# Patient Record
Sex: FEMALE | Race: WHITE | Employment: OTHER | URBAN - METROPOLITAN AREA
[De-identification: names, ages, dates, MRNs, and addresses within clinical notes are randomized per-mention and may not be internally consistent; named-entity substitution may affect disease eponyms.]

---

## 2021-01-19 ENCOUNTER — APPOINTMENT (OUTPATIENT)
Dept: CT IMAGING | Age: 81
DRG: 552 | End: 2021-01-19
Payer: MEDICARE

## 2021-01-19 ENCOUNTER — HOSPITAL ENCOUNTER (INPATIENT)
Age: 81
LOS: 2 days | Discharge: NURSING, REHAB OR LONG-TERM CARE | DRG: 552 | End: 2021-01-21
Attending: EMERGENCY MEDICINE | Admitting: SURGERY
Payer: MEDICARE

## 2021-01-19 DIAGNOSIS — S12.9XXA CLOSED FRACTURE OF CERVICAL VERTEBRA, UNSPECIFIED CERVICAL VERTEBRAL LEVEL, INITIAL ENCOUNTER (HCC): Primary | ICD-10-CM

## 2021-01-19 PROBLEM — T14.90XA TRAUMA: Status: ACTIVE | Noted: 2021-01-19

## 2021-01-19 LAB
ALBUMIN SERPL-MCNC: 3.7 G/DL (ref 3.5–5.2)
ALP BLD-CCNC: 107 U/L (ref 35–104)
ALT SERPL-CCNC: 8 U/L (ref 0–32)
ANION GAP SERPL CALCULATED.3IONS-SCNC: 11 MMOL/L (ref 7–16)
ANION GAP SERPL CALCULATED.3IONS-SCNC: 8 MMOL/L (ref 7–16)
AST SERPL-CCNC: 17 U/L (ref 0–31)
BASOPHILS ABSOLUTE: 0 E9/L (ref 0–0.2)
BASOPHILS RELATIVE PERCENT: 0 % (ref 0–2)
BILIRUB SERPL-MCNC: 0.5 MG/DL (ref 0–1.2)
BUN BLDV-MCNC: 14 MG/DL (ref 8–23)
BUN BLDV-MCNC: 16 MG/DL (ref 8–23)
CALCIUM SERPL-MCNC: 9.6 MG/DL (ref 8.6–10.2)
CALCIUM SERPL-MCNC: 9.7 MG/DL (ref 8.6–10.2)
CHLORIDE BLD-SCNC: 105 MMOL/L (ref 98–107)
CHLORIDE BLD-SCNC: 105 MMOL/L (ref 98–107)
CO2: 25 MMOL/L (ref 22–29)
CO2: 28 MMOL/L (ref 22–29)
CREAT SERPL-MCNC: 0.9 MG/DL (ref 0.5–1)
CREAT SERPL-MCNC: 1 MG/DL (ref 0.5–1)
EKG ATRIAL RATE: 55 BPM
EKG P AXIS: 67 DEGREES
EKG P-R INTERVAL: 184 MS
EKG Q-T INTERVAL: 434 MS
EKG QRS DURATION: 84 MS
EKG QTC CALCULATION (BAZETT): 415 MS
EKG R AXIS: -11 DEGREES
EKG T AXIS: 27 DEGREES
EKG VENTRICULAR RATE: 55 BPM
EOSINOPHILS ABSOLUTE: 0.19 E9/L (ref 0.05–0.5)
EOSINOPHILS RELATIVE PERCENT: 2.9 % (ref 0–6)
GFR AFRICAN AMERICAN: >60
GFR NON-AFRICAN AMERICAN: 53 ML/MIN/1.73
GFR NON-AFRICAN AMERICAN: 60 ML/MIN/1.73
GFR NON-AFRICAN AMERICAN: >60 ML/MIN/1.73
GLUCOSE BLD-MCNC: 93 MG/DL (ref 74–99)
GLUCOSE BLD-MCNC: 96 MG/DL (ref 74–99)
GLUCOSE BLD-MCNC: 99 MG/DL (ref 74–99)
HCT VFR BLD CALC: 36 % (ref 34–48)
HCT VFR BLD CALC: 36.3 % (ref 34–48)
HEMOGLOBIN: 10.5 G/DL (ref 11.5–15.5)
HEMOGLOBIN: 11.1 G/DL (ref 11.5–15.5)
IMMATURE GRANULOCYTES #: 0.02 E9/L
IMMATURE GRANULOCYTES %: 0.3 % (ref 0–5)
LYMPHOCYTES ABSOLUTE: 1.11 E9/L (ref 1.5–4)
LYMPHOCYTES RELATIVE PERCENT: 16.8 % (ref 20–42)
MCH RBC QN AUTO: 26.8 PG (ref 26–35)
MCH RBC QN AUTO: 27.1 PG (ref 26–35)
MCHC RBC AUTO-ENTMCNC: 29.2 % (ref 32–34.5)
MCHC RBC AUTO-ENTMCNC: 30.6 % (ref 32–34.5)
MCV RBC AUTO: 88.8 FL (ref 80–99.9)
MCV RBC AUTO: 91.8 FL (ref 80–99.9)
MONOCYTES ABSOLUTE: 0.59 E9/L (ref 0.1–0.95)
MONOCYTES RELATIVE PERCENT: 8.9 % (ref 2–12)
NEUTROPHILS ABSOLUTE: 4.71 E9/L (ref 1.8–7.3)
NEUTROPHILS RELATIVE PERCENT: 71.1 % (ref 43–80)
PDW BLD-RTO: 16.1 FL (ref 11.5–15)
PDW BLD-RTO: 16.4 FL (ref 11.5–15)
PERFORMED ON: NORMAL
PLATELET # BLD: 203 E9/L (ref 130–450)
PLATELET # BLD: 218 E9/L (ref 130–450)
PMV BLD AUTO: 9.2 FL (ref 7–12)
PMV BLD AUTO: 9.7 FL (ref 7–12)
POC CHLORIDE: 107 MMOL/L (ref 100–108)
POC CREATININE: 0.8 MG/DL (ref 0.5–1)
POC POTASSIUM: 4 MMOL/L (ref 3.5–5)
POC SODIUM: 145 MMOL/L (ref 132–146)
POTASSIUM REFLEX MAGNESIUM: 4.9 MMOL/L (ref 3.5–5)
POTASSIUM SERPL-SCNC: 4.2 MMOL/L (ref 3.5–5)
RBC # BLD: 3.92 E12/L (ref 3.5–5.5)
RBC # BLD: 4.09 E12/L (ref 3.5–5.5)
SODIUM BLD-SCNC: 138 MMOL/L (ref 132–146)
SODIUM BLD-SCNC: 144 MMOL/L (ref 132–146)
TOTAL PROTEIN: 7.2 G/DL (ref 6.4–8.3)
TROPONIN: <0.01 NG/ML (ref 0–0.03)
WBC # BLD: 6.6 E9/L (ref 4.5–11.5)
WBC # BLD: 7.1 E9/L (ref 4.5–11.5)

## 2021-01-19 PROCEDURE — 84132 ASSAY OF SERUM POTASSIUM: CPT

## 2021-01-19 PROCEDURE — 93005 ELECTROCARDIOGRAM TRACING: CPT | Performed by: STUDENT IN AN ORGANIZED HEALTH CARE EDUCATION/TRAINING PROGRAM

## 2021-01-19 PROCEDURE — 2580000003 HC RX 258: Performed by: STUDENT IN AN ORGANIZED HEALTH CARE EDUCATION/TRAINING PROGRAM

## 2021-01-19 PROCEDURE — 36415 COLL VENOUS BLD VENIPUNCTURE: CPT

## 2021-01-19 PROCEDURE — 6370000000 HC RX 637 (ALT 250 FOR IP): Performed by: STUDENT IN AN ORGANIZED HEALTH CARE EDUCATION/TRAINING PROGRAM

## 2021-01-19 PROCEDURE — 6360000004 HC RX CONTRAST MEDICATION: Performed by: RADIOLOGY

## 2021-01-19 PROCEDURE — 99285 EMERGENCY DEPT VISIT HI MDM: CPT

## 2021-01-19 PROCEDURE — 99223 1ST HOSP IP/OBS HIGH 75: CPT | Performed by: SURGERY

## 2021-01-19 PROCEDURE — 82947 ASSAY GLUCOSE BLOOD QUANT: CPT

## 2021-01-19 PROCEDURE — 85027 COMPLETE CBC AUTOMATED: CPT

## 2021-01-19 PROCEDURE — 1200000000 HC SEMI PRIVATE

## 2021-01-19 PROCEDURE — 80048 BASIC METABOLIC PNL TOTAL CA: CPT

## 2021-01-19 PROCEDURE — 85025 COMPLETE CBC W/AUTO DIFF WBC: CPT

## 2021-01-19 PROCEDURE — 82565 ASSAY OF CREATININE: CPT

## 2021-01-19 PROCEDURE — 84484 ASSAY OF TROPONIN QUANT: CPT

## 2021-01-19 PROCEDURE — 84295 ASSAY OF SERUM SODIUM: CPT

## 2021-01-19 PROCEDURE — 82435 ASSAY OF BLOOD CHLORIDE: CPT

## 2021-01-19 PROCEDURE — 80053 COMPREHEN METABOLIC PANEL: CPT

## 2021-01-19 PROCEDURE — 70498 CT ANGIOGRAPHY NECK: CPT

## 2021-01-19 PROCEDURE — 6360000002 HC RX W HCPCS: Performed by: STUDENT IN AN ORGANIZED HEALTH CARE EDUCATION/TRAINING PROGRAM

## 2021-01-19 PROCEDURE — 93010 ELECTROCARDIOGRAM REPORT: CPT | Performed by: INTERNAL MEDICINE

## 2021-01-19 RX ORDER — FOLIC ACID 1 MG/1
1 TABLET ORAL DAILY
Status: DISCONTINUED | OUTPATIENT
Start: 2021-01-19 | End: 2021-01-21 | Stop reason: HOSPADM

## 2021-01-19 RX ORDER — METHOCARBAMOL 500 MG/1
1000 TABLET, FILM COATED ORAL 4 TIMES DAILY
Status: DISCONTINUED | OUTPATIENT
Start: 2021-01-19 | End: 2021-01-19

## 2021-01-19 RX ORDER — GABAPENTIN 100 MG/1
100 CAPSULE ORAL 3 TIMES DAILY
Status: DISCONTINUED | OUTPATIENT
Start: 2021-01-19 | End: 2021-01-21 | Stop reason: HOSPADM

## 2021-01-19 RX ORDER — ZOLPIDEM TARTRATE 10 MG/1
TABLET ORAL NIGHTLY PRN
Status: ON HOLD | COMMUNITY
End: 2021-01-21 | Stop reason: HOSPADM

## 2021-01-19 RX ORDER — ATORVASTATIN CALCIUM 40 MG/1
40 TABLET, FILM COATED ORAL NIGHTLY
Status: DISCONTINUED | OUTPATIENT
Start: 2021-01-19 | End: 2021-01-21 | Stop reason: HOSPADM

## 2021-01-19 RX ORDER — SODIUM CHLORIDE 9 MG/ML
INJECTION, SOLUTION INTRAVENOUS CONTINUOUS
Status: DISCONTINUED | OUTPATIENT
Start: 2021-01-19 | End: 2021-01-19

## 2021-01-19 RX ORDER — ACETAMINOPHEN 325 MG/1
650 TABLET ORAL
Status: DISCONTINUED | OUTPATIENT
Start: 2021-01-19 | End: 2021-01-19

## 2021-01-19 RX ORDER — LANSOPRAZOLE
15 KIT
Status: DISCONTINUED | OUTPATIENT
Start: 2021-01-19 | End: 2021-01-21 | Stop reason: HOSPADM

## 2021-01-19 RX ORDER — MEMANTINE HYDROCHLORIDE 10 MG/1
10 TABLET ORAL 2 TIMES DAILY
COMMUNITY

## 2021-01-19 RX ORDER — MEMANTINE HYDROCHLORIDE 10 MG/1
10 TABLET ORAL DAILY
Status: DISCONTINUED | OUTPATIENT
Start: 2021-01-19 | End: 2021-01-21 | Stop reason: HOSPADM

## 2021-01-19 RX ORDER — OLANZAPINE 5 MG/1
2.5 TABLET ORAL NIGHTLY
Status: DISCONTINUED | OUTPATIENT
Start: 2021-01-19 | End: 2021-01-21 | Stop reason: HOSPADM

## 2021-01-19 RX ORDER — ONDANSETRON 2 MG/ML
4 INJECTION INTRAMUSCULAR; INTRAVENOUS EVERY 6 HOURS PRN
Status: DISCONTINUED | OUTPATIENT
Start: 2021-01-19 | End: 2021-01-21 | Stop reason: HOSPADM

## 2021-01-19 RX ORDER — ATORVASTATIN CALCIUM 40 MG/1
40 TABLET, FILM COATED ORAL NIGHTLY
COMMUNITY

## 2021-01-19 RX ORDER — RIZATRIPTAN BENZOATE 10 MG/1
10 TABLET ORAL
Status: ON HOLD | COMMUNITY
End: 2021-01-21 | Stop reason: HOSPADM

## 2021-01-19 RX ORDER — FOLIC ACID 1 MG/1
1 TABLET ORAL DAILY
COMMUNITY

## 2021-01-19 RX ORDER — PROMETHAZINE HYDROCHLORIDE 25 MG/1
12.5 TABLET ORAL EVERY 6 HOURS PRN
Status: DISCONTINUED | OUTPATIENT
Start: 2021-01-19 | End: 2021-01-19

## 2021-01-19 RX ORDER — OXYBUTYNIN CHLORIDE 5 MG/1
5 TABLET ORAL 3 TIMES DAILY
COMMUNITY

## 2021-01-19 RX ORDER — OXYBUTYNIN CHLORIDE 5 MG/1
5 TABLET ORAL 3 TIMES DAILY
Status: DISCONTINUED | OUTPATIENT
Start: 2021-01-19 | End: 2021-01-21 | Stop reason: HOSPADM

## 2021-01-19 RX ORDER — FLUOXETINE HYDROCHLORIDE 20 MG/1
40 CAPSULE ORAL DAILY
COMMUNITY

## 2021-01-19 RX ORDER — HYDROCODONE BITARTRATE AND ACETAMINOPHEN 5; 325 MG/1; MG/1
1 TABLET ORAL EVERY 6 HOURS PRN
Status: ON HOLD | COMMUNITY
End: 2021-01-21 | Stop reason: HOSPADM

## 2021-01-19 RX ORDER — OLANZAPINE 5 MG/1
5 TABLET ORAL DAILY
Status: ON HOLD | COMMUNITY
End: 2021-01-21 | Stop reason: HOSPADM

## 2021-01-19 RX ORDER — CHOLECALCIFEROL (VITAMIN D3) 1250 MCG
CAPSULE ORAL
COMMUNITY

## 2021-01-19 RX ORDER — SODIUM CHLORIDE 0.9 % (FLUSH) 0.9 %
10 SYRINGE (ML) INJECTION EVERY 12 HOURS SCHEDULED
Status: DISCONTINUED | OUTPATIENT
Start: 2021-01-19 | End: 2021-01-21 | Stop reason: HOSPADM

## 2021-01-19 RX ORDER — SODIUM CHLORIDE 0.9 % (FLUSH) 0.9 %
10 SYRINGE (ML) INJECTION PRN
Status: DISCONTINUED | OUTPATIENT
Start: 2021-01-19 | End: 2021-01-21 | Stop reason: HOSPADM

## 2021-01-19 RX ORDER — PANTOPRAZOLE SODIUM 40 MG/1
40 TABLET, DELAYED RELEASE ORAL DAILY
COMMUNITY

## 2021-01-19 RX ORDER — FLUOXETINE HYDROCHLORIDE 20 MG/1
40 CAPSULE ORAL DAILY
Status: DISCONTINUED | OUTPATIENT
Start: 2021-01-19 | End: 2021-01-21 | Stop reason: HOSPADM

## 2021-01-19 RX ORDER — MECLIZINE HYDROCHLORIDE 25 MG/1
25 TABLET ORAL 3 TIMES DAILY PRN
COMMUNITY

## 2021-01-19 RX ORDER — HYDROCODONE BITARTRATE AND ACETAMINOPHEN 5; 325 MG/1; MG/1
1 TABLET ORAL EVERY 8 HOURS PRN
Status: DISCONTINUED | OUTPATIENT
Start: 2021-01-19 | End: 2021-01-21

## 2021-01-19 RX ORDER — ACETAMINOPHEN 325 MG/1
650 TABLET ORAL EVERY 6 HOURS PRN
Status: DISCONTINUED | OUTPATIENT
Start: 2021-01-19 | End: 2021-01-19

## 2021-01-19 RX ADMIN — HYDROCODONE BITARTRATE AND ACETAMINOPHEN 1 TABLET: 5; 325 TABLET ORAL at 18:08

## 2021-01-19 RX ADMIN — ATORVASTATIN CALCIUM 40 MG: 40 TABLET, FILM COATED ORAL at 21:26

## 2021-01-19 RX ADMIN — Medication 10 ML: at 21:38

## 2021-01-19 RX ADMIN — ONDANSETRON 4 MG: 2 INJECTION INTRAMUSCULAR; INTRAVENOUS at 18:07

## 2021-01-19 RX ADMIN — IOPAMIDOL 60 ML: 755 INJECTION, SOLUTION INTRAVENOUS at 05:31

## 2021-01-19 RX ADMIN — OXYBUTYNIN CHLORIDE 5 MG: 5 TABLET ORAL at 23:05

## 2021-01-19 RX ADMIN — GABAPENTIN 100 MG: 100 CAPSULE ORAL at 21:26

## 2021-01-19 RX ADMIN — SODIUM CHLORIDE: 9 INJECTION, SOLUTION INTRAVENOUS at 07:01

## 2021-01-19 RX ADMIN — SODIUM CHLORIDE, PRESERVATIVE FREE 10 ML: 5 INJECTION INTRAVENOUS at 21:27

## 2021-01-19 RX ADMIN — OLANZAPINE 2.5 MG: 5 TABLET, FILM COATED ORAL at 21:26

## 2021-01-19 RX ADMIN — ACETAMINOPHEN 650 MG: 325 TABLET ORAL at 06:50

## 2021-01-19 RX ADMIN — HYDROCODONE BITARTRATE AND ACETAMINOPHEN 1 TABLET: 5; 325 TABLET ORAL at 11:55

## 2021-01-19 SDOH — HEALTH STABILITY: MENTAL HEALTH: HOW OFTEN DO YOU HAVE A DRINK CONTAINING ALCOHOL?: NEVER

## 2021-01-19 ASSESSMENT — PAIN DESCRIPTION - PAIN TYPE
TYPE: ACUTE PAIN

## 2021-01-19 ASSESSMENT — PAIN DESCRIPTION - DESCRIPTORS
DESCRIPTORS: DISCOMFORT
DESCRIPTORS: ACHING;DISCOMFORT;DULL

## 2021-01-19 ASSESSMENT — PAIN DESCRIPTION - LOCATION
LOCATION: NECK

## 2021-01-19 ASSESSMENT — PAIN SCALES - GENERAL
PAINLEVEL_OUTOF10: 2
PAINLEVEL_OUTOF10: 6

## 2021-01-19 NOTE — ED NOTES
Bed: 21  Expected date:   Expected time:   Means of arrival:   Comments:  Ems-transfer     Renata Rick RN  01/19/21 9249

## 2021-01-19 NOTE — PROGRESS NOTES
Patient seen and examined  Awake and alert, collar in place neuro intact  Has anterior inferior C4 fracture (tear drop)  Will need collar for 8 weeks  ?  Syncope eval ?  samantha follow

## 2021-01-19 NOTE — H&P
TRAUMA SURGERY HISTORY & PHYSICAL  TRAUMA ATTENDING      Attending Physician Statement:  I have examined the patient in ED, reviewed the record, and discussed the case with the resident/APN. I agree with the  assessment and plan with the following corrections/additions. CC: s/p fall    HISTORY   The patient is a 78 y/o female who sustained a GLF earlier today. The patient reported  acute, constant  sharp pain localized to the cervical spine that started immediately. The intensity of the pain is 4/10. Pain does not radiate. There are no alleviating or worsening factors regarding the pain. The patient was transported by EMS to the [address] [facility] from scene. Evaluation prior to arrival included: H&P. Treatment prior to arrival included: CT head and c-spine. A trauma consult was requested to assist, guide,  and expedite further evaluation and treatment for the patient. Past medical/surgical/family/social history:  as noted in resident/APN note  The family history is noncontributory.     Review of Systems:  General ROS: negative  Psychological ROS: negative  ENT ROS: negative  Hematological and Lymphatic ROS: negative  Respiratory ROS: negative  Cardiovascular ROS: negative  Gastrointestinal ROS: negative  Genito-Urinary ROS: negative  Musculoskeletal ROS: cervical spine pain  Neurological ROS: negative     PHYSICAL EXAM:  Vitals:    01/19/21 1632   BP: (!) 176/70   Pulse: 57   Resp:    Temp:    SpO2:        Neuro:   GCS 14    Moving all extremities   Reactive, equal pupils  Psychiatric:  Affect normal, Judgement impaired d/t dementia   Alert and oriented to self, place  Head/Face:    soft tissue injury--forehead contusion  no bony deformities  Eyes:    Vision/EOM grossly intact  Ears:    no otorrhagia  Nose:     no epistaxis  Mouth:    no intraoral lacerations/ecchymoses  Neck:   semi rigid collar present  no tracheal deviation   no soft tissue injury  Chest: no deformities  non tender  Lungs:     equal and clear bilateral breath sounds  no use of accessory muscles  Heart:    normal sinus rhythm  warm, well perfused  strong femoral pulses bilaterally  Abdomen:    non distended  non tender  no soft tissue injury  Back:    no soft tissue injury  no deformities  nontender   Musculoskeletal:    Gait not inspected due to patient on trauma bed  RUE:  no soft tissue injury, swelling, or deformity  RLE:  no soft tissue injury, swelling, or deformity  LUE:  no soft tissue injury, swelling, or deformity  LLE:  no soft tissue injury, swelling, or deformity        DIAGNOSTIC/ LAB FINDINGS  I  Independently reviewed the available laboratory studies and diagnostic imaging.     ASSESSMENT:  1.  C4 fracture  2.  dementia    PLAN:  Admit to general floor  NSGY c/s  Clarify code status      MD ISHMAEL Malave [facility]  1/19/2021  5:07 PM

## 2021-01-19 NOTE — DISCHARGE SUMMARY
Physician Discharge Summary     Patient ID:  Bishnu Choudhury  [id number]  43 y.o.  1940    Admit date: 1/19/2021    Discharge date and time:1/21/2021    Admitting Physician:  King Bautista MD    Admission Diagnoses: Trauma [T14.90XA]    Discharge Diagnoses: Active Problems:    Trauma  Resolved Problems:    * No resolved hospital problems. *      Admission Condition: serious    Discharged Condition: stable    Indication for Admission: Active Problems:    Trauma  Resolved Problems:    * No resolved hospital problems. [facility] Course/Procedures/Operation/treatments:   1/18-Trauma consult. Injury occurred just prior to arrival to outside hospital.  Patient is an 57-year-old female who did not remember falling earlier today. She stated that she took a shower early got dressed and then does not remember anything until she woke up on the floor. She is a small little contusion on her forehead. Outside hospital showed a C4 inferior vertebral body fracture. Otherwise she is GCS of 14  1/19-No acute events overnight. Pt is doing well in an aspen collar now. GCS 14. Neuromuscular intact. Nothing new on tert   1/20- No acute events overnight. Awaiting custom fit collar. 1/21- doing well. precert pending. COVID test ordered per protocol. GCS 15 this morning. COVID test negative. Ready for discharge to [facility].              Consults:   IP CONSULT TO TRAUMA SURGERY  IP CONSULT TO NEUROSURGERY  IP CONSULT TO PALLIATIVE CARE  IP CONSULT TO CASE MANAGEMENT  INPATIENT CONSULT TO ORTHOTIST/PROSTHETIST    Significant Diagnostic Studies:   Cta Neck W Contrast    Result Date: 1/19/2021 1. No evidence of acute trauma involving the major arterial vessels of the neck. 2. C4 vertebral body anteroinferior mildly displaced oblique acute fracture. 3. Left internal carotid artery proximal medial wall atherosclerotic calcification causing approximately 30% stenosis. Finding is compatible with 16-49% stenosis per sonographic NASCET index criteria. 4. Congenitally hypoplastic left vertebral artery. 5. Incidental finding of multinodular goiter, as discussed above. Recommend clinical correlation. 6. 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 Discharge Exam:  BP (!) 141/71   Pulse 75   Temp 97.8 °F (36.6 °C) (Temporal)   Resp 16   Ht 5' 7\" (1.702 m)   Wt 152 lb (68.9 kg)   SpO2 93%   BMI 23.81 kg/m²   Physical Exam  Constitutional:       Appearance: Normal appearance. HENT:      Head: Normocephalic and atraumatic. Nose: Nose normal.      Mouth/Throat:      Mouth: Mucous membranes are moist.      Pharynx: Oropharynx is clear. Eyes:      Extraocular Movements: Extraocular movements intact. Pupils: Pupils are equal, round, and reactive to light. Neck:      Comments: In cervical collar  Cardiovascular:      Rate and Rhythm: Normal rate and regular rhythm. Pulses: Normal pulses. Heart sounds: Normal heart sounds. Pulmonary:      Effort: Pulmonary effort is normal.      Breath sounds: Normal breath sounds. Abdominal:      General: There is no distension. Palpations: Abdomen is soft. Tenderness: There is no abdominal tenderness. Musculoskeletal:      Right lower leg: No edema. Left lower leg: No edema. Skin:     General: Skin is warm and dry. Neurological:      General: No focal deficit present. Mental Status: She is alert.       Comments: GCS 14--dementia, baseline   Psychiatric:         Mood and Affect: Mood normal.         Behavior: Behavior normal. Thought Content: Thought content normal.         Judgment: Judgment normal.           Disposition: SNF    Patient Instructions:   SPECIAL CONSIDERATIONS FOR OUR PATIENTS OVER THE AGE OF 65Y    Getting around your home safely can be a challenge if you have injuries or health problems that make it easy for you to fall. Loose rugs and furniture in walkways are among the dangers for many older people who have problems walking or who have poor eyesight. People who have conditions such as arthritis, osteoporosis, or dementia also must be careful not to fall. You can make your home safer with a few simple measures. Follow-up care is a key part of your treatment and safety. Be sure to make and go to all appointments, and call your doctor or nurse call line if you are having problems. It's also a good idea to know your test results and keep a list of the medicines you take. How can you care for yourself at home? Taking care of yourself  ? You may get dizzy if you do not drink enough water. To prevent dehydration, drink plenty of fluids, enough so that your urine is light yellow or clear like water. Choose water and other caffeine-free clear liquids. If you have kidney, heart, or liver disease and have to limit fluids, talk with your doctor before you increase the amount of fluids you drink. ? Exercise regularly to improve your strength, muscle tone, and balance. Walk if you can. Swimming may be a good choice if you cannot walk easily. ? Have your vision and hearing checked each year or any time you notice a change. If you have trouble seeing and hearing, you might not be able to avoid objects and could lose your balance. ? Know the side effects of the medicines you take. Ask your doctor or pharmacist whether the medicines you take can affect your balance. Sleeping pills or sedatives can affect your balance. ? Limit the amount of alcohol you drink. Alcohol can impair your balance and other senses. ? Ask your doctor whether calluses or corns on your feet need to be removed. If you wear loose-fitting shoes because of calluses or corns, you can lose your balance and fall. ? Talk to your doctor if you have numbness in your feet. Preventing falls at home  ? Remove raised doorway thresholds, throw rugs, and clutter. Repair loose carpet or raised areas in the floor. ? Move furniture and electrical cords to keep them out of walking paths. ? Use non-skid floor wax, and wipe up spills right away, especially on ceramic tile floors. ? If you use a walker or cane, put rubber tips on it. If you use crutches, clean the bottoms of them regularly with an abrasive pad, such as steel wool. ? Keep your house well lit, especially stairways, porches, and outside walkways. Use night-lights in areas such as hallways and washrooms. Add extra light switches or use remote switches (such as switches that go on or off when you clap your hands) to make it easier to turn lights on if you have to get up during the night. ? Install sturdy handrails on stairways. ? Move items in your cabinets so that the things you use a lot are on the lower shelves (about waist level). ? Keep a cordless phone and a flashlight with new batteries by your bed. If possible, put a phone in each of the main rooms of your house, or carry a cell phone in case you fall and cannot reach a phone. Or, you can wear a device around your neck or wrist. You push a button that sends a signal for help. ? Wear low-heeled shoes that fit well and give your feet good support. Use footwear with non-skid soles. Check the heels and soles of your shoes for wear. Repair or replace worn heels or soles. ? Do not wear socks without shoes on wood floors. ? Walk on the grass when the sidewalks are slippery. If you live in an area that gets snow and ice in the winter, sprinkle salt on slippery steps and sidewalks.     Preventing falls in the bath ? Install grab bars and non-skid mats inside and outside your shower or tub and near the toilet and sinks. ? Use shower chairs and bath benches. ? Use a hand-held shower head that will allow you to sit while showering. ? Get into a tub or shower by putting the weaker leg in first. Get out of a tub or shower with your strong side first.  ? Repair loose toilet seats and consider installing a raised toilet seat to make getting on and off the toilet easier. ? Keep your washroom door unlocked while you are in the shower.     Follow-up:  Trauma Clinic: [phone] option 2  [facility]  ISH[place], [address] [address]       Follow up:   [facility]  [address]  [phone]    In 2 weeks  for cervical fracture    [address] [place] [id number]    As needed       Signed:  VINOD Mcdaniel CNP  1/21/2021  2:50 PM

## 2021-01-19 NOTE — ED NOTES
Pt ripped out IV on way upstairs, tried x3 times unsuccessful for a new line, IV team paged and meeting patient on [id number].       Miles Barney RN  01/19/21 1673

## 2021-01-19 NOTE — H&P
TRAUMA HISTORY & PHYSICAL  Surgical Resident/Advance Practice Nurse  1/19/2021  2:10 AM    PRIMARY SURVEY    CHIEF COMPLAINT:  Trauma consult. Injury occurred just prior to arrival to outside hospital.  Patient is an 57-year-old female who did not remember falling earlier today. She stated that she took a shower early got dressed and then does not remember anything until she woke up on the floor. She is a small little contusion on her forehead. Outside hospital showed a C4 inferior vertebral body fracture.   Otherwise she is GCS of 14    AIRWAY:   Airway Normal  EMS ETT Absent  Noisy respirations Absent  Retractions: Absent  Vomiting/bleeding: Absent      BREATHING:    Midaxillary breath sound left:  Normal  Midaxillary breath sound right:  Normal    Cough sound intensity:  fair   FiO2: Room air  SMI unreliable      CIRCULATION:   Femerol pulse intensity: Strong  Palpebral conjunctiva: white      Vitals:    01/19/21 0120   BP: (!) 168/64   Pulse: 58   Resp: 16   Temp: 97.1 °F (36.2 °C)   SpO2: 96%       Vitals:    01/19/21 0120   BP: (!) 168/64   Pulse: 58   Resp: 16   Temp: 97.1 °F (36.2 °C)   SpO2: 96%   Weight: 152 lb (68.9 kg)   Height: 5' 7\" (1.702 m)        FAST EXAM: None    Central Nervous System    GCS Initial 15 minutes   Eye  Motor  Verbal 4 - Opens eyes on own  6 - Follows simple motor commands  4 - Seems confused, disoriented 4 - Opens eyes on own  6 - Follows simple motor commands  4 - Seems confused, disoriented     Neuromuscular blockade: No  Pupil size:  Left 4 mm    Right 4 mm  Pupil reaction: Yes    Wiggles fingers: Left Yes Right Yes  Wiggles toes: Left Yes   Right Yes    Hand grasp:   Left  Present      Right  Present  Plantar flexion: Left  Present      Right   Present    Loss of consciousness:  No  History Obtained From:  Patient & EMS  Private Medical Doctor: Unknown patient presenting from assisted living    Pre-exisiting Medical History:  yes Conditions: Dementia, GERD, polyneuropathy, RA, macular degeneration    Medications: Unknown    Allergies: Celebrex    Social History:   Tobacco use:  none  Alcohol use:  none  Illicit drug use:  no history of illicit drug use    Past Surgical History: Unknown    Anticoagulant use:  No   Antiplatelet use:    No     NSAID use in last 72 hours: no  Taken PCN in past:  yes  Last food/drink: Yesterday evening  Last tetanus: Yesterday evening    Family History:   Not pertinent to presenting problem. Complaints:   Head:  Mild  Neck:   Moderate  Chest:   None  Back:   None  Abdomen:   None  Extremities:   None  Comments:       Review of systems:  All negative unless otherwise noted. SECONDARY SURVEY  Head/scalp: Small contusion on the right superior aspect of the forehead. Mild overlying ecchymosis    Face: Atraumatic    Eyes/ears/nose: Atraumatic    Pharynx/mouth: Atraumatic    Neck: Atraumatic     Cervical spine tenderness:   Cervical collar in place at time of arrival  Pain:  moderate  ROM:  Not indicated     Chest wall:  Atraumatic    Heart:  Regular rate & rhythm    Abdomen: Atraumatic. Soft ND  Tenderness:  none    Pelvis: Atraumatic  Tenderness: none    Thoracolumbar spine: Atraumatic  Tenderness:  none    Genitourinary:  Atraumatic. No blood or urine noted    Rectum: Atraumatic. No blood noted. Perineum: Atraumatic. No blood or urine noted. Extremities:   Sensory normal  Motor normal    Distal Pulses  Left arm normal  Right arm normal  Left leg normal  Right leg normal    Capillary refill  Left arm normal  Right arm normal  Left leg normal  Right leg normal    Procedures in ED:  None    In the event of Emergency Blood Transfusion:  Due to the critical condition of this patient, I request the immediate release of blood products for emergency transfusion secondary to shock. I understand the increased risks incurred by the lack of complete transfusion testing.

## 2021-01-19 NOTE — ED NOTES
ramses [email or web address] orders/ticket to ride/ facesheet/consult      [facility]  01/19/21 1200

## 2021-01-19 NOTE — ED PROVIDER NOTES
HPI:  1/19/21, Time: 1:21 AM EVON Celis is a 79 y.o. female presenting to the ED for hx of fall with findings of cervical spine fracture, beginning 1 day ago. The complaint has been persistent, moderate in severity, and worsened by movement of neck. Sent here from outlying facility because of cervical spine fracture. Patient reporting no numbness or tingling. Patient reporting no chest pain. Patient reporting no abdominal pain or vomiting there is no lower extremity pain or hip pain. Patient is not on anticoagulants. Patient is unsure as to how she had fallen but per report patient was in a shower and fell in the shower. ROS:   Pertinent positives and negatives are stated within HPI, all other systems reviewed and are negative.  --------------------------------------------- PAST HISTORY ---------------------------------------------  Past Medical History:  has no past medical history on file. Past Surgical History:  has no past surgical history on file. Social History:  reports that she has never smoked. She has never used smokeless tobacco. She reports previous alcohol use. She reports that she does not use drugs. Family History: family history is not on file. The patients home medications have been reviewed. Allergies: Celecoxib    ---------------------------------------------------PHYSICAL EXAM--------------------------------------    Constitutional/General: Alert and oriented x3,   Head: Normocephalic contusion forehead  Eyes: PERRL, EOMI  Mouth: Oropharynx clear, handling secretions, no trismus  Neck: c collar in place  Pulmonary: Lungs clear to auscultation bilaterally, no wheezes, rales, or rhonchi. Not in respiratory distress  Cardiovascular:  Regular rate. Regular rhythm. No murmurs, gallops, or rubs.  2+ distal pulses  Chest: no chest wall tenderness Abdomen: Soft. Non tender. Non distended. +BS. No rebound, guarding, or rigidity. No pulsatile masses appreciated. Musculoskeletal: Moves all extremities x 4. Warm and well perfused, no clubbing, cyanosis, or edema. Capillary refill <3 seconds  Skin: warm and dry. No rashes. Neurologic: GCS 15, CN 2-12 grossly intact, no focal deficits, symmetric strength 5/5 in the upper and lower extremities bilaterally  Psych: Normal Affect    -------------------------------------------------- RESULTS -------------------------------------------------  I have personally reviewed all laboratory and imaging results for this patient. Results are listed below. LABS:  No results found for this visit on 01/19/21. RADIOLOGY:  Interpreted by Radiologist.  No orders to display               ------------------------- NURSING NOTES AND VITALS REVIEWED ---------------------------   The nursing notes within the ED encounter and vital signs as below have been reviewed by myself. BP (!) 168/64   Pulse 58   Temp 97.1 °F (36.2 °C)   Resp 16   Ht 5' 7\" (1.702 m)   Wt 152 lb (68.9 kg)   SpO2 96%   BMI 23.81 kg/m²   Oxygen Saturation Interpretation: Normal    The patients available past medical records and past encounters were reviewed. ------------------------------ ED COURSE/MEDICAL DECISION MAKING----------------------  Medications - No data to display          Medical Decision Making:      CTs were noted and reviewed. CT shows cervical spine fracture. Trauma service was consulted and plan will be for trauma service evaluate and admit. Re-Evaluations:             Re-evaluation. Patients symptoms show no change      Consultations:             trauma    Critical Care: This patient's ED course included: a personal history and physicial eaxmination    This patient has been closely monitored during their ED course.     Counseling:

## 2021-01-19 NOTE — PROGRESS NOTES
TRAUMA TERTIARY    Admit Date: 1/19/2021    [facility]    CC:    Chief Complaint   Patient presents with   [facility] Consultation     trauma consult from [facility]. fall was at assisted living, unwitnessed. pt amnesic to event. has c4 fx, arrived with c-collar in place       Alcohol pre-screening:  How many times in the past year have you had 4-5 or more drinks in a day?  none    Subjective:       No acute events overnight. Pt is doing well in an aspen collar now. GCS 14. Neuromuscular intact. Nothing new on tert       Objective:     Patient Vitals for the past 8 hrs:   BP Temp Pulse Resp SpO2 Height Weight   01/19/21 0523 (!) 186/78  60 16 98 %     01/19/21 0120 (!) 168/64 97.1 °F (36.2 °C) 58 16 96 % 5' 7\" (1.702 m) 152 lb (68.9 kg)       No intake/output data recorded. No intake/output data recorded. Radiology:  CTA NECK W CONTRAST   Final Result   1. No evidence of acute trauma involving the major arterial vessels of the   neck. 2. C4 vertebral body anteroinferior mildly displaced oblique acute fracture. 3. Left internal carotid artery proximal medial wall atherosclerotic   calcification causing approximately 30% stenosis. Finding is compatible with   16-49% stenosis per sonographic NASCET index criteria. 4. Congenitally hypoplastic left vertebral artery. 5. Incidental finding of multinodular goiter, as discussed above. Recommend   clinical correlation. 6. Incidental finding of upper lung bilateral calcified pleural plaques   suggesting asbestos related pleural disease. PHYSICAL EXAM:   GCS:  4 - Opens eyes on own   6 - Follows simple motor commands  4- Mild Confusion     Pupil size: Left 4 mm     Right 4 mm  Pupil reaction: Yes  Wiggles fingers: Left Yes     Right Yes  Wiggles toes: Left Yes     Right Yes  Plantar flexion: Left normal     Right normal    GENERAL:  NAD. A&Ox3. HEAD:  Small head contusion with small amount of ecchymosis   LUNGS:  No increased work of breathing. CTAB. CARDIOVASCULAR: RR  ABDOMEN:  Soft, non-distended, non-tender. No guarding, rigidity, rebound. EXTREMITIES:  MAEx4. No LE edema. Atraumatic. SKIN:  Warm and dry      Spine:       Spine Tenderness ROM   Cervical 5 /10 In aspen    Thoracic 0 /10 Normal   Lumbar 0 /10 Normal     Musculoskeletal:    Joint Tenderness Swelling/Deformity ROM   Right shoulder absent absent normal   Left shoulder absent absent normal   Right elbow absent absent normal   Left elbow absent absent normal   Right wrist absent absent normal   Left wrist absent absent normal   Right hand grasp absent absent normal   Left hand grasp absent absent normal   Right hip absent absent normal   Left hip absent absent normal   Right knee absent absent normal   Left knee absent absent normal   Right ankle absent absent normal   Left ankle absent absent normal   Right foot absent absent normal   Left foot absent absent normal         CONSULTS: neurosurgery       Active Problems:    Trauma  Resolved Problems:    * No resolved hospital problems. *        Assessment/Plan:     Neuro:  No acute brain bleed, small C4 body fx. GCS 14. Pain control. CTA- nml  Neurosurgery following   Dementia at baseline   [place] collar   CV: No acute issues. Pulm: No acute issues. Encourage IS/SMI. GI: No acute issues. Bowel regimen. Zofran PRN. Renal: No acute issues. Endocrine: No acute issues. MSK: No acute issues. PT/OT. Heme: No acute issues. On no blood thinners   ID: No acute issues.     Pain/Analgesia: Robaxin, tylenol   Bowel Regimen:  milk of mag  DVT PPx:  SCDs,  GI PPx:  General diet      Code status:  DNR-CC    Disposition:  Medical floor     Rahat Vázquez DO  Resident, PGY-1  1/19/2021  6:21 AM

## 2021-01-20 LAB
ANION GAP SERPL CALCULATED.3IONS-SCNC: 6 MMOL/L (ref 7–16)
BUN BLDV-MCNC: 14 MG/DL (ref 8–23)
CALCIUM SERPL-MCNC: 9.2 MG/DL (ref 8.6–10.2)
CHLORIDE BLD-SCNC: 101 MMOL/L (ref 98–107)
CO2: 31 MMOL/L (ref 22–29)
CREAT SERPL-MCNC: 1.1 MG/DL (ref 0.5–1)
GFR AFRICAN AMERICAN: 58
GFR NON-AFRICAN AMERICAN: 48 ML/MIN/1.73
GLUCOSE BLD-MCNC: 107 MG/DL (ref 74–99)
HCT VFR BLD CALC: 36.7 % (ref 34–48)
HEMOGLOBIN: 11.1 G/DL (ref 11.5–15.5)
MCH RBC QN AUTO: 27.1 PG (ref 26–35)
MCHC RBC AUTO-ENTMCNC: 30.2 % (ref 32–34.5)
MCV RBC AUTO: 89.5 FL (ref 80–99.9)
PDW BLD-RTO: 16.2 FL (ref 11.5–15)
PLATELET # BLD: 224 E9/L (ref 130–450)
PMV BLD AUTO: 9.4 FL (ref 7–12)
POTASSIUM REFLEX MAGNESIUM: 4.4 MMOL/L (ref 3.5–5)
RBC # BLD: 4.1 E12/L (ref 3.5–5.5)
SODIUM BLD-SCNC: 138 MMOL/L (ref 132–146)
WBC # BLD: 6.7 E9/L (ref 4.5–11.5)

## 2021-01-20 PROCEDURE — 99221 1ST HOSP IP/OBS SF/LOW 40: CPT | Performed by: NURSE PRACTITIONER

## 2021-01-20 PROCEDURE — 97166 OT EVAL MOD COMPLEX 45 MIN: CPT

## 2021-01-20 PROCEDURE — 99232 SBSQ HOSP IP/OBS MODERATE 35: CPT | Performed by: SURGERY

## 2021-01-20 PROCEDURE — 6360000002 HC RX W HCPCS: Performed by: SURGERY

## 2021-01-20 PROCEDURE — L0172 CERV COL SR FOAM 2PC PRE OTS: HCPCS

## 2021-01-20 PROCEDURE — 36415 COLL VENOUS BLD VENIPUNCTURE: CPT

## 2021-01-20 PROCEDURE — 85027 COMPLETE CBC AUTOMATED: CPT

## 2021-01-20 PROCEDURE — 97530 THERAPEUTIC ACTIVITIES: CPT

## 2021-01-20 PROCEDURE — 97162 PT EVAL MOD COMPLEX 30 MIN: CPT

## 2021-01-20 PROCEDURE — U0003 INFECTIOUS AGENT DETECTION BY NUCLEIC ACID (DNA OR RNA); SEVERE ACUTE RESPIRATORY SYNDROME CORONAVIRUS 2 (SARS-COV-2) (CORONAVIRUS DISEASE [COVID-19]), AMPLIFIED PROBE TECHNIQUE, MAKING USE OF HIGH THROUGHPUT TECHNOLOGIES AS DESCRIBED BY CMS-2020-01-R: HCPCS

## 2021-01-20 PROCEDURE — 80048 BASIC METABOLIC PNL TOTAL CA: CPT

## 2021-01-20 PROCEDURE — 1200000000 HC SEMI PRIVATE

## 2021-01-20 PROCEDURE — 6370000000 HC RX 637 (ALT 250 FOR IP): Performed by: STUDENT IN AN ORGANIZED HEALTH CARE EDUCATION/TRAINING PROGRAM

## 2021-01-20 PROCEDURE — 2580000003 HC RX 258: Performed by: STUDENT IN AN ORGANIZED HEALTH CARE EDUCATION/TRAINING PROGRAM

## 2021-01-20 RX ORDER — ACETAMINOPHEN 325 MG/1
650 TABLET ORAL EVERY 4 HOURS PRN
Status: DISCONTINUED | OUTPATIENT
Start: 2021-01-20 | End: 2021-01-21 | Stop reason: HOSPADM

## 2021-01-20 RX ORDER — HEPARIN SODIUM 10000 [USP'U]/ML
5000 INJECTION, SOLUTION INTRAVENOUS; SUBCUTANEOUS EVERY 8 HOURS SCHEDULED
Status: DISCONTINUED | OUTPATIENT
Start: 2021-01-20 | End: 2021-01-21 | Stop reason: HOSPADM

## 2021-01-20 RX ADMIN — Medication 10 ML: at 08:25

## 2021-01-20 RX ADMIN — GABAPENTIN 100 MG: 100 CAPSULE ORAL at 13:09

## 2021-01-20 RX ADMIN — MEMANTINE HYDROCHLORIDE 10 MG: 10 TABLET, FILM COATED ORAL at 08:22

## 2021-01-20 RX ADMIN — FOLIC ACID 1 MG: 1 TABLET ORAL at 08:25

## 2021-01-20 RX ADMIN — HEPARIN SODIUM 5000 UNITS: 10000 INJECTION INTRAVENOUS; SUBCUTANEOUS at 21:43

## 2021-01-20 RX ADMIN — HYDROCODONE BITARTRATE AND ACETAMINOPHEN 1 TABLET: 5; 325 TABLET ORAL at 21:43

## 2021-01-20 RX ADMIN — GABAPENTIN 100 MG: 100 CAPSULE ORAL at 08:25

## 2021-01-20 RX ADMIN — OXYBUTYNIN CHLORIDE 5 MG: 5 TABLET ORAL at 08:22

## 2021-01-20 RX ADMIN — HYDROCODONE BITARTRATE AND ACETAMINOPHEN 1 TABLET: 5; 325 TABLET ORAL at 08:49

## 2021-01-20 RX ADMIN — ATORVASTATIN CALCIUM 40 MG: 40 TABLET, FILM COATED ORAL at 21:42

## 2021-01-20 RX ADMIN — OXYBUTYNIN CHLORIDE 5 MG: 5 TABLET ORAL at 13:09

## 2021-01-20 RX ADMIN — HYDROCODONE BITARTRATE AND ACETAMINOPHEN 1 TABLET: 5; 325 TABLET ORAL at 01:47

## 2021-01-20 RX ADMIN — OLANZAPINE 2.5 MG: 5 TABLET, FILM COATED ORAL at 21:43

## 2021-01-20 RX ADMIN — Medication 10 ML: at 21:43

## 2021-01-20 RX ADMIN — GABAPENTIN 100 MG: 100 CAPSULE ORAL at 21:42

## 2021-01-20 RX ADMIN — FLUOXETINE HYDROCHLORIDE 40 MG: 20 CAPSULE ORAL at 08:21

## 2021-01-20 ASSESSMENT — PAIN SCALES - GENERAL
PAINLEVEL_OUTOF10: 2
PAINLEVEL_OUTOF10: 7
PAINLEVEL_OUTOF10: 7

## 2021-01-20 ASSESSMENT — ENCOUNTER SYMPTOMS
EYES NEGATIVE: 1
RESPIRATORY NEGATIVE: 1
ALLERGIC/IMMUNOLOGIC NEGATIVE: 1
GASTROINTESTINAL NEGATIVE: 1

## 2021-01-20 ASSESSMENT — PAIN DESCRIPTION - DESCRIPTORS
DESCRIPTORS: ACHING;DISCOMFORT
DESCRIPTORS: ACHING;DISCOMFORT;SHARP
DESCRIPTORS: ACHING;DISCOMFORT;CRAMPING

## 2021-01-20 ASSESSMENT — PAIN DESCRIPTION - PAIN TYPE
TYPE: ACUTE PAIN

## 2021-01-20 NOTE — PROGRESS NOTES
Neurosurg progress note  VITALS:  BP (!) 150/67   Pulse 64   Temp 97.3 °F (36.3 °C) (Temporal)   Resp 16   Ht 5' 7\" (1.702 m)   Wt 152 lb (68.9 kg)   SpO2 92%   BMI 23.81 kg/m²   24HR INTAKE/OUTPUT:    Intake/Output Summary (Last 24 hours) at 1/20/2021 0819  Last data filed at 1/20/2021 4255  Gross per 24 hour   Intake 120 ml   Output 1800 ml   Net -1680 ml     Cta Neck W Contrast    Result Date: 1/19/2021 EXAMINATION: CTA OF THE NECK 1/19/2021 4:49 am TECHNIQUE: CTA of the neck was performed with the administration of intravenous contrast. Multiplanar reformatted images are provided for review. MIP images are provided for review. Stenosis of the internal carotid arteries measured using NASCET criteria. Dose modulation, iterative reconstruction, and/or weight based adjustment of the mA/kV was utilized to reduce the radiation dose to as low as reasonably achievable. COMPARISON: None. HISTORY: ORDERING SYSTEM PROVIDED HISTORY: trauma TECHNOLOGIST PROVIDED HISTORY: Reason for exam:->trauma Has a \"code stroke\" or \"stroke alert\" been called? ->No FINDINGS: AORTIC ARCH/ARCH VESSELS: No dissection or arterial injury. No significant stenosis of the brachiocephalic or subclavian arteries. CAROTID ARTERIES: Medial wall atherosclerotic calcification causes approximately 30% narrowing of the proximal left internal carotid artery. No dissection, arterial injury, or hemodynamically significant stenosis by NASCET criteria. 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 No dissection, arterial injury, or significant stenosis. SOFT TISSUES: The lung apices are clear. No cervical or superior mediastinal lymphadenopathy. The larynx and pharynx are unremarkable. Enlargement of the left greater than right thyroid lobes is noted with multifocal numerous hypodense nodular densities, with the largest on the left measuring up to 27 mm in diameter. Phoebe Nutley BONES: C4 vertebral body anteroinferior mildly displaced oblique acute fracture is present. Partial visualization of bilateral lung pleural calcified plaques are seen. 1. No evidence of acute trauma involving the major arterial vessels of the neck. 2. C4 vertebral body anteroinferior mildly displaced oblique acute fracture. 3. Left internal carotid artery proximal medial wall atherosclerotic calcification causing approximately 30% stenosis. Finding is compatible with 16-49% stenosis per sonographic NASCET index criteria. 4. Congenitally hypoplastic left vertebral artery. 5. Incidental finding of multinodular goiter, as discussed above. Recommend clinical correlation. 6. 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     CBC:   Lab Results   Component Value Date    WBC 6.7 01/20/2021    RBC 4.10 01/20/2021    HGB 11.1 01/20/2021    HCT 36.7 01/20/2021    MCV 89.5 01/20/2021    MCH 27.1 01/20/2021    MCHC 30.2 01/20/2021    RDW 16.2 01/20/2021     01/20/2021    MPV 9.4 01/20/2021     BMP:    Lab Results   Component Value Date     01/20/2021    K 4.4 01/20/2021     01/20/2021    CO2 31 01/20/2021    BUN 14 01/20/2021    LABALBU 3.7 01/19/2021    LABALBU 4.3 07/29/2011    CREATININE 1.1 01/20/2021    CALCIUM 9.2 01/20/2021    GFRAA 58 01/20/2021    LABGLOM 48 01/20/2021    GLUCOSE 107 01/20/2021    GLUCOSE 87 07/29/2011      sodium chloride flush  10 mL Intravenous 2 times per day    FLUoxetine  40 mg Oral Daily    atorvastatin  40 mg Oral Nightly    folic acid  1 mg Oral Daily    gabapentin  100 mg Oral TID    memantine  10 mg Oral Daily    OLANZapine  2.5 mg Oral Nightly    lansoprazole  15 mg Oral QAM AC    oxybutynin  5 mg Oral TID     Collar in place follows commands perrl eomi   Assessment:  Patient Active Problem List   Diagnosis    Trauma     Plan:Hopefully to rehab at [facility] called and spoke with daughter PT/OT assess Continue current care  Princess Sterling M.D.

## 2021-01-20 NOTE — CONSULTS
Palliative Care Department  [phone]  Palliative Care Initial Consult  Provider Wilson VINOD-CNP, AGACNP-BC    Kenzie Izaguirre  [id number]  Hospital Day: 2  Date of Initial Consult: 1/19/2021  Referring Provider: Martina Valentin MD  Palliative Medicine was consulted for assistance with: Goals of Care    HPI:   Kenzie Izaguirre is a 79 y.o. with a past medical history of HLD, migraine, dementia, depression who was admitted on 1/19/2021 from [facility] with a CHIEF COMPLAINT of fall in shower. ASSESSMENT/PLAN:     Pertinent Hospital Diagnoses     ? FallGCS 14 upon arrival, Hx dementia, therapy evaluation      Palliative Care Encounter / Counseling Regarding Goals of Care  Please see detailed goals of care discussion as below  ? At this time, Kenzie Izaguirre, Does not have capacity for medical decision-making. Capacity is time limited and situation/question specific  ? During encounter Madonna Chiu was surrogate medical decision-maker  ? Outcome of goals of care meeting:  ? Code status DNR-CCA  ? Advanced Directives: no POA or living will in [facility]  ?  Surrogate/Legal NOK:    Randy Osuna [phone] (only grown child local)     Spiritual assessment: no spiritual distress identified  Bereavement and grief: to be determined  Referrals to: none today  SUBJECTIVE:     Current medical issues leading to Palliative Medicine involvement include   Active Hospital Problems    Diagnosis Date Noted    Trauma Brynn Amado 01/19/2021 Details of Conversation: Patient thinks she possibly fell when she was in the shower. Had to take shower at an odd time d/t no hot water at her regular shower time. Patient got up and got dressed. Patient was transported to our facility as a trauma from the scene. Found to have a C4 (tear drop) fracture/c-collar in place, dementia. We were consulted for goals of care. Patient is already a DNR-CCA. Patient will need the collar for 8 weeks. Patient is from [place] [address], physical therapy initial assessment completed. Per SW: They are awaiting a return call to see if [facility] can accept. Spoke to patient about her  who passed away in 2017. Patient is from [place]. Patient's daughter is from [place]. Patient has another daughter from [place], and a son from [place]. GOAL-get some rehab at [facility], and return to [facility] assisted living in [place]. No further needs from Palliative Care at this time. Palliative care will sign off the case. Daughter Inocencio Lebron thanked me for my time, and for explaining the CODE STATUS DNR-CCA to her.       OBJECTIVE:   Prognosis: depends upon goals    Physical Exam:  BP (!) 150/67   Pulse 64   Temp 97.3 °F (36.3 °C) (Temporal)   Resp 16   Ht 5' 7\" (1.702 m)   Wt 152 lb (68.9 kg)   SpO2 92%   BMI 23.81 kg/m²   Gen:  Elderly, thin, NAD, awake, alert  HEENT:  Normocephalic, atraumatic, mucosa moist, EOMI  Neck:  C-Collar in place Supple, trachea midline, no JVD  Lungs:  CTA bilaterally, no audible rhonchi or wheezes noted, respirations unlabored  Heart[de-identified]  RRR, distant heart tones, no murmur, rub, or gallop noted during exam  Abd:  Soft, non tender, non distended, bowel sounds present  : Catheter  Ext:  Moving all extremities, no edema, pulses present  Skin:  Warm and dry  Neuro:  PERRL, Alert, oriented x 3; following commands, intermittent confusion    Objective data reviewed: labs, images, records, medication use, vitals and chart Discussed patient and the plan of care with the other IDT members: Palliative Medicine IDT Team, Floor Nurse, Patient and Family    Time/Communication  Greater than 50% of time spent, total 30 minutes in counseling and coordination of care at the bedside regarding goals of care. Thank you for allowing Palliative Medicine to participate in the care of Jermaine Waterman. Note: This report was completed using computerTouchbase voiced recognition software. Every effort has been made to ensure accuracy; however, inadvertent computerized transcription errors may be present.

## 2021-01-20 NOTE — CARE COORDINATION
1/20/2021 social work transition of care planning  Sw spoke with pt's dtr-Judy,due to pt has dementia. Pt is from [address] [facility] [address]. Pt has raised toilet seat and w/w. Pt pcp is Dr. Dk Rebolledo and pharmacy is [facility](facility pharmacy). Explained sw role in transition of care planning. Per Anitha Cornell, she would prefer pt return to [facility] with [facility]-(can accept),awaiting SOV return call. Sw will follow. Electronically signed by MAURILIO Desir on 1/20/2021 at 12:54 PM     Addendum: Sw followed up with SOv liaison, per AL pt must be able to toilet herself to return to the AL. [facility] has accepted and requires covid result. Wet with BD MAX card given to charge Nurse. Per [place] and Pt's dtr, pt has Medicare as primary then [facility] as secondary. Per Anitha Cornell pt has had both covid vaccine shots. Sw will follow. Envelope will be in soft chart.   Electronically signed by MAURILIO Desir on 1/20/2021 at 2:15 PM

## 2021-01-20 NOTE — PROGRESS NOTES
Dr Natalia Hirsch notified of admission    Electronically signed by VINOD Dias CNP on 1/20/2021 at 8:52 AM

## 2021-01-20 NOTE — PROGRESS NOTES
OCCUPATIONAL THERAPY INITIAL EVALUATION      Date:2021  Patient Name: Osmel Guardado  MRN: [id number]  : 1940  Room: [address] [place] [address]    Evaluating OT: Tia . Heath, OTR/L #[id number]    Referring physician: Koby Macias DO  AM-PAC Daily Activity Raw Score: 15/24  Recommended Adaptive Equipment: ww, shower seat, BSC, AE for LE dressing and bathing prn     Diagnosis: TRAUMA C4 fracture with possible syncope      Precautions:  Falls, C collar, dementia, spinal precautions     Home Living: Pt lives at [place] [address] setup: walk in shower with seat   Equipment owned: ww  Prior Level of Function:   States mod I  with ADLs ,  assisted with IADLs; using ww for ambulation.    Driving: no                           Medication Management setup  Leisure:AL activity    Pain Level: LBP, head ache - R side  Cognition: A&O: 3/3; Follows 1-2 step directions- unable to state situation   Memory:  Fair-    Sequencing:  Fair-   Problem solving:  Fair- with noted confusion intermittent   Judgement/safety:  Fair-     Functional Assessment:   Initial Eval Status  Date: 21 Treatment Status  Date: Short Term Goals=LTG  Treatment frequency: PRN 2-4 x/week   Feeding setup   supervision   Grooming Min  A standing at sink for hands and hair  Supervision with ww    UB Dressing Min A   SBA   LB Dressing Max A   SBA with AE prn    Bathing Max A   SBA with seat and LHS     Toileting Mod A   SBA to elevated commode ww   Bed Mobility  Supine to sit: mod A    Sit to supine: n/t  Supine to sit: mod I log rolling   Sit to supine: mod I    Functional Transfers Sit to stand: min A   Stand to sit:  Min A   Stand pivot: min A with ww and cues for walker safety and sequencing  Mod I with ww   Functional Mobility Min A with ww  Mod I with ww   Balance Sitting:     Static:  SBA    Dynamic:CGA  Standing: min A      Activity Tolerance Fair+ O2 91% RA  HR 66  good   Visual/  Perceptual Glasses: yes [facility] Safety Fair-                 Fair+     Hand dominance: R   UE ROM: RUE:  WFL  LUE:  WFL  Strength: RUE:  4/5 LUE:  4/5   Strength: R  WFL   L WFL   Fine Motor Coordination: R WFL  L WFL    Hearing: WFL  Sensation:  No c/o numbness or tingling  Tone:  WFL  Edema: R facial                            Comments: Nursing approval to work with patient given. Upon arrival, patient supine and agreeable to evaluation. OT evaluation performed with  education on benefits of mobility and safety with ADL completion. Patient cooperative and motivated At end of session, patient sitting up in chair and  with call light and phone within reach, all lines and tubes intact. Nursing notified. OT treatment: OT for functional assessment of  ADL, Functional Transfer/Mobility Training, Equipment Needs, Energy Conservation Techniques, Pt/Family Education, Ther Ex- deep breathing for edema and pain control., OT role and POC reviewed. Patient  demo fair- understanding of spinal precautions and safety with ADL completion. Skilled occupational therapy services provided include instruction/training on safety and adapted techniques for completion of therapeutic activities, ADLs/IADLs, and therapeutic exercise deep breathing for edema and pain control. Therapist educated pt on spinal precautions. Skilled monitoring of O2 sats, HR, and pt response throughout treatment. OT treatment provided this date includes:  ·  ADL-  Instruction/training on safety and adapted techniques for completion of ADLs: Pt. Demonstrates fair understanding of precautions & techniques  ·  Functional Mobility-  Instruction/training on safety and improved independence with bed mobility, /functional transfers using ww   ·  Sitting EOB SBA 5 minutes to improve dynamic sitting balance and activity tolerance during ADLs. ·  Activity tolerance- Instruction/training on energy conservation/work simplification for completion of ADLs:. Pt. Demo fair tolerance due to pain and safety this day   ·  Cognitive retraining -  Cues for safety/technique during bed mobility, sitting balance/seated ADLs, no cues for sequencing, problem solving WFL  ·  Skilled positioning/alignment-  Proper Positioning/Alignment due to spinal precautions  ·  Skilled monitoring of O2 sats-  refer to activity tolerance reporting  · Therapeutic Exercise- Instruction on deep breathing  exercise to improve functional Ind. with ADLs             Patient would  benefit from continued skilled OT to increase functional independence and quality of life.     Eval Complexity: mod     Assessment of current deficits   Functional mobility [x]  ADLs [x] Strength [x]  Cognition [x]  Functional transfers  [x] IADLs [x] Safety Awareness [x]  Endurance [x]  Fine Motor Coordination [] Balance [x] Vision/perception [] Sensation []   Gross Motor Coordination [x] ROM [] Delirium []                  Motor Control []    Plan of Care: OT 1-3x/week for 5-7 days PRN   Instruction/training on adapted ADL techniques and AE recommendations to increase functional independence within precautions  Training on energy conservation strategies/techniques to improve independence/tolerance for self-care routine  Functional transfer/mobility training/DME recommendations for increased independence, safety, and fall prevention  Patient/Family education to increase follow through with safety techniques and functional independence  Recommendation of environmental modifications for increased safety with functional transfers/mobility and ADLs  Therapeutic exercise to improve motor endurance, ROM, and functional strength for ADLs/functional transfers  Therapeutic activities to facilitate/challenge dynamic balance, stand tolerance, fine motor dexterity/in-hand manipulation for increased independence with ADLs Positioning to improve functional independence    Rehab Potential: Good for established goals    Patient / Family Goal: decrease pain- back to facility    Evaluation time includes thorough review of current medical information, gathering information on past medical & social history & PLOF, completion of standardized testing, informal observation of tasks, consultation with other medical professions/disciplines, assessment of data & development of POC/goals. Patient and/or family were instructed diagnosis, prognosis/goals and plan of care. yes Demonstrated fair+ understanding. Min Units   OT Eval Low 69320     OT Eval Medium 20774 X    OT Eval High [id number]     OT Re-Eval [id number]     Therapeutic Ex [id number]     Therapeutic Activities 03203 15 1   ADL/Self Care 26313     Orthotic Management 93278     Neuro Re-Ed 73870     Non-Billable Time              Time In:   8:45         Time Out: 9:05             [] Malnutrition indicators have been identified and nursing has been notified to ensure a dietitian consult is ordered. mod OT Evaluation + 15  treatment minutes    Sarah Mayers.  Darin 72, Rogelio 70

## 2021-01-20 NOTE — DISCHARGE INSTR - COC
Continuity of Care Form    Patient Name: Christen Bustamante   :  1940  MRN:  [id number]    Admit date:  2021  Discharge date:  ***    Code Status Order: DNR-CCA   Advance Directives:   [facility] Documentation     Date/Time Healthcare Directive Type of Healthcare Directive Copy in [facility] [address] Agent's Name Healthcare Agent's Phone Number    [phone]  Yes, patient has an advance directive for healthcare treatment    No, copy requested from [facility] Physician:  Koki Manley MD  PCP: No primary care provider on file. Discharging Nurse: [facility] Unit/Room#: 6054/2368-M  Discharging Unit Phone Number: ***    Emergency Contact:   Extended Emergency Contact Information  Primary Emergency Contact: [address] [facility] Phone: [phone]  Mobile Phone: [phone]  Relation: Child  Preferred language: English   needed? No    Past Surgical History:  History reviewed. No pertinent surgical history. Immunization History: There is no immunization history on file for this patient.     Active Problems:  Patient Active Problem List   Diagnosis Code    Trauma T14.90XA       Isolation/Infection:   Isolation          No Isolation        Patient Infection Status     None to display          Nurse Assessment:  Last Vital Signs: BP (!) 150/67   Pulse 64   Temp 97.3 °F (36.3 °C) (Temporal)   Resp 16   Ht 5' 7\" (1.702 m)   Wt 152 lb (68.9 kg)   SpO2 92%   BMI 23.81 kg/m²     Last documented pain score (0-10 scale): Pain Level: 2  Last Weight:   Wt Readings from Last 1 Encounters:   21 152 lb (68.9 kg)     Mental Status:  oriented, alert, logical and able to concentrate and follow conversation    IV Access:  - None    Nursing Mobility/ADLs:  Walking   Dependent  Transfer  [address] Med Delivery   whole    Wound Care Documentation and Therapy:        Elimination:  Continence:   · Bowel: No  · Bladder: No  Urinary Catheter: None   Colostomy/Ileostomy/Ileal Conduit: No       Date of Last BM: 1/19/2021    Intake/Output Summary (Last 24 hours) at 1/20/2021 1416  Last data filed at 1/20/2021 1311  Gross per 24 hour   Intake 550 ml   Output 1800 ml   Net -1250 ml     I/O last 3 completed shifts: In: 120 [P.O.:120]  Out: 1800 [Urine:1800]    Safety Concerns:     History of Falls (last 30 days)    Impairments/Disabilities:      Vision    Nutrition Therapy:  Current Nutrition Therapy:   - Oral Diet:  General    Routes of Feeding: Oral  Liquids: No Restrictions  Daily Fluid Restriction: no  Last Modified Barium Swallow with Video (Video Swallowing Test): not done    Treatments at the Time of Hospital Discharge:   Respiratory Treatments:     Oxygen Therapy:  is not on home oxygen therapy.   Ventilator:    - No ventilator support    Rehab Therapies: Physical Therapy and Occupational Therapy  Weight Bearing Status/Restrictions: No weight bearing restirctions  Other Medical Equipment (for information only, NOT a DME order):  wheelchair and walker  Other Treatments:       Patient's personal belongings (please select all that are sent with patient):  Glasses    RN SIGNATURE:  Electronically signed by Shaylee Osborn RN on 1/21/21 at 3:15 PM EST    CASE MANAGEMENT/SOCIAL WORK SECTION    Inpatient Status Date: ***    Readmission Risk Assessment Score:  Readmission Risk              Risk of Unplanned Readmission:        12           Discharging to Facility/ Agency   · Name: [facility]  · Address:  · Phone:  · Fax:    Dialysis Facility (if applicable)   · Name:  · Address:  · Dialysis Schedule:  · Phone:  · Fax:    / signature: Electronically signed by MAURILIO Webber on 1/20/2021 at 2:16 PM      PHYSICIAN SECTION    Prognosis: {Prognosis:0083265189} Condition at Discharge: [facility] Patient Condition:[id number]}    Rehab Potential (if transferring to Rehab): {Prognosis:3706692101}    Recommended Labs or Other Treatments After Discharge: ***    Physician Certification: I certify the above information and transfer of Christen Busatmante  is necessary for the continuing treatment of the diagnosis listed and that she requires Acute Rehab for less 30 days.      Update Admission H&P: {CHP DME Changes in ZKLBE:099529033}    PHYSICIAN SIGNATURE:  {Esignature:[id number]}

## 2021-01-20 NOTE — PROGRESS NOTES
Physical Therapy    Physical Therapy Initial Assessment     Name: Christen Bustamante  : 1940  MRN: [id number]    Referring Provider:  Melissa Fernandez DO    Date of Service: 2021    Evaluating PT:  Mary Bullock PT, DPT PT [id number]    Room #:  1699/4515-V  Diagnosis:  GLF:   C4 Fx  PMHx/PSHx:  Dementia  Procedure/Surgery:    Precautions:  Custom C Collar, Falls  Equipment Needs:  TBD    SUBJECTIVE:    Pt from [facility]. Pt ambulated with [employer] independently PTA. Assisted with homemaking. Equipment Owned:  [employer]    OBJECTIVE:   Initial Evaluation  Date: 21 Treatment Short Term/ Long Term   Goals   AM-PAC 6 Clicks 99/19     Was pt agreeable to Eval/treatment? Yes     Does pt have pain? Mild low back pain and headache     Bed Mobility  Rolling: Shae  Supine to sit: ModA  Sit to supine: NT  Scooting: Shae  Rolling: Supervision  Supine to sit: Supervision  Sit to supine: Supervision  Scooting: Supervision     Transfers Sit to stand: hSae  Stand to sit: Shae  Stand pivot: hSae   Sit to stand: Supervision  Stand to sit: Supervision  Stand pivot: Supervision   Ambulation    25, 25 feet with Shae [employer]  >150 feet with Supervision [employer]   Stair negotiation: ascended and descended  NT     ROM BUE:  See OT Note  BLE:  WFL     Strength BUE:  See OT Note  BLE:  3+/5  Improve Strength 1/3 MMT Grade   Balance Sitting EOB:  SBA  Dynamic Standing:  Shae [employer]  Sitting EOB: Supervision  Dynamic Standing:  Supervision AAD     Pt is A & O x (self, place, year, month) Disoriented situation. Sensation:  Pt denies numbness and tingling to extremities  Edema:   WNL    Vitals:  HR 61  Spo2 97%  PRE  /71  HR 61  Spo2 97%  POST      Patient education  Pt educated on role of PT    Patient response to education:   Pt verbalized understanding Pt demonstrated skill Pt requires further education in this area   x x x     ASSESSMENT: Comments:  Pt received in supine agreeable to PT evaluation. Pt educated on spinal precautions. Pt requiring assist of trunk and BLEs for supine to sit transfer. Pt sitting statically without assist. Pt requiring cues and assist for functional transfers and ambulation. Pt ambulates with unsteady gait at decreased speed. Patient would benefit from continued skilled PT to maximize functional mobility independence. Treatment:  Patient practiced and was instructed in the following treatment:    ? Bed mobility- verbal cues for positioning and sequencing   ? Functional transfers-Verbal cues for proper positioning and sequencing to perform transfers safely with maximum independence. ? Gait training-Verbal cues for proper positioning and sequencing using assistive device to maximize functional mobility independence. Pt's/ family goals   1. Get better    Patient and or family understand(s) diagnosis, prognosis, and plan of care. yes    PLAN:      PLAN OF CARE:    Current Treatment Recommendations     [x] Strengthening     [x] ROM   [x] Balance Training   [x] Endurance Training   [x] Transfer Training   [x] Gait Training   [x] Stair Training   [x] Positioning   [x] Safety and Education Training   [x] Patient/Caregiver Education   [x] HEP  [] Other       PT care will be provided in accordance with the objectives noted above. Exercises and functional mobility practice will be used as well as appropriate assistive devices or modalities to obtain goals. Patient and family education will also be administered as needed. Frequency of treatments: 5-7x/week x 1-2 weeks.     Time in  0830  Time out  0900    Total Treatment Time  10 minutes Evaluation Time includes thorough review of current medical information, gathering information on past medical history/social history and prior level of function, completion of standardized testing/informal observation of tasks, assessment of data and education on plan of care and goals.     CPT codes:  [x] Low Complexity PT evaluation 07670  [] Moderate Complexity PT evaluation 41690  [] High Complexity PT evaluation 12409  [] PT Re-evaluation 77172  [] Gait training 84336 0 minutes  [] Manual therapy 23009 0 minutes  [x] Therapeutic activities 11422 10 minutes  [] Therapeutic exercises 23848 0 minutes  [] Neuromuscular reeducation 20240 0 minutes       Kiana Douglass PT, DPT   [id number]

## 2021-01-20 NOTE — PROGRESS NOTES
[facility]  PROGRESS NOTE  ATTENDING NOTE        TRAUMA  MECHANISM:  fall    Chief Complaint   Patient presents with    Consultation     trauma consult from [facility]. fall was at assisted living, unwitnessed. pt amnesic to event. has c4 fx, arrived with c-collar in place       HPI  The patient is a 80 y/o female who sustained a GLF earlier today. The patient reported  acute, constant  sharp pain localized to the cervical spine that started immediately. The intensity of the pain is 4/10. Pain does not radiate. There are no alleviating or worsening factors regarding the pain. The patient was transported by EMS to the [address] [facility] from scene. Evaluation prior to arrival included: H&P. Treatment prior to arrival included: CT head and c-spine. A trauma consult was requested to assist, guide,  and expedite further evaluation and treatment for the patient. Patient Active Problem List   Diagnosis    Trauma       SUBJECTIVE/OVERNIGHT EVENTS:  No issues overnight, received custom c-collar    Review of Systems   Constitutional: Positive for activity change. HENT: Negative. Eyes: Negative. Respiratory: Negative. Cardiovascular: Negative. Gastrointestinal: Negative. Endocrine: Negative. Genitourinary: Negative. Musculoskeletal: Positive for myalgias, neck pain and neck stiffness. Skin: Negative. Allergic/Immunologic: Negative. Neurological: Negative. Hematological: Negative. Psychiatric/Behavioral: Positive for confusion and decreased concentration. /60   Pulse 78   Temp 97 °F (36.1 °C) (Temporal)   Resp 16   Ht 5' 7\" (1.702 m)   Wt 152 lb (68.9 kg)   SpO2 94%   BMI 23.81 kg/m²   Physical Exam  Constitutional:       Appearance: Normal appearance. HENT:      Head: Normocephalic and atraumatic.       Nose: Nose normal.      Mouth/Throat:      Mouth: Mucous membranes are moist. Pharynx: Oropharynx is clear. Eyes:      Extraocular Movements: Extraocular movements intact. Pupils: Pupils are equal, round, and reactive to light. Neck:      Comments: In cervical collar  Cardiovascular:      Rate and Rhythm: Normal rate and regular rhythm. Pulses: Normal pulses. Heart sounds: Normal heart sounds. Pulmonary:      Effort: Pulmonary effort is normal.      Breath sounds: Normal breath sounds. Abdominal:      General: There is no distension. Palpations: Abdomen is soft. Tenderness: There is no abdominal tenderness. Musculoskeletal:      Right lower leg: No edema. Left lower leg: No edema. Skin:     General: Skin is warm and dry. Neurological:      General: No focal deficit present. Mental Status: She is alert. Comments: GCS 14--dementia, baseline   Psychiatric:         Mood and Affect: Mood normal.         Behavior: Behavior normal.         Thought Content: Thought content normal.         Judgment: Judgment normal.         ASSESSMENT/PLAN:  1. C4 fracture--NSGY following, c-collar  2. Concussion with LOC--monitor for post concussive symptoms  3.   Dementia--c/w home meds    INCIDENTAL FINDINGS:  Congenital hypoplastic left vertebral artery; multinodular goiter; upper lung bilateral calcified pleural plaques    AMPAC:  16/24    DVT/GI ppx:  Heparin subq/PPI    Laci Hill MD, MSc, FACS  1/20/2021  5:01 PM

## 2021-01-21 VITALS
RESPIRATION RATE: 16 BRPM | WEIGHT: 152 LBS | DIASTOLIC BLOOD PRESSURE: 56 MMHG | TEMPERATURE: 98 F | BODY MASS INDEX: 23.86 KG/M2 | OXYGEN SATURATION: 91 % | HEART RATE: 64 BPM | SYSTOLIC BLOOD PRESSURE: 120 MMHG | HEIGHT: 67 IN

## 2021-01-21 LAB
ANION GAP SERPL CALCULATED.3IONS-SCNC: 9 MMOL/L (ref 7–16)
BUN BLDV-MCNC: 20 MG/DL (ref 8–23)
CALCIUM SERPL-MCNC: 9.3 MG/DL (ref 8.6–10.2)
CHLORIDE BLD-SCNC: 101 MMOL/L (ref 98–107)
CO2: 29 MMOL/L (ref 22–29)
CREAT SERPL-MCNC: 1.1 MG/DL (ref 0.5–1)
GFR AFRICAN AMERICAN: 58
GFR NON-AFRICAN AMERICAN: 48 ML/MIN/1.73
GLUCOSE BLD-MCNC: 103 MG/DL (ref 74–99)
HCT VFR BLD CALC: 37.9 % (ref 34–48)
HEMOGLOBIN: 11 G/DL (ref 11.5–15.5)
MCH RBC QN AUTO: 26.6 PG (ref 26–35)
MCHC RBC AUTO-ENTMCNC: 29 % (ref 32–34.5)
MCV RBC AUTO: 91.8 FL (ref 80–99.9)
PDW BLD-RTO: 16.4 FL (ref 11.5–15)
PLATELET # BLD: 208 E9/L (ref 130–450)
PMV BLD AUTO: 9.9 FL (ref 7–12)
POTASSIUM REFLEX MAGNESIUM: 4.9 MMOL/L (ref 3.5–5)
RBC # BLD: 4.13 E12/L (ref 3.5–5.5)
SARS-COV-2, PCR: NOT DETECTED
SODIUM BLD-SCNC: 139 MMOL/L (ref 132–146)
WBC # BLD: 5.8 E9/L (ref 4.5–11.5)

## 2021-01-21 PROCEDURE — 6360000002 HC RX W HCPCS: Performed by: SURGERY

## 2021-01-21 PROCEDURE — 6370000000 HC RX 637 (ALT 250 FOR IP): Performed by: STUDENT IN AN ORGANIZED HEALTH CARE EDUCATION/TRAINING PROGRAM

## 2021-01-21 PROCEDURE — 2580000003 HC RX 258: Performed by: STUDENT IN AN ORGANIZED HEALTH CARE EDUCATION/TRAINING PROGRAM

## 2021-01-21 PROCEDURE — 85027 COMPLETE CBC AUTOMATED: CPT

## 2021-01-21 PROCEDURE — 97535 SELF CARE MNGMENT TRAINING: CPT

## 2021-01-21 PROCEDURE — 80048 BASIC METABOLIC PNL TOTAL CA: CPT

## 2021-01-21 PROCEDURE — 36415 COLL VENOUS BLD VENIPUNCTURE: CPT

## 2021-01-21 PROCEDURE — 97530 THERAPEUTIC ACTIVITIES: CPT

## 2021-01-21 PROCEDURE — 99238 HOSP IP/OBS DSCHRG MGMT 30/<: CPT | Performed by: SURGERY

## 2021-01-21 PROCEDURE — 6370000000 HC RX 637 (ALT 250 FOR IP): Performed by: SURGERY

## 2021-01-21 RX ORDER — SENNA AND DOCUSATE SODIUM 50; 8.6 MG/1; MG/1
2 TABLET, FILM COATED ORAL 2 TIMES DAILY
Status: DISCONTINUED | OUTPATIENT
Start: 2021-01-21 | End: 2021-01-21 | Stop reason: HOSPADM

## 2021-01-21 RX ORDER — POLYETHYLENE GLYCOL 3350 17 G/17G
17 POWDER, FOR SOLUTION ORAL DAILY
Status: DISCONTINUED | OUTPATIENT
Start: 2021-01-21 | End: 2021-01-21 | Stop reason: HOSPADM

## 2021-01-21 RX ORDER — GABAPENTIN 100 MG/1
100 CAPSULE ORAL 3 TIMES DAILY
Qty: 90 CAPSULE | Refills: 0 | DISCHARGE
Start: 2021-01-21 | End: 2022-02-24

## 2021-01-21 RX ORDER — OLANZAPINE 2.5 MG/1
2.5 TABLET ORAL NIGHTLY
Qty: 30 TABLET | Refills: 3 | DISCHARGE
Start: 2021-01-21

## 2021-01-21 RX ADMIN — GABAPENTIN 100 MG: 100 CAPSULE ORAL at 09:12

## 2021-01-21 RX ADMIN — DOCUSATE SODIUM 50 MG AND SENNOSIDES 8.6 MG 2 TABLET: 8.6; 5 TABLET, FILM COATED ORAL at 09:07

## 2021-01-21 RX ADMIN — MEMANTINE HYDROCHLORIDE 10 MG: 10 TABLET, FILM COATED ORAL at 09:08

## 2021-01-21 RX ADMIN — FOLIC ACID 1 MG: 1 TABLET ORAL at 09:08

## 2021-01-21 RX ADMIN — GABAPENTIN 100 MG: 100 CAPSULE ORAL at 13:56

## 2021-01-21 RX ADMIN — ACETAMINOPHEN 650 MG: 325 TABLET ORAL at 09:07

## 2021-01-21 RX ADMIN — OXYBUTYNIN CHLORIDE 5 MG: 5 TABLET ORAL at 13:56

## 2021-01-21 RX ADMIN — FLUOXETINE HYDROCHLORIDE 40 MG: 20 CAPSULE ORAL at 09:08

## 2021-01-21 RX ADMIN — HEPARIN SODIUM 5000 UNITS: 10000 INJECTION INTRAVENOUS; SUBCUTANEOUS at 06:31

## 2021-01-21 RX ADMIN — OXYBUTYNIN CHLORIDE 5 MG: 5 TABLET ORAL at 09:12

## 2021-01-21 RX ADMIN — LANSOPRAZOLE 15 MG: KIT at 06:32

## 2021-01-21 RX ADMIN — HEPARIN SODIUM 5000 UNITS: 10000 INJECTION INTRAVENOUS; SUBCUTANEOUS at 13:56

## 2021-01-21 RX ADMIN — Medication 10 ML: at 09:08

## 2021-01-21 RX ADMIN — ACETAMINOPHEN 650 MG: 325 TABLET ORAL at 13:56

## 2021-01-21 ASSESSMENT — PAIN DESCRIPTION - PAIN TYPE: TYPE: ACUTE PAIN

## 2021-01-21 ASSESSMENT — ENCOUNTER SYMPTOMS
RESPIRATORY NEGATIVE: 1
GASTROINTESTINAL NEGATIVE: 1
ALLERGIC/IMMUNOLOGIC NEGATIVE: 1
EYES NEGATIVE: 1

## 2021-01-21 ASSESSMENT — PAIN DESCRIPTION - LOCATION: LOCATION: HEAD

## 2021-01-21 ASSESSMENT — PAIN SCALES - GENERAL
PAINLEVEL_OUTOF10: 0
PAINLEVEL_OUTOF10: 3

## 2021-01-21 ASSESSMENT — PAIN DESCRIPTION - DESCRIPTORS: DESCRIPTORS: ACHING

## 2021-01-21 NOTE — CARE COORDINATION
1/21/2021 social work transition of care planning  Sw set up [facility] between 5:30-6:30 pm to [facility]. Armen notified pt's dtr-Judy,jaquelin rep, and charge Nurse.   Electronically signed by MAURILIO Rashid on 1/21/2021 at 2:59 PM

## 2021-01-21 NOTE — CARE COORDINATION
1/21/2021 social work transition of care planning  Pt plan is to discharge to [facility]. Awaiting covid result-will run this afternoon. Sw will follow.   Electronically signed by MAURILIO Alvarez on 1/21/2021 at 10:08 AM

## 2021-01-21 NOTE — PLAN OF CARE
Problem: Falls - Risk of:  Goal: Will remain free from falls  Description: Will remain free from falls  1/21/2021 1254 by Nguyen Bowen RN  Outcome: Ongoing  1/21/2021 0425 by Onur Kyle RN  Outcome: Met This Shift  Goal: Absence of physical injury  Description: Absence of physical injury  1/21/2021 1254 by Nguyen Bowen RN  Outcome: Ongoing  1/21/2021 0425 by Onur Kyle RN  Outcome: Met This Shift     Problem: Pain:  Goal: Pain level will decrease  Description: Pain level will decrease  1/21/2021 1254 by Nguyen Bowen RN  Outcome: Ongoing  1/21/2021 0425 by Onur Kyle RN  Outcome: Met This Shift  Goal: Control of acute pain  Description: Control of acute pain  Outcome: Ongoing  Goal: Control of chronic pain  Description: Control of chronic pain  Outcome: Ongoing

## 2021-01-21 NOTE — PLAN OF CARE
Problem: Falls - Risk of:  Goal: Will remain free from falls  Description: Will remain free from falls  1/21/2021 1748 by James Cramer RN  Outcome: Met This Shift  1/21/2021 1254 by James Cramer RN  Outcome: Ongoing  1/21/2021 0425 by Bina Cohen RN  Outcome: Met This Shift  Goal: Absence of physical injury  Description: Absence of physical injury  1/21/2021 1748 by James Cramer RN  Outcome: Met This Shift  1/21/2021 1254 by James Cramer RN  Outcome: Ongoing  1/21/2021 0425 by Bina Cohen RN  Outcome: Met This Shift     Problem: Pain:  Goal: Pain level will decrease  Description: Pain level will decrease  1/21/2021 1748 by James Cramer RN  Outcome: Met This Shift  1/21/2021 1254 by James Cramer RN  Outcome: Ongoing  1/21/2021 0425 by Bina Cohen RN  Outcome: Met This Shift  Goal: Control of acute pain  Description: Control of acute pain  1/21/2021 1748 by James Cramer RN  Outcome: Met This Shift  1/21/2021 1254 by James Cramer RN  Outcome: Ongoing  Goal: Control of chronic pain  Description: Control of chronic pain  1/21/2021 1748 by James Cramer RN  Outcome: Met This Shift  1/21/2021 1254 by James Cramer RN  Outcome: Ongoing     Problem: Neurological  Goal: Maximum potential motor/sensory/cognitive function  Outcome: Met This Shift     Problem: Skin Integrity:  Goal: Will show no infection signs and symptoms  Description: Will show no infection signs and symptoms  Outcome: Met This Shift  Goal: Absence of new skin breakdown  Description: Absence of new skin breakdown  Outcome: Met This Shift

## 2021-01-21 NOTE — PROGRESS NOTES
Report given to [facility] admission nurse & printed AVS faxed to [phone]. Both IVs removed & EMS escorted patient out of hospital to facility. C-collar in place.

## 2021-01-21 NOTE — PROGRESS NOTES
OT BEDSIDE TREATMENT NOTE      Date:2021  Patient Name: Micah Beth  MRN: [id number]  : 1940  Room: [address] [place], [place] [address]     Per OT Eval:    Evaluating OT: Lavonne Schlatter. ALIYAH JoeR/MAHESH #[id number]     Referring physician: Chikis Franco, DO  AM-PAC Daily Activity Raw Score: 15/24  Recommended Adaptive Equipment: ww, shower seat, BSC, AE for LE dressing and bathing prn      Diagnosis: TRAUMA C4 fracture with possible syncope      Precautions:  Falls, C collar, dementia, spinal precautions     Home Living: Pt lives at [facility]    Bathroom setup: walk in shower with seat   Equipment owned: ww  Prior Level of Function:   States mod I  with ADLs ,  assisted with IADLs; using ww for ambulation. Driving: no                           Medication Management setup  Leisure:AL activity     Pain: none this day 21    Cognition:  A&O: 3/3; Follows 1-2 step directions- unable to state situation              Memory:  Fair-               Sequencing:  Fair-              Problem solving:  Fair- with noted confusion intermittent              Judgement/safety:  Fair-    Functional Assessment:     Initial Eval Status  Date: 21 Treatment Status  Date: 21 Short Term Goals=LTG  Treatment frequency: PRN 2-4 x/week   Feeding setup   set up supervision   Grooming Min  A standing at sink for hands and hair  SBA seated Supervision with ww    UB Dressing Min A   SBA SBA   LB Dressing Max A  Mod A with figure 4 technique at EOB  SBA with AE prn    Bathing Max A  Mod A with sim.  task seated EOB  SBA with seat and LHS     Toileting Mod A  NT, pt. deferred  Mod A per last tx.  SBA to elevated commode ww   Bed Mobility  Supine to sit: mod A    Sit to supine: n/t  Logroll: Min A  Sup <> sit: Min A Supine to sit: mod I log rolling   Sit to supine: mod I    Functional Transfers Sit to stand: min A   Stand to sit:  Min A   Stand pivot: min A with ww and cues for walker safety and sequencing  Sit <> stand: Min A

## 2021-01-21 NOTE — PROGRESS NOTES
Physical Therapy    Physical Therapy Initial Assessment     Name: Wyatt Iraheta  : 1940  MRN: [id number]    Referring Provider:  Jossie Angel DO    Date of Service: 2021    Evaluating PT:  Kiana Douglass PT, DPT PT [id number]    Room #:  6710/6757-S  Diagnosis:  GLF:   C4 Fx  PMHx/PSHx:  Dementia  Procedure/Surgery:    Precautions:  Custom C Collar, Falls  Equipment Needs:  TBD    SUBJECTIVE:    Pt from [facility]. Pt ambulated with [employer] independently PTA. Assisted with homemaking. Equipment Owned:  [employer]    OBJECTIVE:   Initial Evaluation  Date: 21 Treatment  21 Short Term/ Long Term   Goals   AM-PAC 6 Clicks  58/01    Was pt agreeable to Eval/treatment? Yes yes    Does pt have pain? Mild low back pain and headache Minimal pain noted. Bed Mobility  Rolling: Shae  Supine to sit: ModA  Sit to supine: NT  Scooting: Shae Rolling: Shae  Supine to sit: Shae  Sit to supine: Min A  Scooting: Shae Rolling: Supervision  Supine to sit: Supervision  Sit to supine: Supervision  Scooting: Supervision     Transfers Sit to stand: Shae  Stand to sit: Shae  Stand pivot: Shae  Sit to stand: Shae  Stand to sit: Shae  Stand pivot: Shae Sit to stand: Supervision  Stand to sit: Supervision  Stand pivot: Supervision   Ambulation    25, 25 feet with Shae [employer]  40 feet with Shae [employer] >150 feet with Supervision [employer]   Stair negotiation: ascended and descended  NT NT    ROM BUE:  See OT Note  BLE:  WFL     Strength BUE:  See OT Note  BLE:  3+/5 BUE:  See OT Note  BLE:  4-/5 Improve Strength 1/3 MMT Grade   Balance Sitting EOB:  SBA  Dynamic Standing:  Shae [employer]  Sitting EOB: Supervision  Dynamic Standing:  Supervision AAD     Pt is A & O x (self, place, year, month) Disoriented situation. Sensation:  Pt denies numbness and tingling to extremities  Edema:   WNL    Vitals:  HR 61  Spo2 97%  PRE    HR 61  Spo2 97%  POST      Patient education  Pt educated on role of PT    Patient response to education: Pt verbalized understanding Pt demonstrated skill Pt requires further education in this area   x x x     ASSESSMENT:    Comments:  Pt received in supine agreeable to PT session. . Pt educated on spinal precautions. Pt requiring assist of trunk and BLEs for supine to sit transfer. Pt sitting statically without assist. Pt requiring cues and assist for functional transfers and ambulation. Pt ambulates with unsteady gait at decreased speed. Patient would benefit from continued skilled PT to maximize functional mobility independence. Treatment:  Patient practiced and was instructed in the following treatment:    ? Bed mobility- verbal cues for positioning and sequencing   ? Functional transfers-Verbal cues for proper positioning and sequencing to perform transfers safely with maximum independence. ? Gait training-Verbal cues for proper positioning and sequencing using assistive device to maximize functional mobility independence. Pt's/ family goals   1. Get better    Patient and or family understand(s) diagnosis, prognosis, and plan of care. yes    PLAN:      PLAN OF CARE:    Current Treatment Recommendations     [x] Strengthening     [x] ROM   [x] Balance Training   [x] Endurance Training   [x] Transfer Training   [x] Gait Training   [x] Stair Training   [x] Positioning   [x] Safety and Education Training   [x] Patient/Caregiver Education   [x] HEP  [] Other       PT care will be provided in accordance with the objectives noted above. Exercises and functional mobility practice will be used as well as appropriate assistive devices or modalities to obtain goals. Patient and family education will also be administered as needed. Frequency of treatments: 5-7x/week x 1-2 weeks.     Time in  1010  Time out  1035    Total Treatment Time  25 minutes Evaluation Time includes thorough review of current medical information, gathering information on past medical history/social history and prior level of function, completion of standardized testing/informal observation of tasks, assessment of data and education on plan of care and goals.     CPT codes:  [] Low Complexity PT evaluation 11350  [] Moderate Complexity PT evaluation 49493  [] High Complexity PT evaluation 10882  [] PT Re-evaluation 79518  [] Gait training 15078 0 minutes  [] Manual therapy 36406 0 minutes  [x] Therapeutic activities 11666 25 minutes  [] Therapeutic exercises 55078 0 minutes  [] Neuromuscular reeducation 53934 0 minutes       Ella James, [id number] [facility]

## 2021-01-21 NOTE — PROGRESS NOTES
[facility]  PROGRESS NOTE  ATTENDING NOTE        TRAUMA  MECHANISM:  fall    Chief Complaint   Patient presents with    Consultation     trauma consult from [facility] fall was at assisted living, unwitnessed. pt amnesic to event. has c4 fx, arrived with c-collar in place       HPI  The patient is a 78 y/o female who sustained a GLF earlier today. The patient reported  acute, constant  sharp pain localized to the cervical spine that started immediately. The intensity of the pain is 4/10. Pain does not radiate. There are no alleviating or worsening factors regarding the pain. The patient was transported by EMS to the [address] [facility] from scene. Evaluation prior to arrival included: H&P. Treatment prior to arrival included: CT head and c-spine. A trauma consult was requested to assist, guide,  and expedite further evaluation and treatment for the patient. Patient Active Problem List   Diagnosis    Trauma       SUBJECTIVE/OVERNIGHT EVENTS:  No issues overnight, received custom c-collar    Review of Systems   Constitutional: Positive for activity change. HENT: Negative. Eyes: Negative. Respiratory: Negative. Cardiovascular: Negative. Gastrointestinal: Negative. Endocrine: Negative. Genitourinary: Negative. Musculoskeletal: Positive for myalgias, neck pain and neck stiffness. Skin: Negative. Allergic/Immunologic: Negative. Neurological: Negative. Hematological: Negative. Psychiatric/Behavioral: Positive for confusion and decreased concentration. BP (!) 141/71   Pulse 75   Temp 97.8 °F (36.6 °C) (Temporal)   Resp 16   Ht 5' 7\" (1.702 m)   Wt 152 lb (68.9 kg)   SpO2 93%   BMI 23.81 kg/m²   Physical Exam  Constitutional:       Appearance: Normal appearance. HENT:      Head: Normocephalic and atraumatic.       Nose: Nose normal.      Mouth/Throat:      Mouth: Mucous membranes are moist. Pharynx: Oropharynx is clear. Eyes:      Extraocular Movements: Extraocular movements intact. Pupils: Pupils are equal, round, and reactive to light. Neck:      Comments: In cervical collar  Cardiovascular:      Rate and Rhythm: Normal rate and regular rhythm. Pulses: Normal pulses. Heart sounds: Normal heart sounds. Pulmonary:      Effort: Pulmonary effort is normal.      Breath sounds: Normal breath sounds. Abdominal:      General: There is no distension. Palpations: Abdomen is soft. Tenderness: There is no abdominal tenderness. Musculoskeletal:      Right lower leg: No edema. Left lower leg: No edema. Skin:     General: Skin is warm and dry. Neurological:      General: No focal deficit present. Mental Status: She is alert. Comments: GCS 14--dementia, baseline   Psychiatric:         Mood and Affect: Mood normal.         Behavior: Behavior normal.         Thought Content: Thought content normal.         Judgment: Judgment normal.         ASSESSMENT/PLAN:  1. C4 fracture--NSGY following, c-collar  2. Concussion with LOC--monitor for post concussive symptoms  3.   Dementia--c/w home meds    INCIDENTAL FINDINGS:  Congenital hypoplastic left vertebral artery; multinodular goiter; upper lung bilateral calcified pleural plaques    AMPAC:  16/24    DVT/GI ppx:  Heparin subq/PPI    Ok to discharge to Kayla Culp MD, MSc, FACS  1/21/2021  11:51 AM

## 2021-05-28 NOTE — PROGRESS NOTES
Neurosurg progress note  VITALS:  BP (!) 141/71   Pulse 75   Temp 97.8 °F (36.6 °C) (Temporal)   Resp 16   Ht 5' 7\" (1.702 m)   Wt 152 lb (68.9 kg)   SpO2 93%   BMI 23.81 kg/m²   24HR INTAKE/OUTPUT:    Intake/Output Summary (Last 24 hours) at 1/21/2021 1348  Last data filed at 1/21/2021 1106  Gross per 24 hour   Intake 405 ml   Output 1000 ml   Net -595 ml     Cta Neck W Contrast    Result Date: 1/19/2021 The patient is Stable - Low risk of patient condition declining or worsening    Shift Goals  Clinical Goals: Work ith IS, inc mobility, pain control  Patient Goals: Remove the chest tube, floor with shower  Family Goals: Transfer to the floor with bathroom and shower    Progress made toward(s) clinical / shift goals:  yes    Problem: Knowledge Deficit - Standard  Goal: Patient and family/care givers will demonstrate understanding of plan of care, disease process/condition, diagnostic tests and medications  Outcome: Progressing     Problem: Respiratory  Goal: Patient will achieve/maintain optimum respiratory ventilation and gas exchange  Outcome: Progressing     Problem: Urinary Elimination  Goal: Establish and maintain regular urinary output  Outcome: Progressing     Problem: Venous Thromboembolism (VTE) Prevention  Goal: The patient will remain free from venous thromboembolism (VTE)  Outcome: Progressing      EXAMINATION: CTA OF THE NECK 1/19/2021 4:49 am TECHNIQUE: CTA of the neck was performed with the administration of intravenous contrast. Multiplanar reformatted images are provided for review. MIP images are provided for review. Stenosis of the internal carotid arteries measured using NASCET criteria. Dose modulation, iterative reconstruction, and/or weight based adjustment of the mA/kV was utilized to reduce the radiation dose to as low as reasonably achievable. COMPARISON: None. HISTORY: ORDERING SYSTEM PROVIDED HISTORY: trauma TECHNOLOGIST PROVIDED HISTORY: Reason for exam:->trauma Has a \"code stroke\" or \"stroke alert\" been called? ->No FINDINGS: AORTIC ARCH/ARCH VESSELS: No dissection or arterial injury. No significant stenosis of the brachiocephalic or subclavian arteries. CAROTID ARTERIES: Medial wall atherosclerotic calcification causes approximately 30% narrowing of the proximal left internal carotid artery. No dissection, arterial injury, or hemodynamically significant stenosis by NASCET criteria. 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 No dissection, arterial injury, or significant stenosis. SOFT TISSUES: The lung apices are clear. No cervical or superior mediastinal lymphadenopathy. The larynx and pharynx are unremarkable. Enlargement of the left greater than right thyroid lobes is noted with multifocal numerous hypodense nodular densities, with the largest on the left measuring up to 27 mm in diameter. Negar Dues BONES: C4 vertebral body anteroinferior mildly displaced oblique acute fracture is present. Partial visualization of bilateral lung pleural calcified plaques are seen. 1. No evidence of acute trauma involving the major arterial vessels of the neck. 2. C4 vertebral body anteroinferior mildly displaced oblique acute fracture. 3. Left internal carotid artery proximal medial wall atherosclerotic calcification causing approximately 30% stenosis. Finding is compatible with 16-49% stenosis per sonographic NASCET index criteria. 4. Congenitally hypoplastic left vertebral artery. 5. Incidental finding of multinodular goiter, as discussed above. Recommend clinical correlation. 6. 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     CBC:   Lab Results   Component Value Date    WBC 5.8 01/21/2021    RBC 4.13 01/21/2021    HGB 11.0 01/21/2021    HCT 37.9 01/21/2021    MCV 91.8 01/21/2021    MCH 26.6 01/21/2021    MCHC 29.0 01/21/2021    RDW 16.4 01/21/2021     01/21/2021    MPV 9.9 01/21/2021     BMP:    Lab Results   Component Value Date     01/21/2021    K 4.9 01/21/2021     01/21/2021    CO2 29 01/21/2021    BUN 20 01/21/2021    LABALBU 3.7 01/19/2021    LABALBU 4.3 07/29/2011    CREATININE 1.1 01/21/2021    CALCIUM 9.3 01/21/2021    GFRAA 58 01/21/2021    LABGLOM 48 01/21/2021    GLUCOSE 103 01/21/2021    GLUCOSE 87 07/29/2011      sennosides-docusate sodium  2 tablet Oral BID    polyethylene glycol  17 g Oral Daily    heparin (porcine)  5,000 Units Subcutaneous 3 times per day    sodium chloride flush  10 mL Intravenous 2 times per day    FLUoxetine  40 mg Oral Daily    atorvastatin  40 mg Oral Nightly    folic acid  1 mg Oral Daily    gabapentin  100 mg Oral TID    memantine  10 mg Oral Daily    OLANZapine  2.5 mg Oral Nightly    lansoprazole  15 mg Oral QAM AC    oxybutynin  5 mg Oral TID     Remains awake and alert, perrl eomi collar in place  Assessment:  Patient Active Problem List   Diagnosis    Trauma     Plan:Continue current care  Anay More M.D.

## 2021-06-17 RX ORDER — SIMVASTATIN 40 MG
40 TABLET ORAL NIGHTLY
COMMUNITY

## 2021-06-17 RX ORDER — PREGABALIN 50 MG/1
50 CAPSULE ORAL 3 TIMES DAILY
COMMUNITY

## 2021-06-17 RX ORDER — RIZATRIPTAN BENZOATE 10 MG/1
10 TABLET, ORALLY DISINTEGRATING ORAL
COMMUNITY

## 2021-06-17 RX ORDER — PAROXETINE HYDROCHLORIDE 20 MG/1
20 TABLET, FILM COATED ORAL DAILY
COMMUNITY

## 2021-06-17 RX ORDER — OMEPRAZOLE 20 MG/1
20 CAPSULE, DELAYED RELEASE ORAL DAILY
COMMUNITY

## 2021-06-17 RX ORDER — IBUPROFEN 600 MG/1
600 TABLET ORAL EVERY 6 HOURS PRN
COMMUNITY
End: 2022-02-24 | Stop reason: ALTCHOICE

## 2022-02-24 ENCOUNTER — HOSPITAL ENCOUNTER (EMERGENCY)
Age: 82
Discharge: HOME | End: 2022-02-24
Payer: MEDICARE

## 2022-02-24 ENCOUNTER — APPOINTMENT (OUTPATIENT)
Dept: GENERAL RADIOLOGY | Age: 82
End: 2022-02-24
Payer: MEDICARE

## 2022-02-24 VITALS
RESPIRATION RATE: 20 BRPM | WEIGHT: 140 LBS | DIASTOLIC BLOOD PRESSURE: 69 MMHG | OXYGEN SATURATION: 95 % | TEMPERATURE: 98.6 F | BODY MASS INDEX: 21.93 KG/M2 | SYSTOLIC BLOOD PRESSURE: 146 MMHG | HEART RATE: 79 BPM

## 2022-02-24 DIAGNOSIS — S39.012A LUMBAR STRAIN, INITIAL ENCOUNTER: Primary | ICD-10-CM

## 2022-02-24 PROCEDURE — 72100 X-RAY EXAM L-S SPINE 2/3 VWS: CPT

## 2022-02-24 PROCEDURE — 99211 OFF/OP EST MAY X REQ PHY/QHP: CPT

## 2022-02-24 PROCEDURE — 72170 X-RAY EXAM OF PELVIS: CPT

## 2022-02-24 RX ORDER — POLYVINYL ALCOHOL 14 MG/ML
1 SOLUTION/ DROPS OPHTHALMIC 3 TIMES DAILY
COMMUNITY

## 2022-02-24 RX ORDER — DULOXETIN HYDROCHLORIDE 30 MG/1
30 CAPSULE, DELAYED RELEASE ORAL DAILY
COMMUNITY

## 2022-02-24 RX ORDER — TEMAZEPAM 7.5 MG/1
7.5 CAPSULE ORAL NIGHTLY PRN
COMMUNITY

## 2022-02-24 RX ORDER — METHYLPREDNISOLONE 4 MG/1
TABLET ORAL
Qty: 1 KIT | Status: SHIPPED | OUTPATIENT
Start: 2022-02-24

## 2022-02-24 ASSESSMENT — PAIN SCALES - GENERAL: PAINLEVEL_OUTOF10: 8

## 2022-02-24 ASSESSMENT — PAIN DESCRIPTION - ORIENTATION: ORIENTATION: RIGHT;LEFT;LOWER

## 2022-02-24 ASSESSMENT — PAIN DESCRIPTION - FREQUENCY: FREQUENCY: CONTINUOUS

## 2022-02-24 ASSESSMENT — PAIN DESCRIPTION - LOCATION: LOCATION: BACK;HIP

## 2022-02-24 ASSESSMENT — PAIN DESCRIPTION - PROGRESSION: CLINICAL_PROGRESSION: NOT CHANGED

## 2022-02-24 ASSESSMENT — PAIN DESCRIPTION - DESCRIPTORS: DESCRIPTORS: ACHING;SORE

## 2022-02-24 ASSESSMENT — PAIN - FUNCTIONAL ASSESSMENT: PAIN_FUNCTIONAL_ASSESSMENT: 0-10

## 2022-02-24 ASSESSMENT — PAIN DESCRIPTION - ONSET: ONSET: SUDDEN

## 2022-02-24 ASSESSMENT — PAIN DESCRIPTION - PAIN TYPE: TYPE: ACUTE PAIN

## 2022-02-24 NOTE — ED PROVIDER NOTES
[id number]  Department of Emergency Medicine  UC Encounter Note  22   3:33 PM EST      NAME: Angelica Frederick  :  1940  MRN:  [id number]    Chief Complaint: Fall (States she has fallen x2 in the past 2 weeks. Having pain in lower back and both hips. Denies hitting head or LOC.)      This is an 19-year-old female the presents to urgent care complaining of lower back and pelvic pain for the past 2 weeks. She states she has fallen twice within that time period. She denies head neck or other back pain. No chest pain or abdominal pain. States pain is worse with movement. Is on pain medication. No numbness or tingling. No bowel or bladder dysfunction. She does use a walker. On first contact patient she appears to be in no acute distress          Review of Systems  Pertinent positives and negatives are stated within HPI, all other systems reviewed and are negative. Physical Exam  Vitals and nursing note reviewed. Constitutional:       Appearance: She is well-developed. HENT:      Head: Normocephalic and atraumatic. Right Ear: Hearing and external ear normal.      Left Ear: Hearing and external ear normal.      Nose: Nose normal.      Mouth/Throat:      Pharynx: Uvula midline. Eyes:      General: Lids are normal.      Conjunctiva/sclera: Conjunctivae normal.      Pupils: Pupils are equal, round, and reactive to light. Cardiovascular:      Rate and Rhythm: Normal rate and regular rhythm. Heart sounds: Normal heart sounds. No murmur heard. Pulmonary:      Effort: Pulmonary effort is normal.      Breath sounds: Normal breath sounds. Abdominal:      General: Bowel sounds are normal.      Palpations: Abdomen is soft. Abdomen is not rigid. Tenderness: There is no abdominal tenderness. There is no guarding or rebound. Musculoskeletal:      Cervical back: Normal range of motion and neck supple. Comments: Head and neck are atraumatic.  She does have some lower lumbar back tenderness with palpation. But no bruising noted. She does have some pain that extends into her buttock region. I do not appreciate much bilateral hip discomfort. She is able to stand up with out much difficulty. I do not appreciate any pain in her extremities or abdomen or chest.   Skin:     General: Skin is warm and dry. Findings: No abrasion or rash. Neurological:      General: No focal deficit present. Mental Status: She is alert and oriented to person, place, and time. GCS: GCS eye subscore is 4. GCS verbal subscore is 5. GCS motor subscore is 6. Cranial Nerves: No cranial nerve deficit. Sensory: No sensory deficit. Coordination: Coordination normal.      Gait: Gait normal.         Procedures    MDM  Number of Diagnoses or Management Options  Lumbar strain, initial encounter  Diagnosis management comments: Xray reviewed  Medications reviewed  Is taking her pain medications on a regular basis  Add medrol  Instructions given.            --------------------------------------------- PAST HISTORY ---------------------------------------------  Past Medical History:  has a past medical history of Cardiac arrhythmia, Closed C4 fracture ([facility] Depression, Esophageal dysphagia, Giant cell arteritis [facility] Hyperlipidemia, Hypertension, Idiopathic progressive polyneuropathy, Migraine, Migraine without aura and without status migrainosus, not intractable, Nausea, Rheumatoid arthritis ([facility] Supraspinatus tendinitis, and Urinary incontinence. Past Surgical History:  has a past surgical history that includes Abdomen surgery. Social History:  reports that she has never smoked. She has never used smokeless tobacco. She reports previous alcohol use. She reports that she does not use drugs. Family History: family history includes Cancer in her father; Heart Disease in her mother.      The patients home medications have been reviewed. Allergies: Celebrex [celecoxib] and Macrodantin [nitrofurantoin]    -------------------------------------------------- RESULTS -------------------------------------------------  No results found for this visit on 02/24/22. XR LUMBAR SPINE (2-3 VIEWS)   Final Result   No fracture. Mild left lateral subluxation at L2 over L3 and L3 over L4. Mild right lateral subluxation at T12 over L1. Significant thoracolumbar   scoliosis, with convexity to the left. Significant osteo-degenerative   changes, as described above. 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 Other   findings, as described above. XR PELVIS (1-2 VIEWS)   Final Result   No fracture or dislocation. Small right greater trochanter bone spur. Significant osteo-degenerative changes involving the visualized lumbar spine. Significant lumbar scoliosis, with convexity to the left. Mild left lateral   subluxation of L3 over L4.             ------------------------- NURSING NOTES AND VITALS REVIEWED ---------------------------   The nursing notes within the ED encounter and vital signs as below have been reviewed. BP (!) 146/69   Pulse 79   Temp 98.6 °F (37 °C) (Infrared)   Resp 20   Wt 140 lb (63.5 kg)   SpO2 95%   BMI 21.93 kg/m²   Oxygen Saturation Interpretation: Normal      ------------------------------------------ PROGRESS NOTES ------------------------------------------   I have spoken with the patient and discussed todays results, in addition to providing specific details for the plan of care and counseling regarding the diagnosis and prognosis. Their questions are answered at this time and they are agreeable with the plan.      --------------------------------- ADDITIONAL PROVIDER NOTES ---------------------------------     This patient is stable for discharge. I have shared the specific conditions for return, as well as the importance of follow-up.       * NOTE: This report was transcribed using voice recognition software. Every effort was made to ensure accuracy; however, inadvertent computerized transcription errors may be present.    --------------------------------- IMPRESSION AND DISPOSITION ---------------------------------    IMPRESSION  1.  Lumbar strain, initial encounter        DISPOSITION  Disposition: Discharge to home  Patient condition is good       Didier Hanna PA-C  02/24/22 [id number]

## 2023-11-01 DIAGNOSIS — Z23 NEED FOR INFLUENZA VACCINATION: Primary | ICD-10-CM

## 2023-11-01 PROCEDURE — 90694 VACC AIIV4 NO PRSRV 0.5ML IM: CPT | Performed by: PHYSICIAN ASSISTANT

## 2023-11-01 PROCEDURE — G0008 ADMIN INFLUENZA VIRUS VAC: HCPCS | Performed by: PHYSICIAN ASSISTANT

## 2024-05-27 ENCOUNTER — HOSPITAL ENCOUNTER (EMERGENCY)
Age: 84
Discharge: HOME | End: 2024-05-27
Payer: MEDICARE

## 2024-05-27 VITALS
BODY MASS INDEX: 22.71 KG/M2 | DIASTOLIC BLOOD PRESSURE: 44 MMHG | OXYGEN SATURATION: 96 % | SYSTOLIC BLOOD PRESSURE: 128 MMHG | TEMPERATURE: 98.2 F | WEIGHT: 145 LBS | HEART RATE: 78 BPM | RESPIRATION RATE: 20 BRPM

## 2024-05-27 DIAGNOSIS — N30.00 ACUTE CYSTITIS WITHOUT HEMATURIA: Primary | ICD-10-CM

## 2024-05-27 LAB
BACTERIA URNS QL MICRO: ABNORMAL
BILIRUB UR QL STRIP: NEGATIVE
CLARITY UR: ABNORMAL
COLOR UR: YELLOW
GLUCOSE UR STRIP-MCNC: NEGATIVE MG/DL
HGB UR QL STRIP.AUTO: NEGATIVE
KETONES UR STRIP-MCNC: NEGATIVE MG/DL
LEUKOCYTE ESTERASE UR QL STRIP: NEGATIVE
NITRITE UR QL STRIP: NEGATIVE
PH UR STRIP: 5.5 [PH] (ref 5–9)
PROT UR STRIP-MCNC: NEGATIVE MG/DL
RBC #/AREA URNS HPF: ABNORMAL /HPF
SP GR UR STRIP: 1.02 (ref 1–1.03)
UROBILINOGEN UR STRIP-ACNC: 0.2 EU/DL (ref 0–1)
WBC #/AREA URNS HPF: ABNORMAL /HPF

## 2024-05-27 PROCEDURE — 99211 OFF/OP EST MAY X REQ PHY/QHP: CPT

## 2024-05-27 PROCEDURE — 87086 URINE CULTURE/COLONY COUNT: CPT

## 2024-05-27 PROCEDURE — 81001 URINALYSIS AUTO W/SCOPE: CPT

## 2024-05-27 RX ORDER — FENTANYL 25 UG/1
1 PATCH TRANSDERMAL
COMMUNITY

## 2024-05-27 RX ORDER — CEPHALEXIN 500 MG/1
500 CAPSULE ORAL 2 TIMES DAILY
Qty: 14 CAPSULE | Refills: 0 | Status: SHIPPED | OUTPATIENT
Start: 2024-05-27 | End: 2024-06-03

## 2024-05-27 ASSESSMENT — PAIN - FUNCTIONAL ASSESSMENT: PAIN_FUNCTIONAL_ASSESSMENT: NONE - DENIES PAIN

## 2024-05-27 NOTE — ED PROVIDER NOTES
[facility]  EMERGENCY DEPARTMENT ENCOUNTER        NAME: Charity Simpson  :  1940  MRN:  [id number]  Date of evaluation: 2024  Provider: Ezekiel Timmons PA-C  PCP: George Escamilla MD  Note Started : 11:04 AM EDT 24    Chief Complaint: Altered Mental Status (Daughter brought pt from [facility] for increased confusion, possible UTI)      This is an 84-year-old female who presents to urgent care with her daughter.  Patient resides in a assisted living facility.  Daughter states that the staff and her have been noticing some change in mental status recently.  Patient has been somewhat uncooperative with activities and not moving around as much.  The daughter does state that some of the problem may be due to the increase in the fentanyl patch dosage that could be causing her mom to be more drowsy.  She states that the doctor did decrease the pain medication recently so this may help with her problem.  Daughter is also concerned about possible urinary tract infection.  On first contact patient she appears to be in no acute distress.        Review of Systems  Pertinent positives and negatives are stated within HPI, all other systems reviewed and are negative.     Allergies: Celebrex [celecoxib] and Macrodantin [nitrofurantoin]     --------------------------------------------- PAST HISTORY ---------------------------------------------  Past Medical History:  has a past medical history of Cardiac arrhythmia, Closed C4 fracture (HCC), Dementia (HCC), Depression, Esophageal dysphagia, Giant cell arteritis (HCC), Hyperlipidemia, Hypertension, Idiopathic progressive polyneuropathy, Migraine, Migraine without aura and without status migrainosus, not intractable, Nausea, Rheumatoid arthritis (HCC), Supraspinatus tendinitis, and Urinary incontinence.    Past Surgical History:  has a past surgical history that includes Abdomen surgery.    Social History:  reports that she has never smoked. She

## 2024-05-29 LAB
MICROORGANISM SPEC CULT: ABNORMAL
MICROORGANISM SPEC CULT: ABNORMAL
SPECIMEN DESCRIPTION: ABNORMAL

## 2024-06-20 ENCOUNTER — HOSPITAL ENCOUNTER (EMERGENCY)
Age: 84
Discharge: HOME | End: 2024-06-20
Attending: STUDENT IN AN ORGANIZED HEALTH CARE EDUCATION/TRAINING PROGRAM
Payer: MEDICARE

## 2024-06-20 VITALS
DIASTOLIC BLOOD PRESSURE: 66 MMHG | TEMPERATURE: 97.5 F | WEIGHT: 146 LBS | HEIGHT: 67 IN | HEART RATE: 81 BPM | RESPIRATION RATE: 20 BRPM | BODY MASS INDEX: 22.91 KG/M2 | OXYGEN SATURATION: 92 % | SYSTOLIC BLOOD PRESSURE: 130 MMHG

## 2024-06-20 DIAGNOSIS — T18.108A FOREIGN BODY IN ESOPHAGUS, INITIAL ENCOUNTER: Primary | ICD-10-CM

## 2024-06-20 PROCEDURE — 6370000000 HC RX 637 (ALT 250 FOR IP): Performed by: STUDENT IN AN ORGANIZED HEALTH CARE EDUCATION/TRAINING PROGRAM

## 2024-06-20 PROCEDURE — 99283 EMERGENCY DEPT VISIT LOW MDM: CPT

## 2024-06-20 RX ORDER — ACETAMINOPHEN 500 MG
1000 TABLET ORAL ONCE
Status: COMPLETED | OUTPATIENT
Start: 2024-06-20 | End: 2024-06-20

## 2024-06-20 RX ADMIN — ACETAMINOPHEN 1000 MG: 500 TABLET ORAL at 21:05

## 2024-06-20 ASSESSMENT — PAIN DESCRIPTION - DESCRIPTORS: DESCRIPTORS: DISCOMFORT

## 2024-06-20 ASSESSMENT — PAIN SCALES - GENERAL: PAINLEVEL_OUTOF10: 10

## 2024-06-20 ASSESSMENT — LIFESTYLE VARIABLES
HOW MANY STANDARD DRINKS CONTAINING ALCOHOL DO YOU HAVE ON A TYPICAL DAY: PATIENT DOES NOT DRINK
HOW OFTEN DO YOU HAVE A DRINK CONTAINING ALCOHOL: NEVER

## 2024-06-20 ASSESSMENT — PAIN DESCRIPTION - ORIENTATION: ORIENTATION: LEFT;RIGHT

## 2024-06-20 ASSESSMENT — PAIN DESCRIPTION - LOCATION: LOCATION: LEG;KNEE

## 2024-06-20 NOTE — ED NOTES
Attempted to do ordered evaluation and pt refused it all. Says she feels fine and wants to go home. Dr Melton notified.

## 2024-06-20 NOTE — ED PROVIDER NOTES
evidenced by new need for NIPPV     or mechanical ventilation        [] No criteria met for Severe Sepsis.   Must meet 1:    [] Lactate > 4        or   [] SBP < 90 or MAP < 65 for at        least two readings in the first        hour after fluid bolus        administration      [] Vasopressors initiated (if hypotension persists after fluid resuscitation)                [] No criteria met for Septic Shock.   Patient Vitals for the past 6 hrs:   BP Temp Pulse Resp SpO2   06/20/24 1315 130/66 97.5 °F (36.4 °C) 81 20 92 %      No results for input(s): \"WBC\", \"LACTA\", \"CREATININE\", \"BILITOT\", \"INR\", \"PLT\" in the last 72 hours.     ED Course as of 06/22/24 0732   Thu Jun 20, 2024   9498 Patient feels that symptoms have resolved completely she would like to be discharged home at this time she does not want lab work or an EKG or CT scan at this time she is tolerating p.o. fluids she feels that whenever was stuck has passed she would like to be discharged back to her facility [SS]      ED Course User Index  [SS] Kavya Melton MD       --------------------------------------------- PAST HISTORY ---------------------------------------------  Past Medical History:  has a past medical history of Cardiac arrhythmia, Closed C4 fracture (HCC), Dementia (HCC), Depression, Esophageal dysphagia, Giant cell arteritis (HCC), Hyperlipidemia, Hypertension, Idiopathic progressive polyneuropathy, Migraine, Migraine without aura and without status migrainosus, not intractable, Nausea, Rheumatoid arthritis (HCC), Supraspinatus tendinitis, and Urinary incontinence.    Past Surgical History:  has a past surgical history that includes Abdomen surgery.    Social History:  reports that she has never smoked. She has never used smokeless tobacco. She reports that she does not currently use alcohol. She reports that she does not use drugs.    Family History: family history includes Cancer in her father; Heart Disease in her mother.     The        Disposition:  Patient's disposition: Discharge to nursing home  Patient's condition is stable.       Kavya Melton MD  06/22/24 0734

## 2024-06-21 NOTE — ED NOTES
Attempted to call report to [facility], unable to reach anyone there, report given to EMS transport an dpt en route back to [facility] now.

## 2024-06-22 ASSESSMENT — ENCOUNTER SYMPTOMS
FACIAL SWELLING: 0
TROUBLE SWALLOWING: 1
SORE THROAT: 0
SHORTNESS OF BREATH: 0
ABDOMINAL DISTENTION: 0
PHOTOPHOBIA: 0
VOMITING: 0
CHEST TIGHTNESS: 0
DIARRHEA: 0
ABDOMINAL PAIN: 0
COUGH: 0
NAUSEA: 0

## 2024-07-19 ENCOUNTER — HOSPITAL ENCOUNTER (OUTPATIENT)
Dept: GENERAL RADIOLOGY | Age: 84
End: 2024-07-19
Attending: INTERNAL MEDICINE
Payer: MEDICARE

## 2024-07-19 DIAGNOSIS — R13.10 DYSPHAGIA, UNSPECIFIED TYPE: ICD-10-CM

## 2024-07-19 PROCEDURE — 74230 X-RAY XM SWLNG FUNCJ C+: CPT

## 2024-07-19 PROCEDURE — 92611 MOTION FLUOROSCOPY/SWALLOW: CPT | Performed by: SPEECH-LANGUAGE PATHOLOGIST

## 2024-07-19 PROCEDURE — 2500000003 HC RX 250 WO HCPCS: Performed by: INTERNAL MEDICINE

## 2024-07-19 PROCEDURE — 92526 ORAL FUNCTION THERAPY: CPT | Performed by: SPEECH-LANGUAGE PATHOLOGIST

## 2024-07-19 RX ADMIN — BARIUM SULFATE 45 G: 0.81 POWDER, FOR SUSPENSION ORAL at 10:39

## 2024-07-19 RX ADMIN — BARIUM SULFATE 45 ML: 400 PASTE ORAL at 10:39

## 2024-07-19 RX ADMIN — BARIUM SULFATE 45 ML: 400 SUSPENSION ORAL at 10:39

## 2024-07-19 NOTE — PROGRESS NOTES
SPEECH/LANGUAGE PATHOLOGY  VIDEOFLUOROSCOPIC STUDY OF SWALLOWING (MBS)   and PLAN OF CARE    PATIENT NAME:  Charity Simpson  (female)     MRN:  [id number]    :  1940  (84 y.o.)  STATUS:  Outpatient    TODAY'S DATE:  2024  REFERRING PROVIDER:   Dr. George Escamilla    SPECIFIC PROVIDER ORDER: SLP video swallow  Date of order:  24   REASON FOR REFERRAL: dysphagia    EVALUATING THERAPIST: Mariposa Cueto, SLP      RESULTS:      DYSPHAGIA DIAGNOSIS:  Clinical indicators of moderate-severe oral phase dysphagia  and Clinical indicators of mild  pharyngeal phase dysphagia     DIET RECOMMENDATIONS:  Soft and bite size consistency solids (IDDSI level 6) with  thin liquids (IDDSI level 0)    FEEDING RECOMMENDATIONS:    Assistance level:  Set-up is required for all oral intake  Supervision is needed during all oral intake  Encourage self-feeding as function allows  Verbal cueing for implementation of safe swallow strategies      Compensatory strategies recommended: Thorough oral care to prevent colonization of oral bacteria   Upright in bed/ chair as tolerated  Fully alert for all PO  Chin neutral to slightly down   Encourage oral clearing of bolus before next bite/sip is taken  Check for oral pocketing  Incorporate liquids into solids during mastication before initiating A-P propulsion  Slow rate of intake   SINGLE cup sips  SINGLE straw sips   SMALL bites     Discussed recommendations with:  report sent to referring provider    Laryngeal Penetration and Aspiration:  Penetration WITHOUT aspiration was observed in today's study with  thin liquid--very shallow and inconsistent    SPEECH THERAPY  PLAN OF CARE   The dysphagia POC is established based on physician order and dysphagia diagnosis    Nursing facility speech therapy intervention for dysphagia management is recommended to address the established treatment plan frequency and duration at the discretion of treating SLP      Conditions Requiring Skilled

## 2024-08-18 ENCOUNTER — APPOINTMENT (OUTPATIENT)
Dept: CT IMAGING | Age: 84
End: 2024-08-18
Payer: MEDICARE

## 2024-08-18 ENCOUNTER — HOSPITAL ENCOUNTER (INPATIENT)
Age: 84
LOS: 5 days | Discharge: NURSING, REHAB OR LONG-TERM CARE | End: 2024-08-23
Attending: STUDENT IN AN ORGANIZED HEALTH CARE EDUCATION/TRAINING PROGRAM | Admitting: INTERNAL MEDICINE
Payer: MEDICARE

## 2024-08-18 ENCOUNTER — APPOINTMENT (OUTPATIENT)
Dept: GENERAL RADIOLOGY | Age: 84
End: 2024-08-18
Payer: MEDICARE

## 2024-08-18 DIAGNOSIS — R41.82 ALTERED MENTAL STATUS, UNSPECIFIED ALTERED MENTAL STATUS TYPE: Primary | ICD-10-CM

## 2024-08-18 DIAGNOSIS — J18.9 PNEUMONIA DUE TO INFECTIOUS ORGANISM, UNSPECIFIED LATERALITY, UNSPECIFIED PART OF LUNG: ICD-10-CM

## 2024-08-18 LAB
ALBUMIN SERPL-MCNC: 3.9 G/DL (ref 3.5–5.2)
ALP SERPL-CCNC: 118 U/L (ref 35–104)
ALT SERPL-CCNC: 7 U/L (ref 0–32)
AMPHET UR QL SCN: NEGATIVE
ANION GAP SERPL CALCULATED.3IONS-SCNC: 11 MMOL/L (ref 7–16)
APAP SERPL-MCNC: <5 UG/ML (ref 10–30)
AST SERPL-CCNC: 18 U/L (ref 0–31)
BARBITURATES UR QL SCN: NEGATIVE
BASOPHILS # BLD: 0.05 K/UL (ref 0–0.2)
BASOPHILS NFR BLD: 1 % (ref 0–2)
BENZODIAZ UR QL: POSITIVE
BILIRUB SERPL-MCNC: 0.7 MG/DL (ref 0–1.2)
BILIRUB UR QL STRIP: NEGATIVE
BUN SERPL-MCNC: 11 MG/DL (ref 6–23)
BUPRENORPHINE UR QL: NEGATIVE
CALCIUM SERPL-MCNC: 10.3 MG/DL (ref 8.6–10.2)
CANNABINOIDS UR QL SCN: POSITIVE
CHLORIDE SERPL-SCNC: 101 MMOL/L (ref 98–107)
CLARITY UR: CLEAR
CO2 SERPL-SCNC: 30 MMOL/L (ref 22–29)
COCAINE UR QL SCN: NEGATIVE
COLOR UR: YELLOW
CREAT SERPL-MCNC: 0.9 MG/DL (ref 0.5–1)
EKG ATRIAL RATE: 88 BPM
EKG P AXIS: 71 DEGREES
EKG P-R INTERVAL: 188 MS
EKG Q-T INTERVAL: 356 MS
EKG QRS DURATION: 80 MS
EKG QTC CALCULATION (BAZETT): 430 MS
EKG R AXIS: -18 DEGREES
EKG T AXIS: 58 DEGREES
EKG VENTRICULAR RATE: 88 BPM
EOSINOPHIL # BLD: 0.16 K/UL (ref 0.05–0.5)
EOSINOPHILS RELATIVE PERCENT: 3 % (ref 0–6)
ERYTHROCYTE [DISTWIDTH] IN BLOOD BY AUTOMATED COUNT: 16.7 % (ref 11.5–15)
ETHANOLAMINE SERPL-MCNC: <10 MG/DL (ref 0–0.08)
FENTANYL UR QL: NEGATIVE
GFR, ESTIMATED: 66 ML/MIN/1.73M2
GLUCOSE SERPL-MCNC: 126 MG/DL (ref 74–99)
GLUCOSE UR STRIP-MCNC: 500 MG/DL
HCT VFR BLD AUTO: 37.6 % (ref 34–48)
HGB BLD-MCNC: 11.3 G/DL (ref 11.5–15.5)
HGB UR QL STRIP.AUTO: ABNORMAL
IMM GRANULOCYTES # BLD AUTO: <0.03 K/UL (ref 0–0.58)
IMM GRANULOCYTES NFR BLD: 0 % (ref 0–5)
KETONES UR STRIP-MCNC: >80 MG/DL
LACTATE BLDV-SCNC: 1.6 MMOL/L (ref 0.5–1.9)
LACTATE BLDV-SCNC: 2.2 MMOL/L (ref 0.5–1.9)
LEUKOCYTE ESTERASE UR QL STRIP: NEGATIVE
LYMPHOCYTES NFR BLD: 1.92 K/UL (ref 1.5–4)
LYMPHOCYTES RELATIVE PERCENT: 31 % (ref 20–42)
MCH RBC QN AUTO: 27 PG (ref 26–35)
MCHC RBC AUTO-ENTMCNC: 30.1 G/DL (ref 32–34.5)
MCV RBC AUTO: 89.7 FL (ref 80–99.9)
METHADONE UR QL: NEGATIVE
MONOCYTES NFR BLD: 0.64 K/UL (ref 0.1–0.95)
MONOCYTES NFR BLD: 10 % (ref 2–12)
NEUTROPHILS NFR BLD: 55 % (ref 43–80)
NEUTS SEG NFR BLD: 3.46 K/UL (ref 1.8–7.3)
NITRITE UR QL STRIP: NEGATIVE
OPIATES UR QL SCN: POSITIVE
OXYCODONE UR QL SCN: POSITIVE
PCP UR QL SCN: NEGATIVE
PH UR STRIP: 6.5 [PH] (ref 5–9)
PLATELET # BLD AUTO: 274 K/UL (ref 130–450)
PMV BLD AUTO: 9.1 FL (ref 7–12)
POTASSIUM SERPL-SCNC: 4.8 MMOL/L (ref 3.5–5)
PROT SERPL-MCNC: 7.7 G/DL (ref 6.4–8.3)
PROT UR STRIP-MCNC: ABNORMAL MG/DL
RBC # BLD AUTO: 4.19 M/UL (ref 3.5–5.5)
RBC #/AREA URNS HPF: ABNORMAL /HPF
SALICYLATES SERPL-MCNC: <0.3 MG/DL (ref 0–30)
SODIUM SERPL-SCNC: 142 MMOL/L (ref 132–146)
SP GR UR STRIP: 1.02 (ref 1–1.03)
TEST INFORMATION: ABNORMAL
TOXIC TRICYCLIC SC,BLOOD: NEGATIVE
TROPONIN I SERPL HS-MCNC: 21 NG/L (ref 0–9)
TROPONIN I SERPL HS-MCNC: 21 NG/L (ref 0–9)
UROBILINOGEN UR STRIP-ACNC: 0.2 EU/DL (ref 0–1)
WBC #/AREA URNS HPF: ABNORMAL /HPF
WBC OTHER # BLD: 6.2 K/UL (ref 4.5–11.5)

## 2024-08-18 PROCEDURE — 93010 ELECTROCARDIOGRAM REPORT: CPT | Performed by: INTERNAL MEDICINE

## 2024-08-18 PROCEDURE — 71045 X-RAY EXAM CHEST 1 VIEW: CPT

## 2024-08-18 PROCEDURE — G0480 DRUG TEST DEF 1-7 CLASSES: HCPCS

## 2024-08-18 PROCEDURE — 6360000002 HC RX W HCPCS: Performed by: STUDENT IN AN ORGANIZED HEALTH CARE EDUCATION/TRAINING PROGRAM

## 2024-08-18 PROCEDURE — 87040 BLOOD CULTURE FOR BACTERIA: CPT

## 2024-08-18 PROCEDURE — 80179 DRUG ASSAY SALICYLATE: CPT

## 2024-08-18 PROCEDURE — 36415 COLL VENOUS BLD VENIPUNCTURE: CPT

## 2024-08-18 PROCEDURE — 86403 PARTICLE AGGLUT ANTBDY SCRN: CPT

## 2024-08-18 PROCEDURE — 74176 CT ABD & PELVIS W/O CONTRAST: CPT

## 2024-08-18 PROCEDURE — 72131 CT LUMBAR SPINE W/O DYE: CPT

## 2024-08-18 PROCEDURE — 80053 COMPREHEN METABOLIC PANEL: CPT

## 2024-08-18 PROCEDURE — 87154 CUL TYP ID BLD PTHGN 6+ TRGT: CPT

## 2024-08-18 PROCEDURE — 6370000000 HC RX 637 (ALT 250 FOR IP): Performed by: INTERNAL MEDICINE

## 2024-08-18 PROCEDURE — 70450 CT HEAD/BRAIN W/O DYE: CPT

## 2024-08-18 PROCEDURE — 2060000000 HC ICU INTERMEDIATE R&B

## 2024-08-18 PROCEDURE — 84484 ASSAY OF TROPONIN QUANT: CPT

## 2024-08-18 PROCEDURE — 93005 ELECTROCARDIOGRAM TRACING: CPT | Performed by: STUDENT IN AN ORGANIZED HEALTH CARE EDUCATION/TRAINING PROGRAM

## 2024-08-18 PROCEDURE — 99285 EMERGENCY DEPT VISIT HI MDM: CPT

## 2024-08-18 PROCEDURE — 80143 DRUG ASSAY ACETAMINOPHEN: CPT

## 2024-08-18 PROCEDURE — 85025 COMPLETE CBC W/AUTO DIFF WBC: CPT

## 2024-08-18 PROCEDURE — 81001 URINALYSIS AUTO W/SCOPE: CPT

## 2024-08-18 PROCEDURE — 83605 ASSAY OF LACTIC ACID: CPT

## 2024-08-18 PROCEDURE — 2580000003 HC RX 258: Performed by: STUDENT IN AN ORGANIZED HEALTH CARE EDUCATION/TRAINING PROGRAM

## 2024-08-18 PROCEDURE — 2500000003 HC RX 250 WO HCPCS: Performed by: STUDENT IN AN ORGANIZED HEALTH CARE EDUCATION/TRAINING PROGRAM

## 2024-08-18 PROCEDURE — 80307 DRUG TEST PRSMV CHEM ANLYZR: CPT

## 2024-08-18 RX ORDER — MECLIZINE HCL 12.5 MG 12.5 MG/1
12.5 TABLET ORAL EVERY 8 HOURS PRN
COMMUNITY

## 2024-08-18 RX ORDER — ACETAMINOPHEN 325 MG/1
650 TABLET ORAL EVERY 4 HOURS PRN
COMMUNITY

## 2024-08-18 RX ORDER — POTASSIUM CHLORIDE 1500 MG/1
40 TABLET, EXTENDED RELEASE ORAL PRN
Status: DISCONTINUED | OUTPATIENT
Start: 2024-08-18 | End: 2024-08-23 | Stop reason: HOSPADM

## 2024-08-18 RX ORDER — ENOXAPARIN SODIUM 100 MG/ML
40 INJECTION SUBCUTANEOUS DAILY
Status: DISCONTINUED | OUTPATIENT
Start: 2024-08-19 | End: 2024-08-23 | Stop reason: HOSPADM

## 2024-08-18 RX ORDER — MAGNESIUM SULFATE IN WATER 40 MG/ML
2000 INJECTION, SOLUTION INTRAVENOUS PRN
Status: DISCONTINUED | OUTPATIENT
Start: 2024-08-18 | End: 2024-08-23 | Stop reason: HOSPADM

## 2024-08-18 RX ORDER — PROCHLORPERAZINE MALEATE 5 MG
5 TABLET ORAL EVERY 6 HOURS PRN
Status: DISCONTINUED | OUTPATIENT
Start: 2024-08-18 | End: 2024-08-23 | Stop reason: HOSPADM

## 2024-08-18 RX ORDER — POTASSIUM CHLORIDE 7.45 MG/ML
10 INJECTION INTRAVENOUS PRN
Status: DISCONTINUED | OUTPATIENT
Start: 2024-08-18 | End: 2024-08-23 | Stop reason: HOSPADM

## 2024-08-18 RX ORDER — PROCHLORPERAZINE MALEATE 5 MG
5 TABLET ORAL EVERY 6 HOURS PRN
COMMUNITY

## 2024-08-18 RX ORDER — POLYETHYLENE GLYCOL 3350 17 G/17G
17 POWDER, FOR SOLUTION ORAL DAILY PRN
Status: DISCONTINUED | OUTPATIENT
Start: 2024-08-18 | End: 2024-08-23 | Stop reason: HOSPADM

## 2024-08-18 RX ORDER — POLYVINYL ALCOHOL 14 MG/ML
1 SOLUTION/ DROPS OPHTHALMIC EVERY 8 HOURS PRN
COMMUNITY

## 2024-08-18 RX ORDER — PANTOPRAZOLE SODIUM 40 MG/1
40 TABLET, DELAYED RELEASE ORAL DAILY PRN
COMMUNITY

## 2024-08-18 RX ORDER — GABAPENTIN 300 MG/1
300 CAPSULE ORAL 3 TIMES DAILY
COMMUNITY

## 2024-08-18 RX ORDER — MECLIZINE HCL 12.5 MG 12.5 MG/1
12.5 TABLET ORAL 3 TIMES DAILY
Status: DISCONTINUED | OUTPATIENT
Start: 2024-08-18 | End: 2024-08-23 | Stop reason: HOSPADM

## 2024-08-18 RX ORDER — DULOXETIN HYDROCHLORIDE 30 MG/1
30 CAPSULE, DELAYED RELEASE ORAL
Status: DISCONTINUED | OUTPATIENT
Start: 2024-08-18 | End: 2024-08-23 | Stop reason: HOSPADM

## 2024-08-18 RX ORDER — DOCUSATE SODIUM 100 MG/1
100 CAPSULE, LIQUID FILLED ORAL 2 TIMES DAILY
Status: DISCONTINUED | OUTPATIENT
Start: 2024-08-18 | End: 2024-08-23 | Stop reason: HOSPADM

## 2024-08-18 RX ORDER — ONDANSETRON 2 MG/ML
4 INJECTION INTRAMUSCULAR; INTRAVENOUS EVERY 6 HOURS PRN
Status: DISCONTINUED | OUTPATIENT
Start: 2024-08-18 | End: 2024-08-23 | Stop reason: HOSPADM

## 2024-08-18 RX ORDER — ACETAMINOPHEN 325 MG/1
650 TABLET ORAL EVERY 6 HOURS PRN
Status: DISCONTINUED | OUTPATIENT
Start: 2024-08-18 | End: 2024-08-18 | Stop reason: SDUPTHER

## 2024-08-18 RX ORDER — 0.9 % SODIUM CHLORIDE 0.9 %
1000 INTRAVENOUS SOLUTION INTRAVENOUS ONCE
Status: COMPLETED | OUTPATIENT
Start: 2024-08-18 | End: 2024-08-18

## 2024-08-18 RX ORDER — OXYBUTYNIN CHLORIDE 5 MG/1
5 TABLET ORAL 3 TIMES DAILY
Status: DISCONTINUED | OUTPATIENT
Start: 2024-08-18 | End: 2024-08-23 | Stop reason: HOSPADM

## 2024-08-18 RX ORDER — MECLIZINE HCL 12.5 MG 12.5 MG/1
12.5 TABLET ORAL EVERY 8 HOURS PRN
Status: DISCONTINUED | OUTPATIENT
Start: 2024-08-18 | End: 2024-08-23 | Stop reason: HOSPADM

## 2024-08-18 RX ORDER — ACETAMINOPHEN 650 MG/1
650 SUPPOSITORY RECTAL EVERY 6 HOURS PRN
Status: DISCONTINUED | OUTPATIENT
Start: 2024-08-18 | End: 2024-08-18 | Stop reason: SDUPTHER

## 2024-08-18 RX ORDER — GABAPENTIN 300 MG/1
300 CAPSULE ORAL 3 TIMES DAILY
Status: DISCONTINUED | OUTPATIENT
Start: 2024-08-18 | End: 2024-08-23 | Stop reason: HOSPADM

## 2024-08-18 RX ORDER — ONDANSETRON 4 MG/1
4 TABLET, ORALLY DISINTEGRATING ORAL EVERY 8 HOURS PRN
Status: DISCONTINUED | OUTPATIENT
Start: 2024-08-18 | End: 2024-08-23 | Stop reason: HOSPADM

## 2024-08-18 RX ORDER — VITAMIN B COMPLEX
1000 TABLET ORAL 2 TIMES DAILY
Status: DISCONTINUED | OUTPATIENT
Start: 2024-08-18 | End: 2024-08-23 | Stop reason: HOSPADM

## 2024-08-18 RX ORDER — FOLIC ACID 1 MG/1
1000 TABLET ORAL DAILY
Status: DISCONTINUED | OUTPATIENT
Start: 2024-08-19 | End: 2024-08-23 | Stop reason: HOSPADM

## 2024-08-18 RX ORDER — SODIUM CHLORIDE 0.9 % (FLUSH) 0.9 %
5-40 SYRINGE (ML) INJECTION EVERY 12 HOURS SCHEDULED
Status: DISCONTINUED | OUTPATIENT
Start: 2024-08-18 | End: 2024-08-23 | Stop reason: HOSPADM

## 2024-08-18 RX ORDER — SODIUM CHLORIDE 9 MG/ML
INJECTION, SOLUTION INTRAVENOUS PRN
Status: DISCONTINUED | OUTPATIENT
Start: 2024-08-18 | End: 2024-08-23 | Stop reason: HOSPADM

## 2024-08-18 RX ORDER — MEMANTINE HYDROCHLORIDE 10 MG/1
10 TABLET ORAL 2 TIMES DAILY
Status: DISCONTINUED | OUTPATIENT
Start: 2024-08-18 | End: 2024-08-23 | Stop reason: HOSPADM

## 2024-08-18 RX ORDER — POLYETHYLENE GLYCOL 3350 17 G/17G
17 POWDER, FOR SOLUTION ORAL DAILY
Status: DISCONTINUED | OUTPATIENT
Start: 2024-08-19 | End: 2024-08-23 | Stop reason: HOSPADM

## 2024-08-18 RX ORDER — FLUOXETINE 40 MG/1
40 CAPSULE ORAL DAILY
COMMUNITY

## 2024-08-18 RX ORDER — MAGNESIUM CARB/ALUMINUM HYDROX 105-160MG
300 TABLET,CHEWABLE ORAL DAILY PRN
COMMUNITY

## 2024-08-18 RX ORDER — PANTOPRAZOLE SODIUM 40 MG/1
40 TABLET, DELAYED RELEASE ORAL DAILY PRN
Status: DISCONTINUED | OUTPATIENT
Start: 2024-08-18 | End: 2024-08-23 | Stop reason: HOSPADM

## 2024-08-18 RX ORDER — ACETAMINOPHEN 325 MG/1
650 TABLET ORAL EVERY 4 HOURS PRN
Status: DISCONTINUED | OUTPATIENT
Start: 2024-08-18 | End: 2024-08-20

## 2024-08-18 RX ORDER — MECLIZINE HCL 12.5 MG 12.5 MG/1
12.5 TABLET ORAL 3 TIMES DAILY
COMMUNITY

## 2024-08-18 RX ORDER — POLYVINYL ALCOHOL 14 MG/ML
1 SOLUTION/ DROPS OPHTHALMIC 3 TIMES DAILY
COMMUNITY

## 2024-08-18 RX ORDER — MENTHOL 40 MG/ML
1 GEL TOPICAL EVERY 8 HOURS PRN
COMMUNITY

## 2024-08-18 RX ORDER — TEMAZEPAM 15 MG/1
15 CAPSULE ORAL NIGHTLY
Status: DISCONTINUED | OUTPATIENT
Start: 2024-08-18 | End: 2024-08-23 | Stop reason: HOSPADM

## 2024-08-18 RX ORDER — MENTHOL 1.4 %
1 ADHESIVE PATCH, MEDICATED TOPICAL EVERY 8 HOURS PRN
Status: DISCONTINUED | OUTPATIENT
Start: 2024-08-19 | End: 2024-08-23 | Stop reason: HOSPADM

## 2024-08-18 RX ORDER — SENNOSIDES 8.6 MG
1300 CAPSULE ORAL EVERY 8 HOURS PRN
Status: DISCONTINUED | OUTPATIENT
Start: 2024-08-18 | End: 2024-08-18 | Stop reason: RX

## 2024-08-18 RX ORDER — SODIUM CHLORIDE 0.9 % (FLUSH) 0.9 %
5-40 SYRINGE (ML) INJECTION PRN
Status: DISCONTINUED | OUTPATIENT
Start: 2024-08-18 | End: 2024-08-23 | Stop reason: HOSPADM

## 2024-08-18 RX ORDER — TRAMADOL HYDROCHLORIDE 50 MG/1
50 TABLET ORAL EVERY 4 HOURS PRN
Status: DISCONTINUED | OUTPATIENT
Start: 2024-08-18 | End: 2024-08-23 | Stop reason: HOSPADM

## 2024-08-18 RX ADMIN — OXYBUTYNIN CHLORIDE 5 MG: 5 TABLET ORAL at 23:19

## 2024-08-18 RX ADMIN — DOXYCYCLINE 100 MG: 100 INJECTION, POWDER, LYOPHILIZED, FOR SOLUTION INTRAVENOUS at 22:05

## 2024-08-18 RX ADMIN — GABAPENTIN 300 MG: 300 CAPSULE ORAL at 23:19

## 2024-08-18 RX ADMIN — MECLIZINE HYDROCHLORIDE 12.5 MG: 12.5 TABLET ORAL at 23:19

## 2024-08-18 RX ADMIN — SODIUM CHLORIDE 1000 ML: 9 INJECTION, SOLUTION INTRAVENOUS at 16:39

## 2024-08-18 RX ADMIN — MEMANTINE 10 MG: 10 TABLET ORAL at 23:19

## 2024-08-18 RX ADMIN — TEMAZEPAM 15 MG: 15 CAPSULE ORAL at 23:18

## 2024-08-18 RX ADMIN — ACETAMINOPHEN 650 MG: 325 TABLET ORAL at 23:18

## 2024-08-18 RX ADMIN — CEFTRIAXONE SODIUM 2000 MG: 2 INJECTION, POWDER, FOR SOLUTION INTRAMUSCULAR; INTRAVENOUS at 22:01

## 2024-08-18 ASSESSMENT — PAIN DESCRIPTION - DESCRIPTORS
DESCRIPTORS: ACHING;SHARP
DESCRIPTORS: ACHING;SHARP

## 2024-08-18 ASSESSMENT — PAIN DESCRIPTION - FREQUENCY: FREQUENCY: CONTINUOUS

## 2024-08-18 ASSESSMENT — PAIN DESCRIPTION - ONSET: ONSET: ON-GOING

## 2024-08-18 ASSESSMENT — PAIN SCALES - GENERAL
PAINLEVEL_OUTOF10: 5
PAINLEVEL_OUTOF10: 9

## 2024-08-18 ASSESSMENT — PAIN DESCRIPTION - LOCATION
LOCATION: BACK
LOCATION: BACK

## 2024-08-18 ASSESSMENT — PAIN DESCRIPTION - PAIN TYPE: TYPE: CHRONIC PAIN

## 2024-08-18 ASSESSMENT — PAIN - FUNCTIONAL ASSESSMENT
PAIN_FUNCTIONAL_ASSESSMENT: PREVENTS OR INTERFERES WITH ALL ACTIVE AND SOME PASSIVE ACTIVITIES
PAIN_FUNCTIONAL_ASSESSMENT: PREVENTS OR INTERFERES SOME ACTIVE ACTIVITIES AND ADLS

## 2024-08-18 NOTE — ED PROVIDER NOTES
Charity Simpson is a 84-year-old female present emergency department with altered mental status.  Patient was sent in for confusion.  Patient also reported that she is having lower back pain.  Patient does have a history of chronic lower back pain patient has pain in her back rating down her right leg.  Patient has not had falls.  Patient was afebrile.  Patient was sent in because of confusion today she was giving confused responses and EMS brought patient in for evaluation.    The history is provided by the patient, medical records and the EMS personnel.        Review of Systems   Constitutional:  Negative for chills, diaphoresis, fatigue and fever.   Eyes:  Negative for photophobia and visual disturbance.   Respiratory:  Negative for cough, chest tightness and shortness of breath.    Cardiovascular:  Negative for chest pain, palpitations and leg swelling.   Gastrointestinal:  Negative for abdominal distention, abdominal pain, diarrhea, nausea and vomiting.   Genitourinary:  Negative for dysuria.   Musculoskeletal:  Positive for back pain. Negative for neck pain and neck stiffness.   Skin:  Negative for pallor and rash.   Neurological:  Negative for headaches.   Psychiatric/Behavioral:  Positive for confusion.         Physical Exam  Vitals and nursing note reviewed.   Constitutional:       General: She is not in acute distress.  HENT:      Head: Normocephalic and atraumatic.   Eyes:      General: No scleral icterus.     Conjunctiva/sclera: Conjunctivae normal.      Pupils: Pupils are equal, round, and reactive to light.   Cardiovascular:      Rate and Rhythm: Normal rate and regular rhythm.      Comments: DP PT pulses intact bilaterally   Pulmonary:      Effort: Pulmonary effort is normal.      Breath sounds: Normal breath sounds.   Abdominal:      General: Bowel sounds are normal. There is no distension.      Palpations: Abdomen is soft.      Tenderness: There is no abdominal tenderness. There is no guarding  NEGATIVE NEGATIVE    Leukocyte Esterase, Urine NEGATIVE NEGATIVE    WBC, UA 0 TO 5 0 TO 5 /HPF    RBC, UA 0 TO 2 0 TO 2 /HPF   Lactate, Sepsis   Result Value Ref Range    Lactic Acid, Sepsis 2.2 (H) 0.5 - 1.9 mmol/L   Lactate, Sepsis   Result Value Ref Range    Lactic Acid, Sepsis 1.6 0.5 - 1.9 mmol/L   Urine Drug Screen   Result Value Ref Range    Amphetamine Screen, Ur NEGATIVE NEGATIVE    Barbiturate Screen, Ur NEGATIVE NEGATIVE    Benzodiazepine Screen, Urine POSITIVE (A) NEGATIVE    Cocaine Metabolite, Urine NEGATIVE NEGATIVE    Methadone Screen, Urine NEGATIVE NEGATIVE    Opiates, Urine POSITIVE (A) NEGATIVE    Phencyclidine, Urine NEGATIVE NEGATIVE    Cannabinoid Scrn, Ur POSITIVE (A) NEGATIVE    Oxycodone Screen, Ur POSITIVE (A) NEGATIVE    Fentanyl, Ur NEGATIVE NEGATIVE    Buprenorphine Urine NEGATIVE NEGATIVE    Test Information       These drug screen results are for medical purposes only and should not be considered definitive or confirmed.   Serum Drug Screen   Result Value Ref Range    Acetaminophen Level <5 (L) 10.0 - 30.0 ug/mL    Ethanol Lvl <10 <10 mg/dL    Salicylate Lvl <0.3 0.0 - 30.0 mg/dL    Toxic Tricyclic Sc,Blood NEGATIVE NEGATIVE   Troponin   Result Value Ref Range    Troponin, High Sensitivity 21 (H) 0 - 9 ng/L   CBC with Auto Differential   Result Value Ref Range    WBC 6.5 4.5 - 11.5 k/uL    RBC 3.77 3.50 - 5.50 m/uL    Hemoglobin 10.1 (L) 11.5 - 15.5 g/dL    Hematocrit 34.2 34.0 - 48.0 %    MCV 90.7 80.0 - 99.9 fL    MCH 26.8 26.0 - 35.0 pg    MCHC 29.5 (L) 32.0 - 34.5 g/dL    RDW 16.9 (H) 11.5 - 15.0 %    Platelets 257 130 - 450 k/uL    MPV 9.3 7.0 - 12.0 fL    Neutrophils % 58 43.0 - 80.0 %    Lymphocytes % 28 20.0 - 42.0 %    Monocytes % 11 2.0 - 12.0 %    Eosinophils % 2 0 - 6 %    Basophils % 1 0.0 - 2.0 %    Immature Granulocytes % 0 0.0 - 5.0 %    Neutrophils Absolute 3.77 1.80 - 7.30 k/uL    Lymphocytes Absolute 1.79 1.50 - 4.00 k/uL    Monocytes Absolute 0.70 0.10 - 0.95 k/uL  subsegmental atelectasis   in the left lower lobe.  Cannot exclude underline associated superimposed   pneumonic process in the left base.                 ------------------------- NURSING NOTES AND VITALS REVIEWED ---------------------------  Date / Time Roomed:  8/18/2024  2:05 PM  ED Bed Assignment:  0614/0614-02    The nursing notes within the ED encounter and vital signs as below have been reviewed.     Patient Vitals for the past 24 hrs:   BP Temp Temp src Pulse Resp SpO2 Height Weight   08/19/24 1925 (!) 142/68 97 °F (36.1 °C) Oral 92 18 -- -- --   08/19/24 1645 -- -- -- -- 18 -- -- --   08/19/24 1615 -- -- -- -- (!) 1 -- -- --   08/19/24 1445 132/76 98 °F (36.7 °C) Oral 90 18 95 % -- --   08/19/24 1425 -- -- -- -- -- -- 1.702 m (5' 7.01\") --   08/19/24 1215 128/78 98 °F (36.7 °C) Axillary 90 18 94 % -- --   08/19/24 1109 -- -- -- -- 18 -- -- --   08/19/24 1015 138/66 98.3 °F (36.8 °C) Axillary 95 18 93 % -- --   08/19/24 0600 -- -- -- -- -- -- -- 61.7 kg (136 lb)   08/19/24 0535 -- -- -- -- 18 -- -- --   08/19/24 0505 -- -- -- -- 18 -- -- --   08/19/24 0345 134/64 97.8 °F (36.6 °C) Oral 92 18 92 % -- --   08/19/24 0105 (!) 123/59 98.1 °F (36.7 °C) Oral (!) 104 18 -- -- --   08/18/24 2157 -- -- -- 98 -- -- -- --       Oxygen Saturation Interpretation: Normal    ------------------------------------------ PROGRESS NOTES ------------------------------------------  Re-evaluation(s):  Time: 4p  Patient’s symptoms show no change  Repeat physical examination is not changed    Counseling:  I have spoken with the patient and discussed today’s results, in addition to providing specific details for the plan of care and counseling regarding the diagnosis and prognosis.  Their questions are answered at this time and they are agreeable with the plan of admission.    --------------------------------- ADDITIONAL PROVIDER NOTES ---------------------------------  Consultations:  Spoke with Dr. Escamilla.  Discussed case.  They

## 2024-08-19 ENCOUNTER — APPOINTMENT (OUTPATIENT)
Dept: CT IMAGING | Age: 84
End: 2024-08-19
Payer: MEDICARE

## 2024-08-19 ENCOUNTER — APPOINTMENT (OUTPATIENT)
Dept: MRI IMAGING | Age: 84
End: 2024-08-19
Payer: MEDICARE

## 2024-08-19 PROBLEM — E87.20 LACTIC ACIDOSIS: Status: ACTIVE | Noted: 2024-08-19

## 2024-08-19 PROBLEM — E83.52 HYPERCALCEMIA: Status: ACTIVE | Noted: 2024-08-19

## 2024-08-19 PROBLEM — F03.90 DEMENTIA (HCC): Status: ACTIVE | Noted: 2024-08-19

## 2024-08-19 PROBLEM — J18.9 PNEUMONIA: Status: ACTIVE | Noted: 2024-08-19

## 2024-08-19 LAB
BASOPHILS # BLD: 0.05 K/UL (ref 0–0.2)
BASOPHILS NFR BLD: 1 % (ref 0–2)
EOSINOPHIL # BLD: 0.13 K/UL (ref 0.05–0.5)
EOSINOPHILS RELATIVE PERCENT: 2 % (ref 0–6)
ERYTHROCYTE [DISTWIDTH] IN BLOOD BY AUTOMATED COUNT: 16.9 % (ref 11.5–15)
HCT VFR BLD AUTO: 34.2 % (ref 34–48)
HGB BLD-MCNC: 10.1 G/DL (ref 11.5–15.5)
IMM GRANULOCYTES # BLD AUTO: <0.03 K/UL (ref 0–0.58)
IMM GRANULOCYTES NFR BLD: 0 % (ref 0–5)
LYMPHOCYTES NFR BLD: 1.79 K/UL (ref 1.5–4)
LYMPHOCYTES RELATIVE PERCENT: 28 % (ref 20–42)
MCH RBC QN AUTO: 26.8 PG (ref 26–35)
MCHC RBC AUTO-ENTMCNC: 29.5 G/DL (ref 32–34.5)
MCV RBC AUTO: 90.7 FL (ref 80–99.9)
MONOCYTES NFR BLD: 0.7 K/UL (ref 0.1–0.95)
MONOCYTES NFR BLD: 11 % (ref 2–12)
NEUTROPHILS NFR BLD: 58 % (ref 43–80)
NEUTS SEG NFR BLD: 3.77 K/UL (ref 1.8–7.3)
PLATELET # BLD AUTO: 257 K/UL (ref 130–450)
PMV BLD AUTO: 9.3 FL (ref 7–12)
RBC # BLD AUTO: 3.77 M/UL (ref 3.5–5.5)
WBC OTHER # BLD: 6.5 K/UL (ref 4.5–11.5)

## 2024-08-19 PROCEDURE — 97165 OT EVAL LOW COMPLEX 30 MIN: CPT

## 2024-08-19 PROCEDURE — 85025 COMPLETE CBC W/AUTO DIFF WBC: CPT

## 2024-08-19 PROCEDURE — 97530 THERAPEUTIC ACTIVITIES: CPT

## 2024-08-19 PROCEDURE — 70551 MRI BRAIN STEM W/O DYE: CPT

## 2024-08-19 PROCEDURE — 2060000000 HC ICU INTERMEDIATE R&B

## 2024-08-19 PROCEDURE — 97161 PT EVAL LOW COMPLEX 20 MIN: CPT

## 2024-08-19 PROCEDURE — 71250 CT THORAX DX C-: CPT

## 2024-08-19 PROCEDURE — 36415 COLL VENOUS BLD VENIPUNCTURE: CPT

## 2024-08-19 PROCEDURE — 6360000002 HC RX W HCPCS: Performed by: INTERNAL MEDICINE

## 2024-08-19 PROCEDURE — 2500000003 HC RX 250 WO HCPCS: Performed by: INTERNAL MEDICINE

## 2024-08-19 PROCEDURE — 2580000003 HC RX 258: Performed by: INTERNAL MEDICINE

## 2024-08-19 PROCEDURE — 6370000000 HC RX 637 (ALT 250 FOR IP): Performed by: INTERNAL MEDICINE

## 2024-08-19 PROCEDURE — 92610 EVALUATE SWALLOWING FUNCTION: CPT | Performed by: SPEECH-LANGUAGE PATHOLOGIST

## 2024-08-19 RX ORDER — ACETAMINOPHEN 325 MG/1
650 TABLET ORAL EVERY 6 HOURS PRN
Status: DISCONTINUED | OUTPATIENT
Start: 2024-08-19 | End: 2024-08-19 | Stop reason: SDUPTHER

## 2024-08-19 RX ADMIN — POLYVINYL ALCOHOL, POVIDONE 1 DROP: 14; 6 SOLUTION/ DROPS OPHTHALMIC at 09:08

## 2024-08-19 RX ADMIN — OXYBUTYNIN CHLORIDE 5 MG: 5 TABLET ORAL at 14:00

## 2024-08-19 RX ADMIN — DOXYCYCLINE 100 MG: 100 INJECTION, POWDER, LYOPHILIZED, FOR SOLUTION INTRAVENOUS at 12:32

## 2024-08-19 RX ADMIN — TRAMADOL HYDROCHLORIDE 50 MG: 50 TABLET ORAL at 16:15

## 2024-08-19 RX ADMIN — MECLIZINE HYDROCHLORIDE 12.5 MG: 12.5 TABLET ORAL at 14:00

## 2024-08-19 RX ADMIN — DOCUSATE SODIUM 100 MG: 100 CAPSULE, LIQUID FILLED ORAL at 21:00

## 2024-08-19 RX ADMIN — MEMANTINE 10 MG: 10 TABLET ORAL at 10:39

## 2024-08-19 RX ADMIN — OXYBUTYNIN CHLORIDE 5 MG: 5 TABLET ORAL at 21:00

## 2024-08-19 RX ADMIN — MECLIZINE HYDROCHLORIDE 12.5 MG: 12.5 TABLET ORAL at 21:00

## 2024-08-19 RX ADMIN — TRAMADOL HYDROCHLORIDE 50 MG: 50 TABLET ORAL at 10:39

## 2024-08-19 RX ADMIN — GABAPENTIN 300 MG: 300 CAPSULE ORAL at 21:00

## 2024-08-19 RX ADMIN — TRAMADOL HYDROCHLORIDE 50 MG: 50 TABLET ORAL at 05:05

## 2024-08-19 RX ADMIN — ACETAMINOPHEN 650 MG: 325 TABLET ORAL at 05:05

## 2024-08-19 RX ADMIN — MECLIZINE HYDROCHLORIDE 12.5 MG: 12.5 TABLET ORAL at 10:39

## 2024-08-19 RX ADMIN — POLYETHYLENE GLYCOL 3350 17 G: 17 POWDER, FOR SOLUTION ORAL at 10:40

## 2024-08-19 RX ADMIN — WATER 2000 MG: 1 INJECTION INTRAMUSCULAR; INTRAVENOUS; SUBCUTANEOUS at 21:00

## 2024-08-19 RX ADMIN — DOXYCYCLINE 100 MG: 100 INJECTION, POWDER, LYOPHILIZED, FOR SOLUTION INTRAVENOUS at 21:01

## 2024-08-19 RX ADMIN — POLYVINYL ALCOHOL, POVIDONE 1 DROP: 14; 6 SOLUTION/ DROPS OPHTHALMIC at 21:01

## 2024-08-19 RX ADMIN — MEMANTINE 10 MG: 10 TABLET ORAL at 21:00

## 2024-08-19 RX ADMIN — DULOXETINE 30 MG: 30 CAPSULE, DELAYED RELEASE ORAL at 13:09

## 2024-08-19 RX ADMIN — GABAPENTIN 300 MG: 300 CAPSULE ORAL at 14:00

## 2024-08-19 RX ADMIN — FLUOXETINE HYDROCHLORIDE 40 MG: 20 CAPSULE ORAL at 10:38

## 2024-08-19 RX ADMIN — GABAPENTIN 300 MG: 300 CAPSULE ORAL at 10:38

## 2024-08-19 RX ADMIN — SODIUM CHLORIDE, PRESERVATIVE FREE 10 ML: 5 INJECTION INTRAVENOUS at 21:01

## 2024-08-19 RX ADMIN — ENOXAPARIN SODIUM 40 MG: 100 INJECTION SUBCUTANEOUS at 10:38

## 2024-08-19 RX ADMIN — OXYBUTYNIN CHLORIDE 5 MG: 5 TABLET ORAL at 10:39

## 2024-08-19 RX ADMIN — TEMAZEPAM 15 MG: 15 CAPSULE ORAL at 21:00

## 2024-08-19 RX ADMIN — DOCUSATE SODIUM 100 MG: 100 CAPSULE, LIQUID FILLED ORAL at 10:38

## 2024-08-19 RX ADMIN — Medication 1000 UNITS: at 09:00

## 2024-08-19 RX ADMIN — SODIUM CHLORIDE, PRESERVATIVE FREE 10 ML: 5 INJECTION INTRAVENOUS at 10:40

## 2024-08-19 RX ADMIN — POLYVINYL ALCOHOL, POVIDONE 1 DROP: 14; 6 SOLUTION/ DROPS OPHTHALMIC at 13:11

## 2024-08-19 RX ADMIN — Medication 1000 UNITS: at 21:00

## 2024-08-19 RX ADMIN — FOLIC ACID 1000 MCG: 1 TABLET ORAL at 10:38

## 2024-08-19 ASSESSMENT — PAIN SCALES - GENERAL
PAINLEVEL_OUTOF10: 6
PAINLEVEL_OUTOF10: 5
PAINLEVEL_OUTOF10: 0
PAINLEVEL_OUTOF10: 6
PAINLEVEL_OUTOF10: 0
PAINLEVEL_OUTOF10: 6
PAINLEVEL_OUTOF10: 9

## 2024-08-19 ASSESSMENT — PAIN DESCRIPTION - LOCATION
LOCATION: BACK

## 2024-08-19 ASSESSMENT — PAIN DESCRIPTION - DESCRIPTORS
DESCRIPTORS: ACHING;SPASM
DESCRIPTORS: SPASM;ACHING
DESCRIPTORS: ACHING
DESCRIPTORS: ACHING;SPASM

## 2024-08-19 ASSESSMENT — PAIN DESCRIPTION - PAIN TYPE: TYPE: CHRONIC PAIN

## 2024-08-19 ASSESSMENT — PAIN - FUNCTIONAL ASSESSMENT
PAIN_FUNCTIONAL_ASSESSMENT: PREVENTS OR INTERFERES SOME ACTIVE ACTIVITIES AND ADLS
PAIN_FUNCTIONAL_ASSESSMENT: PREVENTS OR INTERFERES WITH MANY ACTIVE NOT PASSIVE ACTIVITIES
PAIN_FUNCTIONAL_ASSESSMENT: PREVENTS OR INTERFERES SOME ACTIVE ACTIVITIES AND ADLS

## 2024-08-19 ASSESSMENT — PAIN DESCRIPTION - ONSET: ONSET: ON-GOING

## 2024-08-19 ASSESSMENT — PAIN DESCRIPTION - FREQUENCY: FREQUENCY: CONTINUOUS

## 2024-08-19 ASSESSMENT — PAIN DESCRIPTION - ORIENTATION
ORIENTATION: MID
ORIENTATION: MID

## 2024-08-19 NOTE — ACP (ADVANCE CARE PLANNING)
Advance Care Planning   Healthcare Decision Maker:    Primary Decision Maker: Michelle Ramirez - [facility] - [phone]    Click here to complete Healthcare Decision Makers including selection of the Healthcare Decision Maker Relationship (ie \"Primary\").  Today we documented Decision Maker(s) consistent with Legal Next of Kin hierarchy.

## 2024-08-19 NOTE — PROGRESS NOTES
Primary Care Physician: George Escamilla MD   Admitting Physician:  George Escamilla MD  Admission date and time: 8/18/2024  2:05 PM    Room:  [address] [place] [address]  Admitting diagnosis: Altered mental status, unspecified altered mental status type [R41.82]  Pneumonia due to infectious organism, unspecified laterality, unspecified part of lung [J18.9]  AMS (altered mental status) [R41.82]  Altered mental status [R41.82]    Patient Name: Charity Simpson  MRN: [id number]    Date of Service: 8/19/2024     Subjective:  Charity is a 84 y.o. female who was seen and examined today,8/19/2024, at the bedside.  Patient continues to complain of severe low back pain radiating to both lower extremity right more than left    NO family present during my examination.      ROS:  General:   Denies chills, fatigue, fever, malaise, night sweats or weight loss     Psychological:   Denies anxiety, disorientation or hallucinations     ENT:    Denies epistaxis, headaches, vertigo or visual changes     Cardiovascular:   Denies any chest pain, irregular heartbeats, or palpitations. No paroxysmal nocturnal dyspnea.     Respiratory:   Denies shortness of breath, coughing, sputum production, hemoptysis, or wheezing.  No orthopnea.     Gastrointestinal:   Denies nausea, vomiting, diarrhea, or constipation.  Denies any abdominal pain.  Denies change in bowel habits or stools.       Genito-Urinary:    Denies any urgency, frequency, hematuria.  Voiding without difficulty.     Musculoskeletal:   Denies joint pain, joint stiffness, joint swelling or muscle pain; ++ low back pain     Neurology:    Denies any headache. No weakness or paresthesia; ++ confusion     Derm:    Denies any rashes, ulcers, or excoriations.  Denies bruising.      Extremities:   Denies any lower extremity swelling or edema.    Physical Exam:  No intake/output data recorded.  No intake or output data in the 24 hours ending 08/19/24 0722No intake/output data recorded.  Patient Vitals for  the past 96 hrs (Last 3 readings):   Weight   08/19/24 0600 61.7 kg (136 lb)   08/18/24 2045 61.7 kg (136 lb)     Vital Signs:   Blood pressure 134/64, pulse 92, temperature 97.8 °F (36.6 °C), temperature source Oral, resp. rate 18, height 1.702 m (5' 7\"), weight 61.7 kg (136 lb), SpO2 92%.      CONSTITUTIONAL:    Awake, alert, cooperative, no apparent distress, and appears stated age     EYES:    PERRL, EOMI, sclera clear, conjunctiva normal     ENT:    Normocephalic, atraumatic, sinuses nontender on palpation. External ears without lesions. Oral pharynx with moist mucus membranes.  Tonsils without erythema or exudates.     NECK:    Supple, symmetrical, trachea midline, no adenopathy, thyroid symmetric, not enlarged and no tenderness, skin normal, no bruits, no JVD     HEMATOLOGIC/LYMPHATICS:    No cervical lymphadenopathy and no supraclavicular lymphadenopathy     LUNGS:    Symmetric. No increased work of breathing, good air exchange, clear to auscultation bilaterally, no wheezes, rhonchi, or rales,      CARDIOVASCULAR:    Normal apical impulse, regular rate and rhythm, normal S1 and S2, no S3 or S4, and no murmur noted     ABDOMEN:    No scars, normal bowel sounds, soft, non-distended, non-tender, no masses palpated, no hepatosplenomegaly, no rebound or guarding elicited on palpation      MUSCULOSKELETAL:    There is no redness, warmth, or swelling of the joints.  Full range of motion noted.  Motor strength is 5 out of 5 all extremities bilaterally.  Tone is normal.     NEUROLOGIC:    Awake, alert, oriented to name, place and time.  Cranial nerves II-XII are grossly intact.  Motor is 5 out of 5 bilaterally.       SKIN:    No bruising or bleeding.  No redness, warmth, or swelling     EXTREMITIES:    Peripheral pulses present.  No edema, cyanosis, or swelling.       Medication:  Scheduled Meds:   cefTRIAXone (ROCEPHIN) IV  2,000 mg IntraVENous Once    doxycycline (VIBRAMYCIN) IV  100 mg IntraVENous Once    folic     BILITOT 0.7 08/18/2024 03:02 PM     Calcium:    Lab Results   Component Value Date/Time    CALCIUM 10.3 08/18/2024 03:02 PM       CT HEAD WO CONTRAST   Final Result      1.  No evidence of acute intracranial process.      2.  Findings of presumed mild small vessel ischemic deep white matter disease.         CT LUMBAR SPINE WO CONTRAST   Final Result   1. No acute fracture or malalignment of the lumbar spine.   2. Moderate left convexity upper lumbar scoliosis measuring at least 41   degrees.   3. Moderate degenerative disc disease at all lumbar levels. Mild-to-moderate   facet arthropathy throughout the lower lumbar spine.   4. Mild chronic circumferential canal stenosis at L4-5 with the thecal sac   measuring 7 mm in AP diameter.         CT ABDOMEN PELVIS WO CONTRAST Additional Contrast? None   Final Result      1.  Large hiatal hernia.      2.  Distended but otherwise unremarkable appearing urinary bladder.      3.  Otherwise no acute pathology noted.         XR CHEST PORTABLE   Final Result   Increased density in the left lower lung base relate with at least in mild   degree of left-sided pleural effusion and areas of subsegmental atelectasis   in the left lower lobe.  Cannot exclude underline associated superimposed   pneumonic process in the left base.         MRI BRAIN WO CONTRAST    (Results Pending)   CT CHEST WO CONTRAST    (Results Pending)            Assessment:    Pneumonia  Altered mental statis  Lactic acidosis  Hypercalcemia  Elevated troponin  Anemia  Chronic low back pain  Rheumatoid arthritis       Plan:     Admit to IMCU  Continue home medications  Start IV Rocephin  Start IV Vibramycin  Start IV fluids  DVT prophylaxis  Regular diet  Full code            More than 50% of my time was spent at the bedside counseling/coordinating care with the patient and/or family with face to face contact.  This time was spent reviewing notes and laboratory data as well as instructing and counseling the

## 2024-08-19 NOTE — PLAN OF CARE
Problem: Discharge Planning  Goal: Discharge to home or other facility with appropriate resources  Outcome: Progressing  Flowsheets (Taken 8/18/2024 1930)  Discharge to home or other facility with appropriate resources: Identify barriers to discharge with patient and caregiver     Problem: Pain  Goal: Verbalizes/displays adequate comfort level or baseline comfort level  Outcome: Progressing     Problem: ABCDS Injury Assessment  Goal: Absence of physical injury  Outcome: Progressing     Problem: Safety - Adult  Goal: Free from fall injury  Outcome: Progressing

## 2024-08-19 NOTE — PROGRESS NOTES
OCCUPATIONAL THERAPY INITIAL EVALUATION    [facility]  [address] [place]        Date:2024                                                  Patient Name: Charity Simpson    MRN: [id number]    : 1940    Room: [address] [place] [address]      Evaluating OT: Henrique Villatoro OTR/L; #[id number]     Referring Provider and Specific Provider Orders/Date:      24  OT eval and treat  Start:  24,   End:  24,   ONE TIME,   Standing Count:  1 Occurrences,   R         George Escamilla MD      Placement Recommendation: Subacute Rehab       Diagnosis:   1. Altered mental status, unspecified altered mental status type    2. Pneumonia due to infectious organism, unspecified laterality, unspecified part of lung         Surgery: None      Pertinent Medical History:       Past Medical History:   Diagnosis Date    Cardiac arrhythmia     Closed C4 fracture (HCC) 2021    Dementia (HCC)     Depression     Esophageal dysphagia     Giant cell arteritis (HCC)     Hyperlipidemia     Hypertension     Idiopathic progressive polyneuropathy     Migraine     Migraine without aura and without status migrainosus, not intractable     Nausea     Rheumatoid arthritis (HCC)     Supraspinatus tendinitis     Urinary incontinence          Past Surgical History:   Procedure Laterality Date    ABDOMEN SURGERY          Precautions:  Fall Risk, Whole body tremors, Up as Tolerated,      Assessment of current deficits:     [x] Functional mobility  [x]ADLs  [x] Strength               []Cognition    [x] Functional transfers   [x] IADLs         [] Safety Awareness   [x]Endurance    [] Fine Coordination              [x] Balance      [] Vision/perception   []Sensation     []Gross Motor Coordination  [] ROM  [] Delirium                   [] Motor Control     OT PLAN OF CARE   OT POC based on physician orders, patient diagnosis and results of clinical  27.31  ADL Inpatient CMS 0-100% Score: 74.7  ADL Inpatient CMS G-Code Modifier : CL     Functional Assessment:    Initial Eval Status  Date: 8/19/24 Treatment Status  Date: STGs = LTGs  Time frame: 10-14 days   Feeding Moderate Assist     Increase assistance at times due to increase tremors.  Supervision    Grooming Moderate Assist   Supervision    UB Dressing Moderate Assist   Supervision    LB Dressing Dependent   Maximal Assist    Bathing Maximal Assist  Minimal Assist    Toileting Dependent   Maximal Assist    Bed Mobility  Supine to sit: Maximal Assist   Sit to supine: Maximal Assist   Rolling:Maximal Assist    Supine to sit: Minimal Assist   Sit to supine: Minimal Assist   Rolling:Minimal Assist     Functional Transfers Not assessed  d/t pt overall debility, decreased activity tolerance, balance deficits, safety and fall risk.    Moderate Assist    Functional Mobility Not assessed  d/t pt overall debility, decreased activity tolerance, balance deficits, safety and fall risk.    W/c mobility    Balance Sitting:     Static: fair -    Dynamic: poor+  Standing: Not assessed  d/t pt overall debility, decreased activity tolerance, balance deficits, safety and fall risk.    Sitting:     Static: good     Dynamic: fair   Standing: fair  with least restrictive device.    Activity Tolerance Poor+  fair    Visual/  Perceptual Glasses: Yes                Hand Dominance: Right     AROM (PROM) Strength Additional Info:  Goal:   RUE  WFL 2-/5  Proximal   3+/5   Distal Fair-  and Fair- FMC/dexterity noted during ADL tasks   Improve overall RUE strength  for participation in functional tasks   LUE WFL 2-/5  Proximal  3+/5  Distal Fair-  and Fair- FMC/dexterity noted during ADL tasks   Improve overall LUE strength  for participation in functional tasks      Vitals:   HR with activity: 95 bpm     SpO2 with activity: 94%      Hearing: WFL   Sensation:  No c/o numbness or tingling  Tone: WFL   Edema: None    Comments:

## 2024-08-19 NOTE — PROGRESS NOTES
4 Eyes Skin Assessment     NAME:  Charity Simpson  YOB: 1940  MEDICAL RECORD NUMBER:  [id number]    The patient is being assessed for  Admission    I agree that at least one RN has performed a thorough Head to Toe Skin Assessment on the patient. ALL assessment sites listed below have been assessed.      Areas assessed by both nurses:    Head, Face, Ears, Shoulders, Back, Chest, Arms, Elbows, Hands, Sacrum. Buttock, Coccyx, Ischium, Legs. Feet and Heels, and Under Medical Devices         Does the Patient have a Wound? No noted wound(s)       Donny Prevention initiated by RN: Yes  Wound Care Orders initiated by RN: No    Pressure Injury (Stage 3,4, Unstageable, DTI, NWPT, and Complex wounds) if present, place Wound referral order by RN under : No    New Ostomies, if present place, Ostomy referral order under : No     Nurse 1 eSignature: Electronically signed by Malgorzata Sheehan RN on 8/18/24 at 9:55 PM EDT    **SHARE this note so that the co-signing nurse can place an eSignature**    Nurse 2 eSignature: Electronically signed by Yoandy Casey RN on 8/18/24 at 9:58 PM EDT

## 2024-08-19 NOTE — CARE COORDINATION
Case Management Assessment  Initial Evaluation    Date/Time of Evaluation: 8/19/2024 4:48 PM  Assessment Completed by: MAURILIO Woods    If patient is discharged prior to next notation, then this note serves as note for discharge by case management.    Patient Name: Charity Simpson                   YOB: 1940  Diagnosis: Altered mental status, unspecified altered mental status type [R41.82]  Pneumonia due to infectious organism, unspecified laterality, unspecified part of lung [J18.9]  AMS (altered mental status) [R41.82]  Altered mental status [R41.82]                   Date / Time: 8/18/2024  2:05 PM    Patient Admission Status: Inpatient   Readmission Risk (Low < 19, Mod (19-27), High > 27): Readmission Risk Score: 14.9    Current PCP: George Escamilla MD  PCP verified by CM? Yes    Chart Reviewed: Yes      History Provided by: Patient, Medical Record, Child/Family, Other (see comment) (SNF rep)  Patient Orientation: Alert and Oriented, Place, Person    Patient Cognition: Short Term Memory Deficit    Hospitalization in the last 30 days (Readmission):  No    If yes, Readmission Assessment in  Navigator will be completed.    Advance Directives:      Code Status: Full Code   Patient's Primary Decision Maker is: Legal Next of Kin    Primary Decision Maker: Michelle Ramirez - Child - [phone]    Discharge Planning:    Patient lives with: Other (Comment) Type of Home: Skilled Nursing Facility  Primary Care Giver: Other (Comment) (NISH)  Patient Support Systems include: Family Members   Current Financial resources:    Current community resources:    Current services prior to admission: Skilled Nursing Facility            Current DME:              Type of Home Care services:  None    ADLS  Prior functional level: Assistance with the following:, Bathing, Cooking, Housework, Shopping  Current functional level: Assistance with the following:, Bathing, Dressing, Toileting, Cooking, Housework, Shopping,  Mobility    PT AM-PAC: 10 /24  OT AM-PAC: 10 /24    Family can provide assistance at DC: No  Would you like Case Management to discuss the discharge plan with any other family members/significant others, and if so, who? Yes (dtr)  Plans to Return to Present Housing: No  Other Identified Issues/Barriers to RETURNING to current housing:  pt requiring increased assistance   Potential Assistance needed at discharge: N/A            Potential DME:    Patient expects to discharge to: Skilled nursing facility  Plan for transportation at discharge:      Financial    Payor: MEDICARE / Plan: MEDICARE PART A AND B / Product Type: *No Product type* /     Does insurance require precert for SNF: No    Potential assistance Purchasing Medications:    Meds-to-Beds request: No    No Pharmacies Listed    Notes:    Factors facilitating achievement of predicted outcomes: Family support    Barriers to discharge: Decreased endurance, Upper extremity weakness, and Lower extremity weakness    Additional Case Management Notes: Sw met with pt at bedside, dx dementia but was able to tell me she resides at [facility] and mobilizes in a w/c. They manage her meds, meals and showers. Armen did speak with Judy goldberg via phone and confirmed this. Discussed PT rec ECHO mod A x 2. She is agreeable to Katie of the [facility], [place] [phone], Fax [phone]. TOY Ortega rep is following to accept. NO PRECERT needed.IRIS will need signed by physician. HENs form will need completed prior to dc.         MAURILIO Woods  Case Management Department  Ph:  Fax:

## 2024-08-19 NOTE — PLAN OF CARE
Problem: Discharge Planning  Goal: Discharge to home or other facility with appropriate resources  8/19/2024 1032 by Sonia Adames RN  Outcome: Progressing  Flowsheets (Taken 8/19/2024 1027)  Discharge to home or other facility with appropriate resources: Identify barriers to discharge with patient and caregiver     Problem: Pain  Goal: Verbalizes/displays adequate comfort level or baseline comfort level  8/19/2024 1032 by Sonia Adames RN  Outcome: Progressing  Flowsheets  Taken 8/19/2024 1032  Verbalizes/displays adequate comfort level or baseline comfort level:   Encourage patient to monitor pain and request assistance   Assess pain using appropriate pain scale   Administer analgesics based on type and severity of pain and evaluate response  Taken 8/19/2024 1015  Verbalizes/displays adequate comfort level or baseline comfort level:   Encourage patient to monitor pain and request assistance   Assess pain using appropriate pain scale   Administer analgesics based on type and severity of pain and evaluate response     Problem: ABCDS Injury Assessment  Goal: Absence of physical injury  8/19/2024 1032 by Sonia Adames RN  Outcome: Progressing  Flowsheets (Taken 8/19/2024 1032)  Absence of Physical Injury: Implement safety measures based on patient assessment     Problem: Safety - Adult  Goal: Free from fall injury  8/19/2024 1032 by Sonia Adames RN  Outcome: Progressing  Flowsheets (Taken 8/19/2024 1032)  Free From Fall Injury: Instruct family/caregiver on patient safety     Problem: Skin/Tissue Integrity  Goal: Absence of new skin breakdown  Description: 1.  Monitor for areas of redness and/or skin breakdown  2.  Assess vascular access sites hourly  3.  Every 4-6 hours minimum:  Change oxygen saturation probe site  4.  Every 4-6 hours:  If on nasal continuous positive airway pressure, respiratory therapy assess nares and determine need for appliance change or resting period.  Outcome: Progressing    Care plan reviewed with patient.  Patient verbalizes understanding of the care plan and contributed to goal setting.

## 2024-08-19 NOTE — PROGRESS NOTES
Physical Therapy Initial Evaluation/Plan of Care    Room #:  0614/0614-02  Patient Name: Charity Simpson  YOB: 1940  MRN: [id number]    Date of Service: 8/19/2024     Tentative placement recommendation: Subacute Rehab  Equipment recommendation: To be determined      Evaluating Physical Therapist: Pete Tavarez, PT #[id number]      Specific Provider Orders/Date/Referring Provider :   08/18/24 2245    PT evaluation and treat  Start:  08/18/24 2245,   End:  08/18/24 2245,   ONE TIME,   Standing Count:  1 Occurrences,   R       George Escamilla MD    Admitting Diagnosis:   Altered mental status, unspecified altered mental status type [R41.82]  Pneumonia due to infectious organism, unspecified laterality, unspecified part of lung [J18.9]  AMS (altered mental status) [R41.82]  Altered mental status [R41.82]      Surgery: none  Visit Diagnoses         Codes    Pneumonia due to infectious organism, unspecified laterality, unspecified part of lung     J18.9            Patient Active Problem List   Diagnosis    Trauma    AMS (altered mental status)    Altered mental status        ASSESSMENT of Current Deficits Patient exhibits decreased strength, balance, and endurance impairing functional mobility, transfers, gait distance, and tolerance to activity. Pt presents with full body shakes/tremors which are new. Pt required max a to EOB and mod A x2 for stand pivot. The patient will benefit from continued skilled therapy to increase strength and improve balance for safe functional mobility, to decrease risk of falls, and to meet goals at discharge.         PHYSICAL THERAPY  PLAN OF CARE       Physical therapy plan of care is established based on physician order,  patient diagnosis and clinical assessment    Current Treatment Recommendations:    -Bed Mobility: Lower and upper extremity exercises, and trunk control activities  -Sitting Balance: Incorporate reaching activities to activate trunk muscles , Hands on support  Basic Mobility - Inpatient   How much help is needed turning from your back to your side while in a flat bed without using bedrails?: A Lot  How much help is needed moving from lying on your back to sitting on the side of a flat bed without using bedrails?: A Lot  How much help is needed moving to and from a bed to a chair?: A Lot  How much help is needed standing up from a chair using your arms?: A Lot  How much help is needed walking in hospital room?: Total  How much help is needed climbing 3-5 steps with a railing?: Total  AM-PAC Inpatient Mobility Raw Score : 10  AM-PAC Inpatient T-Scale Score : 32.29  Mobility Inpatient CMS 0-100% Score: 76.75  Mobility Inpatient CMS G-Code Modifier : CL    Nursing cleared patient for PT evaluation. The admitting diagnosis and active problem list as listed above have been reviewed prior to the initiation of this evaluation.    OBJECTIVE:   Initial Evaluation  Date: 8/19/2024 Treatment Date:     Short Term/ Long Term   Goals   Was pt agreeable to Eval/treatment? Yes  To be met in 4 days   Pain level   0/10       Bed Mobility  Using rails and head of bed elevated:     Rolling: Maximal assist of 1    Supine to sit: Maximal assist of 1    Sit to supine: Maximal assist of 1    Scooting: Maximal assist of 1    Rolling: Independent    Supine to sit: Independent    Sit to supine: Independent    Scooting: Independent     Transfers Sit to stand: Moderate assist of  2   Sit to stand: Moderate assist of 1    Ambulation     2 steps using  hand held assist with Moderate assist of  2   for balance, upright, weight shift, and safety    2 feet using  wheeled walker with Moderate assist of 1    ROM Within functional limits    Increase range of motion 10% of affected joints    Strength BUE:  refer to OT eval  RLE:  3/5  LLE:  3/5  Increase strength in affected mm groups by 1/3 grade   Balance Sitting EOB:  fair min A for tremors and balance   Dynamic Standing:  poor HHA, mod x2  Sitting EOB:

## 2024-08-19 NOTE — H&P
Department of Internal Medicine  History and Physical    PCP: Dr. George Escamilla  Admitting Physician: Dr. George Escamilla   Consultants:        CHIEF COMPLAINT:  altered mental status    HISTORY OF PRESENT ILLNESS:    This is an 84 year old female patient that was brought in with reports of altered mental status.  In the ER, the patient was having intermittent episodes of screaming and saying things that didn't make any sense.  The patient does report having low back pain that radiates down her right leg.  She denies having any falls.   The chest xray suggest questionable pneumonia.  The laboratory studies show elevated lactic acid, elevated calcium, and elevated troponin.  At this time she is being admitted for evaluation and treatment.      PAST MEDICAL Hx:  Past Medical History:   Diagnosis Date    Cardiac arrhythmia     Closed C4 fracture (HCC) 01/2021    Dementia (HCC)     Depression     Esophageal dysphagia     Giant cell arteritis (HCC)     Hyperlipidemia     Hypertension     Idiopathic progressive polyneuropathy     Migraine     Migraine without aura and without status migrainosus, not intractable     Nausea     Rheumatoid arthritis (HCC)     Supraspinatus tendinitis     Urinary incontinence        PAST SURGICAL Hx:   Past Surgical History:   Procedure Laterality Date    ABDOMEN SURGERY         FAMILY Hx:  Family History   Problem Relation Age of Onset    Heart Disease Mother     Cancer Father         melanoma       HOME MEDICATIONS:  Prior to Admission medications    Medication Sig Start Date End Date Taking? Authorizing Provider   gabapentin (NEURONTIN) 300 MG capsule Take 1 capsule by mouth 3 times daily.   Yes ProviderMelinda MD   meclizine (ANTIVERT) 12.5 MG tablet Take 1 tablet by mouth every 8 hours as needed for Dizziness   Yes ProviderMelinda MD   FLUoxetine (PROZAC) 40 MG capsule Take 1 capsule by mouth daily   Yes Melinda Saravia MD   polyvinyl alcohol (LIQUIFILM TEARS) 1.4 %

## 2024-08-19 NOTE — PROGRESS NOTES
SPEECH/LANGUAGE PATHOLOGY  CLINICAL ASSESSMENT OF SWALLOWING FUNCTION   and PLAN OF CARE    PATIENT NAME:  Charity Simpson  (female)     MRN:  [id number]    :  1940  (84 y.o.)  STATUS:  Inpatient: Room 0614-    TODAY'S DATE:  2024  REFERRING PROVIDER:     SLP swallowing-dysphagia evaluation and treatment  Start:  24,   End:  24,   ONE TIME,   Standing Count:  1 Occurrences,   R       George Escamilla MD   REASON FOR REFERRAL: Patient states that she has difficulty swallowing   EVALUATING THERAPIST: Mariposa Cueto, MARISA                 RESULTS:    DYSPHAGIA DIAGNOSIS:   Clinical indicators of moderate  to severe oropharyngeal phase dysphagia     Had outpatient MBSS 24 and is more compromised compared to that exam.   Impaired mastication very dry oral cavity    Laryngeal Penetration and Aspiration:  Penetration WITHOUT aspiration was observed in today's study with  thin liquid--very shallow and inconsistent     DIET RECOMMENDATIONS:  Pureed consistency solids (IDDSI level 4) with  nectar consistency (mildly thick - IDDSI level 2) liquids     FEEDING RECOMMENDATIONS:     Assistance level:  Stand by/ Hand over Hand assistance is needed during all oral intake  Set-up is required for all oral intake  Supervision is needed during all oral intake  Encourage self-feeding as function allows  Verbal cueing for implementation of safe swallow strategies       Compensatory strategies recommended: Thorough oral care to prevent colonization of oral bacteria   Upright in bed/ chair as tolerated  Fully alert for all PO  Chin neutral to slightly down   Encourage oral clearing of bolus before next bite/sip is taken  Incorporate liquids into solids during mastication before initiating A-P propulsion  Slow rate of intake   SINGLE cup sips  NO STRAW      Discussed recommendations with:  charge nurse in person    SPEECH THERAPY  PLAN OF CARE   The dysphagia POC is established based on physician  order, dysphagia diagnosis and results of clinical assessment     Skilled SLP intervention for dysphagia management on acute care up to 5 x per week until goals met, pt plateaus in function and/or discharged from hospital    Conditions Requiring Skilled Therapeutic Intervention for dysphagia:    Patient is performing below functional baseline d/t  current acute condition, respiratory compromise, multiple medications, and/or increased dependency upon caregivers.  impaired mastication   Coughing during PO intake      Specific dysphagia interventions to include:     compensatory swallowing strategies to improve airway protection and swallow function.  Training in positioning for improved integrity of swallow  ongoing mealtime assessment to provide diet modification and compensatory strategy implementation to minimize risk of aspiration associated with PO intake  PO trials of upgraded diet textures with SLP only to determine the least restrictive PO diet     Specific instructions for next treatment:  development and training of compensatory swallow strategies to improve airway protection and swallow function  Patient Treatment Goals:    Short Term Goals:  Pt will implement identified compensatory swallowing strategies on 90% of opportunities or greater to improve airway protection and swallow function.  Pt will improve bolus prep/control and mastication with  minimal verbal prompts to advance diet grade by 1 IDDSI level .  Pt will decrease clinical indicators of airway compromise to 2 or less during swallowing of 8 ounces of liquid  minimal verbal prompts    Long Term Goals:   Pt will maintain adequate nutrition/hydration via PO intake of the least restrictive oral diet with implementation of safe swallow/ compensatory strategies and decrease signs/symptoms of aspiration to less than 1 x/day.      Patient/family Goal:    To be able to eat/drink     Plan of care discussed with Patient   The Patient did not demonstrate

## 2024-08-19 NOTE — PROGRESS NOTES
Comprehensive Nutrition Assessment    Type and Reason for Visit:  Initial, Positive Nutrition Screen (MST 2)    Nutrition Recommendations/Plan:   Continue Current Diet  Consult RD as needed    RD Note: Pt refusing all ONS's      Malnutrition Assessment:  Malnutrition Status:  At risk for malnutrition (Comment) (08/19/24 5486)    Context:  Chronic Illness     Findings of the 6 clinical characteristics of malnutrition:  Energy Intake:  Mild decrease in energy intake (Comment) (poor po inatake 2 days prior to admission)  Weight Loss:  Mild weight loss (specify amount and time period) (-6% in three months)     Body Fat Loss:  Mild body fat loss Orbital, Triceps   Muscle Mass Loss:  Mild muscle mass loss Temples (temporalis), Clavicles (pectoralis & deltoids), Thigh (quadriceps)  Fluid Accumulation:  No significant fluid accumulation     Strength:  Not Performed    Nutrition Assessment:    Pt admit w/ AMS, PNA, whole body tremors. PMHx: C4 fracture, Depression, Dysphagia, HLD/HTN, Migraine, Rhuematoid Arthritis. Pt CT, MRI pending. PT reports poor appetite and poor po intake. Pt declines all ONS's. Will continue inpatient monitoring, f/up per policy.    Nutrition Related Findings:    A/O x4, I/O not recorded, abd wdl, bowel sounds active x4, Troponin 21, ALK PHOS 118 Wound Type: None       Current Nutrition Intake & Therapies:    Average Meal Intake: 1-25%  Average Supplements Intake: Refusing to take  ADULT DIET; Dysphagia - Minced and Moist    Anthropometric Measures:  Height: 170.2 cm (5' 7.01\")  Ideal Body Weight (IBW): 135 lbs (61 kg)    Admission Body Weight: 61.7 kg (136 lb 0.4 oz)  Current Body Weight: 61.7 kg (136 lb 0.4 oz), 100.8 % IBW. Weight Source: Not Specified (08/19/24)  Current BMI (kg/m2): 21.3  Usual Body Weight: 65.8 kg (145 lb 1 oz) (05/27/24)  % Weight Change (Calculated): -6.2  Weight Adjustment For: No Adjustment        BMI Categories: Underweight (BMI less than 22) age over 65    Estimated

## 2024-08-20 LAB
ACB COMPLEX DNA BLD POS QL NAA+NON-PROBE: NOT DETECTED
B FRAGILIS DNA BLD POS QL NAA+NON-PROBE: NOT DETECTED
BIOFIRE TEST COMMENT: ABNORMAL
C ALBICANS DNA BLD POS QL NAA+NON-PROBE: NOT DETECTED
C AURIS DNA BLD POS QL NAA+NON-PROBE: NOT DETECTED
C GATTII+NEOFOR DNA BLD POS QL NAA+N-PRB: NOT DETECTED
C GLABRATA DNA BLD POS QL NAA+NON-PROBE: NOT DETECTED
C KRUSEI DNA BLD POS QL NAA+NON-PROBE: NOT DETECTED
C PARAP DNA BLD POS QL NAA+NON-PROBE: NOT DETECTED
C TROPICLS DNA BLD POS QL NAA+NON-PROBE: NOT DETECTED
E CLOAC COMP DNA BLD POS NAA+NON-PROBE: NOT DETECTED
E COLI DNA BLD POS QL NAA+NON-PROBE: NOT DETECTED
E FAECALIS DNA BLD POS QL NAA+NON-PROBE: NOT DETECTED
E FAECIUM DNA BLD POS QL NAA+NON-PROBE: NOT DETECTED
ENTEROBACTERALES DNA BLD POS NAA+N-PRB: NOT DETECTED
GP B STREP DNA BLD POS QL NAA+NON-PROBE: NOT DETECTED
HAEM INFLU DNA BLD POS QL NAA+NON-PROBE: NOT DETECTED
K OXYTOCA DNA BLD POS QL NAA+NON-PROBE: NOT DETECTED
KLEBSIELLA SP DNA BLD POS QL NAA+NON-PRB: NOT DETECTED
KLEBSIELLA SP DNA BLD POS QL NAA+NON-PRB: NOT DETECTED
L MONOCYTOG DNA BLD POS QL NAA+NON-PROBE: NOT DETECTED
MECA+MECC ISLT/SPM QL: NOT DETECTED
MICROORGANISM SPEC CULT: ABNORMAL
MICROORGANISM/AGENT SPEC: ABNORMAL
N MEN DNA BLD POS QL NAA+NON-PROBE: NOT DETECTED
P AERUGINOSA DNA BLD POS NAA+NON-PROBE: NOT DETECTED
PROTEUS SP DNA BLD POS QL NAA+NON-PROBE: NOT DETECTED
S AUREUS DNA BLD POS QL NAA+NON-PROBE: NOT DETECTED
S AUREUS+CONS DNA BLD POS NAA+NON-PROBE: DETECTED
S EPIDERMIDIS DNA BLD POS QL NAA+NON-PRB: DETECTED
S LUGDUNENSIS DNA BLD POS QL NAA+NON-PRB: NOT DETECTED
S MALTOPHILIA DNA BLD POS QL NAA+NON-PRB: NOT DETECTED
S MARCESCENS DNA BLD POS NAA+NON-PROBE: NOT DETECTED
S PNEUM DNA BLD POS QL NAA+NON-PROBE: NOT DETECTED
S PYO DNA BLD POS QL NAA+NON-PROBE: NOT DETECTED
SALMONELLA DNA BLD POS QL NAA+NON-PROBE: NOT DETECTED
SERVICE CMNT-IMP: ABNORMAL
SPECIMEN DESCRIPTION: ABNORMAL
STREPTOCOCCUS DNA BLD POS NAA+NON-PROBE: NOT DETECTED

## 2024-08-20 PROCEDURE — 2500000003 HC RX 250 WO HCPCS: Performed by: INTERNAL MEDICINE

## 2024-08-20 PROCEDURE — 6360000002 HC RX W HCPCS: Performed by: INTERNAL MEDICINE

## 2024-08-20 PROCEDURE — 6370000000 HC RX 637 (ALT 250 FOR IP): Performed by: INTERNAL MEDICINE

## 2024-08-20 PROCEDURE — 2060000000 HC ICU INTERMEDIATE R&B

## 2024-08-20 PROCEDURE — 2580000003 HC RX 258: Performed by: INTERNAL MEDICINE

## 2024-08-20 RX ORDER — ACETAMINOPHEN 325 MG/1
650 TABLET ORAL EVERY 4 HOURS PRN
Status: DISCONTINUED | OUTPATIENT
Start: 2024-08-20 | End: 2024-08-20 | Stop reason: CLARIF

## 2024-08-20 RX ORDER — SENNOSIDES 8.6 MG
1300 CAPSULE ORAL EVERY 8 HOURS SCHEDULED
Status: DISCONTINUED | OUTPATIENT
Start: 2024-08-20 | End: 2024-08-23 | Stop reason: HOSPADM

## 2024-08-20 RX ADMIN — Medication 1000 UNITS: at 20:44

## 2024-08-20 RX ADMIN — TRAMADOL HYDROCHLORIDE 50 MG: 50 TABLET ORAL at 20:44

## 2024-08-20 RX ADMIN — OXYBUTYNIN CHLORIDE 5 MG: 5 TABLET ORAL at 08:53

## 2024-08-20 RX ADMIN — SODIUM CHLORIDE, PRESERVATIVE FREE 10 ML: 5 INJECTION INTRAVENOUS at 20:45

## 2024-08-20 RX ADMIN — MECLIZINE HYDROCHLORIDE 12.5 MG: 12.5 TABLET ORAL at 20:44

## 2024-08-20 RX ADMIN — OXYBUTYNIN CHLORIDE 5 MG: 5 TABLET ORAL at 15:58

## 2024-08-20 RX ADMIN — MECLIZINE HYDROCHLORIDE 12.5 MG: 12.5 TABLET ORAL at 15:59

## 2024-08-20 RX ADMIN — TEMAZEPAM 15 MG: 15 CAPSULE ORAL at 20:44

## 2024-08-20 RX ADMIN — POLYVINYL ALCOHOL, POVIDONE 1 DROP: 14; 6 SOLUTION/ DROPS OPHTHALMIC at 08:53

## 2024-08-20 RX ADMIN — DOCUSATE SODIUM 100 MG: 100 CAPSULE, LIQUID FILLED ORAL at 08:53

## 2024-08-20 RX ADMIN — Medication 1300 MG: at 22:10

## 2024-08-20 RX ADMIN — DOXYCYCLINE 100 MG: 100 INJECTION, POWDER, LYOPHILIZED, FOR SOLUTION INTRAVENOUS at 09:07

## 2024-08-20 RX ADMIN — FOLIC ACID 1000 MCG: 1 TABLET ORAL at 08:52

## 2024-08-20 RX ADMIN — Medication 1000 UNITS: at 08:52

## 2024-08-20 RX ADMIN — POLYVINYL ALCOHOL, POVIDONE 1 DROP: 14; 6 SOLUTION/ DROPS OPHTHALMIC at 20:44

## 2024-08-20 RX ADMIN — FLUOXETINE HYDROCHLORIDE 40 MG: 20 CAPSULE ORAL at 08:53

## 2024-08-20 RX ADMIN — POLYVINYL ALCOHOL, POVIDONE 1 DROP: 14; 6 SOLUTION/ DROPS OPHTHALMIC at 15:58

## 2024-08-20 RX ADMIN — Medication 1300 MG: at 15:57

## 2024-08-20 RX ADMIN — DOCUSATE SODIUM 100 MG: 100 CAPSULE, LIQUID FILLED ORAL at 20:44

## 2024-08-20 RX ADMIN — GABAPENTIN 300 MG: 300 CAPSULE ORAL at 14:08

## 2024-08-20 RX ADMIN — TRAMADOL HYDROCHLORIDE 50 MG: 50 TABLET ORAL at 08:32

## 2024-08-20 RX ADMIN — MEMANTINE 10 MG: 10 TABLET ORAL at 08:53

## 2024-08-20 RX ADMIN — MECLIZINE HYDROCHLORIDE 12.5 MG: 12.5 TABLET ORAL at 08:52

## 2024-08-20 RX ADMIN — OXYBUTYNIN CHLORIDE 5 MG: 5 TABLET ORAL at 20:44

## 2024-08-20 RX ADMIN — ENOXAPARIN SODIUM 40 MG: 100 INJECTION SUBCUTANEOUS at 08:55

## 2024-08-20 RX ADMIN — SODIUM CHLORIDE, PRESERVATIVE FREE 10 ML: 5 INJECTION INTRAVENOUS at 09:03

## 2024-08-20 RX ADMIN — DOXYCYCLINE 100 MG: 100 INJECTION, POWDER, LYOPHILIZED, FOR SOLUTION INTRAVENOUS at 20:43

## 2024-08-20 RX ADMIN — GABAPENTIN 300 MG: 300 CAPSULE ORAL at 20:44

## 2024-08-20 RX ADMIN — MEMANTINE 10 MG: 10 TABLET ORAL at 20:44

## 2024-08-20 RX ADMIN — WATER 2000 MG: 1 INJECTION INTRAMUSCULAR; INTRAVENOUS; SUBCUTANEOUS at 20:43

## 2024-08-20 RX ADMIN — ACETAMINOPHEN 650 MG: 325 TABLET ORAL at 04:18

## 2024-08-20 RX ADMIN — GABAPENTIN 300 MG: 300 CAPSULE ORAL at 08:53

## 2024-08-20 RX ADMIN — POLYETHYLENE GLYCOL 3350 17 G: 17 POWDER, FOR SOLUTION ORAL at 09:01

## 2024-08-20 RX ADMIN — DULOXETINE 30 MG: 30 CAPSULE, DELAYED RELEASE ORAL at 12:58

## 2024-08-20 ASSESSMENT — PAIN SCALES - GENERAL
PAINLEVEL_OUTOF10: 4
PAINLEVEL_OUTOF10: 6
PAINLEVEL_OUTOF10: 6
PAINLEVEL_OUTOF10: 9
PAINLEVEL_OUTOF10: 8
PAINLEVEL_OUTOF10: 0

## 2024-08-20 ASSESSMENT — PAIN DESCRIPTION - DESCRIPTORS
DESCRIPTORS: ACHING;DISCOMFORT
DESCRIPTORS: BURNING;DISCOMFORT
DESCRIPTORS: ACHING;DISCOMFORT

## 2024-08-20 ASSESSMENT — PAIN DESCRIPTION - LOCATION
LOCATION: BACK;GENERALIZED
LOCATION: BACK
LOCATION: BACK
LOCATION: BACK;LEG

## 2024-08-20 ASSESSMENT — PAIN - FUNCTIONAL ASSESSMENT
PAIN_FUNCTIONAL_ASSESSMENT: PREVENTS OR INTERFERES SOME ACTIVE ACTIVITIES AND ADLS
PAIN_FUNCTIONAL_ASSESSMENT: ACTIVITIES ARE NOT PREVENTED

## 2024-08-20 ASSESSMENT — PAIN DESCRIPTION - FREQUENCY: FREQUENCY: CONTINUOUS

## 2024-08-20 ASSESSMENT — PAIN DESCRIPTION - ORIENTATION
ORIENTATION: RIGHT;LEFT
ORIENTATION: MID;POSTERIOR
ORIENTATION: MID

## 2024-08-20 ASSESSMENT — PAIN SCALES - WONG BAKER: WONGBAKER_NUMERICALRESPONSE: HURTS LITTLE MORE

## 2024-08-20 ASSESSMENT — PAIN DESCRIPTION - PAIN TYPE: TYPE: CHRONIC PAIN

## 2024-08-20 ASSESSMENT — PAIN DESCRIPTION - ONSET: ONSET: ON-GOING

## 2024-08-20 NOTE — PLAN OF CARE
Problem: Discharge Planning  Goal: Discharge to home or other facility with appropriate resources  8/20/2024 0053 by Monika Gallegos RN  Outcome: Progressing     Problem: Pain  Goal: Verbalizes/displays adequate comfort level or baseline comfort level  8/20/2024 0053 by Monika Gallegos RN  Outcome: Progressing     Problem: ABCDS Injury Assessment  Goal: Absence of physical injury  8/20/2024 0053 by Monika Gallegos RN  Outcome: Progressing     Problem: Safety - Adult  Goal: Free from fall injury  8/20/2024 0053 by Monika Gallegos RN  Outcome: Progressing     Problem: Skin/Tissue Integrity  Goal: Absence of new skin breakdown  Description: 1.  Monitor for areas of redness and/or skin breakdown  2.  Assess vascular access sites hourly  3.  Every 4-6 hours minimum:  Change oxygen saturation probe site  4.  Every 4-6 hours:  If on nasal continuous positive airway pressure, respiratory therapy assess nares and determine need for appliance change or resting period.  8/20/2024 0053 by Monika Gallegos RN  Outcome: Progressing     Problem: Nutrition Deficit:  Goal: Optimize nutritional status  8/20/2024 0053 by Monika Gallegos RN  Outcome: Progressing

## 2024-08-20 NOTE — PLAN OF CARE
Problem: Discharge Planning  Goal: Discharge to home or other facility with appropriate resources  Outcome: Progressing  Flowsheets (Taken 8/20/2024 0830)  Discharge to home or other facility with appropriate resources: Identify barriers to discharge with patient and caregiver     Problem: Pain  Goal: Verbalizes/displays adequate comfort level or baseline comfort level  Outcome: Progressing  Flowsheets (Taken 8/20/2024 1605)  Verbalizes/displays adequate comfort level or baseline comfort level:   Encourage patient to monitor pain and request assistance   Assess pain using appropriate pain scale   Administer analgesics based on type and severity of pain and evaluate response     Problem: ABCDS Injury Assessment  Goal: Absence of physical injury  Outcome: Progressing  Flowsheets (Taken 8/19/2024 1032)  Absence of Physical Injury: Implement safety measures based on patient assessment     Problem: Safety - Adult  Goal: Free from fall injury  Outcome: Progressing  Flowsheets (Taken 8/19/2024 1032)  Free From Fall Injury: Instruct family/caregiver on patient safety     Problem: Skin/Tissue Integrity  Goal: Absence of new skin breakdown  Description: 1.  Monitor for areas of redness and/or skin breakdown  2.  Assess vascular access sites hourly  3.  Every 4-6 hours minimum:  Change oxygen saturation probe site  4.  Every 4-6 hours:  If on nasal continuous positive airway pressure, respiratory therapy assess nares and determine need for appliance change or resting period.  Outcome: Progressing     Problem: Nutrition Deficit:  Goal: Optimize nutritional status  Outcome: Progressing  Flowsheets (Taken 8/20/2024 1605)  Nutrient intake appropriate for improving, restoring, or maintaining nutritional needs:   Assess nutritional status and recommend course of action   Monitor oral intake, labs, and treatment plans   Recommend appropriate diets, oral nutritional supplements, and vitamin/mineral supplements

## 2024-08-20 NOTE — CARE COORDINATION
SOCIAL WORK / DISCHARGE PLANNING:  Pt was accepted to [facility], [facility] [phone], Fax [phone], NO PRECERT needed. Bed will be available 8/21. Await IV plan, if needed they can not do IV q8 or less, will need IV access placed prior to dc. IRIS will need signed by physician. HENS form initiated, will need completed prior to dc.             Electronically signed by MAURILIO Woods on 8/20/2024 at 10:07 AM

## 2024-08-20 NOTE — PLAN OF CARE
Problem: Discharge Planning  Goal: Discharge to home or other facility with appropriate resources  8/19/2024 2145 by Daphne Castro RN  Outcome: Progressing  8/19/2024 1032 by Sonia Adames RN  Outcome: Progressing  Flowsheets (Taken 8/19/2024 1027)  Discharge to home or other facility with appropriate resources: Identify barriers to discharge with patient and caregiver     Problem: Pain  Goal: Verbalizes/displays adequate comfort level or baseline comfort level  8/19/2024 2145 by Daphne Castro RN  Outcome: Progressing  Flowsheets (Taken 8/19/2024 1445 by Sonia Adames RN)  Verbalizes/displays adequate comfort level or baseline comfort level: Encourage patient to monitor pain and request assistance  8/19/2024 1032 by Sonia Adames RN  Outcome: Progressing  Flowsheets  Taken 8/19/2024 1032  Verbalizes/displays adequate comfort level or baseline comfort level:   Encourage patient to monitor pain and request assistance   Assess pain using appropriate pain scale   Administer analgesics based on type and severity of pain and evaluate response  Taken 8/19/2024 1015  Verbalizes/displays adequate comfort level or baseline comfort level:   Encourage patient to monitor pain and request assistance   Assess pain using appropriate pain scale   Administer analgesics based on type and severity of pain and evaluate response     Problem: ABCDS Injury Assessment  Goal: Absence of physical injury  8/19/2024 2145 by Daphne Castro RN  Outcome: Progressing  8/19/2024 1032 by Sonia Adames RN  Outcome: Progressing  Flowsheets (Taken 8/19/2024 1032)  Absence of Physical Injury: Implement safety measures based on patient assessment     Problem: Safety - Adult  Goal: Free from fall injury  8/19/2024 2145 by Daphne Castro RN  Outcome: Progressing  8/19/2024 1032 by Sonia Adames RN  Outcome: Progressing  Flowsheets (Taken 8/19/2024 1032)  Free From Fall Injury: Instruct family/caregiver on patient

## 2024-08-20 NOTE — PROGRESS NOTES
THE PT WAS COMPLAINING OF BACK AND LEG PAIN, THIS NURSE OFFER TO HELP REPOSITION THE PATIENT. THE PATIENT REFUSED STATED THE WAY SHE WAS LAYING WAS THE BEST FOR HER. PT WAS GIVEN CALL LIGHT AND INSTRUCTED TO CALL THE NURSE FOR ANY FURTHER ASSISTANCE NEEDS.

## 2024-08-21 LAB
ALBUMIN SERPL-MCNC: 3.2 G/DL (ref 3.5–5.2)
ALP SERPL-CCNC: 101 U/L (ref 35–104)
ALT SERPL-CCNC: 6 U/L (ref 0–32)
AMPHET UR QL SCN: NEGATIVE
ANION GAP SERPL CALCULATED.3IONS-SCNC: 10 MMOL/L (ref 7–16)
AST SERPL-CCNC: 20 U/L (ref 0–31)
BARBITURATES UR QL SCN: NEGATIVE
BENZODIAZ UR QL: POSITIVE
BILIRUB SERPL-MCNC: 0.5 MG/DL (ref 0–1.2)
BUN SERPL-MCNC: 9 MG/DL (ref 6–23)
BUPRENORPHINE UR QL: NEGATIVE
CALCIUM SERPL-MCNC: 9.3 MG/DL (ref 8.6–10.2)
CANNABINOIDS UR QL SCN: NEGATIVE
CHLORIDE SERPL-SCNC: 99 MMOL/L (ref 98–107)
CO2 SERPL-SCNC: 28 MMOL/L (ref 22–29)
COCAINE UR QL SCN: NEGATIVE
CREAT SERPL-MCNC: 0.7 MG/DL (ref 0.5–1)
ERYTHROCYTE [DISTWIDTH] IN BLOOD BY AUTOMATED COUNT: 17 % (ref 11.5–15)
FENTANYL UR QL: POSITIVE
GFR, ESTIMATED: 82 ML/MIN/1.73M2
GLUCOSE SERPL-MCNC: 102 MG/DL (ref 74–99)
HCT VFR BLD AUTO: 35.1 % (ref 34–48)
HGB BLD-MCNC: 10.6 G/DL (ref 11.5–15.5)
MCH RBC QN AUTO: 27.1 PG (ref 26–35)
MCHC RBC AUTO-ENTMCNC: 30.2 G/DL (ref 32–34.5)
MCV RBC AUTO: 89.8 FL (ref 80–99.9)
METHADONE UR QL: NEGATIVE
OPIATES UR QL SCN: NEGATIVE
OXYCODONE UR QL SCN: NEGATIVE
PCP UR QL SCN: NEGATIVE
PLATELET # BLD AUTO: 239 K/UL (ref 130–450)
PMV BLD AUTO: 8.9 FL (ref 7–12)
POTASSIUM SERPL-SCNC: 3.7 MMOL/L (ref 3.5–5)
PROCALCITONIN SERPL-MCNC: 0.08 NG/ML (ref 0–0.08)
PROT SERPL-MCNC: 6.7 G/DL (ref 6.4–8.3)
RBC # BLD AUTO: 3.91 M/UL (ref 3.5–5.5)
SODIUM SERPL-SCNC: 137 MMOL/L (ref 132–146)
TEST INFORMATION: ABNORMAL
WBC OTHER # BLD: 5.7 K/UL (ref 4.5–11.5)

## 2024-08-21 PROCEDURE — 36415 COLL VENOUS BLD VENIPUNCTURE: CPT

## 2024-08-21 PROCEDURE — 97110 THERAPEUTIC EXERCISES: CPT | Performed by: PHYSICAL THERAPIST

## 2024-08-21 PROCEDURE — 6360000002 HC RX W HCPCS: Performed by: INTERNAL MEDICINE

## 2024-08-21 PROCEDURE — 80053 COMPREHEN METABOLIC PANEL: CPT

## 2024-08-21 PROCEDURE — 2500000003 HC RX 250 WO HCPCS: Performed by: INTERNAL MEDICINE

## 2024-08-21 PROCEDURE — 6370000000 HC RX 637 (ALT 250 FOR IP): Performed by: INTERNAL MEDICINE

## 2024-08-21 PROCEDURE — 2580000003 HC RX 258: Performed by: INTERNAL MEDICINE

## 2024-08-21 PROCEDURE — 85027 COMPLETE CBC AUTOMATED: CPT

## 2024-08-21 PROCEDURE — 80307 DRUG TEST PRSMV CHEM ANLYZR: CPT

## 2024-08-21 PROCEDURE — 97530 THERAPEUTIC ACTIVITIES: CPT | Performed by: PHYSICAL THERAPIST

## 2024-08-21 PROCEDURE — 92526 ORAL FUNCTION THERAPY: CPT | Performed by: SPEECH-LANGUAGE PATHOLOGIST

## 2024-08-21 PROCEDURE — 84145 PROCALCITONIN (PCT): CPT

## 2024-08-21 PROCEDURE — 97535 SELF CARE MNGMENT TRAINING: CPT

## 2024-08-21 PROCEDURE — 2060000000 HC ICU INTERMEDIATE R&B

## 2024-08-21 RX ADMIN — MECLIZINE HYDROCHLORIDE 12.5 MG: 12.5 TABLET ORAL at 08:34

## 2024-08-21 RX ADMIN — OXYBUTYNIN CHLORIDE 5 MG: 5 TABLET ORAL at 14:53

## 2024-08-21 RX ADMIN — MEMANTINE 10 MG: 10 TABLET ORAL at 08:28

## 2024-08-21 RX ADMIN — OXYBUTYNIN CHLORIDE 5 MG: 5 TABLET ORAL at 08:28

## 2024-08-21 RX ADMIN — WATER 2000 MG: 1 INJECTION INTRAMUSCULAR; INTRAVENOUS; SUBCUTANEOUS at 21:27

## 2024-08-21 RX ADMIN — POLYVINYL ALCOHOL, POVIDONE 1 DROP: 14; 6 SOLUTION/ DROPS OPHTHALMIC at 21:17

## 2024-08-21 RX ADMIN — FLUOXETINE HYDROCHLORIDE 40 MG: 20 CAPSULE ORAL at 08:27

## 2024-08-21 RX ADMIN — POLYETHYLENE GLYCOL 3350 17 G: 17 POWDER, FOR SOLUTION ORAL at 08:27

## 2024-08-21 RX ADMIN — TRAMADOL HYDROCHLORIDE 50 MG: 50 TABLET ORAL at 21:17

## 2024-08-21 RX ADMIN — SODIUM CHLORIDE, PRESERVATIVE FREE 10 ML: 5 INJECTION INTRAVENOUS at 21:37

## 2024-08-21 RX ADMIN — GABAPENTIN 300 MG: 300 CAPSULE ORAL at 08:28

## 2024-08-21 RX ADMIN — Medication 1000 UNITS: at 21:17

## 2024-08-21 RX ADMIN — POLYVINYL ALCOHOL, POVIDONE 1 DROP: 14; 6 SOLUTION/ DROPS OPHTHALMIC at 14:54

## 2024-08-21 RX ADMIN — GABAPENTIN 300 MG: 300 CAPSULE ORAL at 14:53

## 2024-08-21 RX ADMIN — ENOXAPARIN SODIUM 40 MG: 100 INJECTION SUBCUTANEOUS at 08:27

## 2024-08-21 RX ADMIN — ONDANSETRON 4 MG: 4 TABLET, ORALLY DISINTEGRATING ORAL at 13:38

## 2024-08-21 RX ADMIN — Medication 1300 MG: at 21:18

## 2024-08-21 RX ADMIN — MECLIZINE HYDROCHLORIDE 12.5 MG: 12.5 TABLET ORAL at 21:17

## 2024-08-21 RX ADMIN — MECLIZINE HYDROCHLORIDE 12.5 MG: 12.5 TABLET ORAL at 14:53

## 2024-08-21 RX ADMIN — DOXYCYCLINE 100 MG: 100 INJECTION, POWDER, LYOPHILIZED, FOR SOLUTION INTRAVENOUS at 09:18

## 2024-08-21 RX ADMIN — TEMAZEPAM 15 MG: 15 CAPSULE ORAL at 21:16

## 2024-08-21 RX ADMIN — Medication 1000 UNITS: at 08:27

## 2024-08-21 RX ADMIN — OXYBUTYNIN CHLORIDE 5 MG: 5 TABLET ORAL at 21:17

## 2024-08-21 RX ADMIN — GABAPENTIN 300 MG: 300 CAPSULE ORAL at 21:17

## 2024-08-21 RX ADMIN — DULOXETINE 30 MG: 30 CAPSULE, DELAYED RELEASE ORAL at 14:53

## 2024-08-21 RX ADMIN — FOLIC ACID 1000 MCG: 1 TABLET ORAL at 08:27

## 2024-08-21 RX ADMIN — Medication 1300 MG: at 05:11

## 2024-08-21 RX ADMIN — SODIUM CHLORIDE, PRESERVATIVE FREE 10 ML: 5 INJECTION INTRAVENOUS at 08:33

## 2024-08-21 RX ADMIN — DOCUSATE SODIUM 100 MG: 100 CAPSULE, LIQUID FILLED ORAL at 08:28

## 2024-08-21 RX ADMIN — DOXYCYCLINE 100 MG: 100 INJECTION, POWDER, LYOPHILIZED, FOR SOLUTION INTRAVENOUS at 21:31

## 2024-08-21 RX ADMIN — DOCUSATE SODIUM 100 MG: 100 CAPSULE, LIQUID FILLED ORAL at 21:37

## 2024-08-21 RX ADMIN — MEMANTINE 10 MG: 10 TABLET ORAL at 21:17

## 2024-08-21 RX ADMIN — POLYVINYL ALCOHOL, POVIDONE 1 DROP: 14; 6 SOLUTION/ DROPS OPHTHALMIC at 09:06

## 2024-08-21 ASSESSMENT — PAIN SCALES - GENERAL
PAINLEVEL_OUTOF10: 6
PAINLEVEL_OUTOF10: 0
PAINLEVEL_OUTOF10: 6

## 2024-08-21 ASSESSMENT — PAIN DESCRIPTION - LOCATION
LOCATION: BACK;GENERALIZED
LOCATION: BACK

## 2024-08-21 ASSESSMENT — PAIN DESCRIPTION - DESCRIPTORS
DESCRIPTORS: ACHING
DESCRIPTORS: ACHING;CRAMPING

## 2024-08-21 NOTE — PROGRESS NOTES
SPEECH LANGUAGE PATHOLOGY  DAILY PROGRESS NOTE      PATIENT NAME:  Charity Simpson      :  1940          TODAY'S DATE:  2024 ROOM:  [address] [place] [address]    Current Diet: ADULT DIET; Dysphagia - Minced and Moist    Patient seen for dysphagia patient had emesis prior to lunch per her report and nursing assistant.  Patient up to chair for meal.  Continues to complain that her throat burns and increased mucous.  Patient did take a couple bites of pudding and she reports that did decrease the burning in her throat but she still felt nauseated.  Poor intake due to this ongoing discomfort.  She keeps stating I just want to die.  Needs encouragement to try to remain relaxed and just eat slowly     Recommendation: continue POC      CPT code(s) 71087  dysphagia tx  Total minutes :  15 minutes    Mariposa Cueto MSCCC/SLP  Speech Language Pathologist  [phone]

## 2024-08-21 NOTE — PLAN OF CARE
Problem: Discharge Planning  Goal: Discharge to home or other facility with appropriate resources  Outcome: Progressing  Flowsheets (Taken 8/21/2024 2461)  Discharge to home or other facility with appropriate resources:   Identify barriers to discharge with patient and caregiver   Arrange for needed discharge resources and transportation as appropriate   Identify discharge learning needs (meds, wound care, etc)     Problem: Pain  Goal: Verbalizes/displays adequate comfort level or baseline comfort level  Outcome: Progressing     Problem: ABCDS Injury Assessment  Goal: Absence of physical injury  Outcome: Progressing     Problem: Safety - Adult  Goal: Free from fall injury  Outcome: Progressing     Problem: Skin/Tissue Integrity  Goal: Absence of new skin breakdown  Description: 1.  Monitor for areas of redness and/or skin breakdown  2.  Assess vascular access sites hourly  3.  Every 4-6 hours minimum:  Change oxygen saturation probe site  4.  Every 4-6 hours:  If on nasal continuous positive airway pressure, respiratory therapy assess nares and determine need for appliance change or resting period.  Outcome: Progressing     Problem: Nutrition Deficit:  Goal: Optimize nutritional status  Outcome: Progressing

## 2024-08-21 NOTE — CONSULTS
08/19/24 0722 08/24/24 2059 08/19/24 0900  doxycycline (VIBRAMYCIN) 100 mg in sodium chloride 0.9 % 100 mL IVPB ([id number])  100 mg,   IntraVENous,   2 TIMES DAILY         08/19/24 0722 08/24/24 0859                      REVIEW OF SYSTEMS     CONSTITUTIONAL:   No fever, chills, weight loss  ALLERGIES:    No rash or itch  EYES:     No visual changes  ENT:      No  hearing loss or sore throat  CARDIOVASCULAR:   No chest pain or palpitations  RESPIRATORY:   No cough, sob  ENDOCRINE:    No increase thirst, urination   HEME-LYMPH:   No easy bruising or bleeding disorder  GI:     No nausea, vomiting or diarrhea  :     No urinary complaints  NEURO:    No seizures, stroke, HA  MUSCULOSKELETAL:  No muscle aches or pain, no joint pain  SKIN:     No rash, ulcers or wounds  PSYCH:    No depression or anxiety    Medications Prior to Admission: gabapentin (NEURONTIN) 300 MG capsule, Take 1 capsule by mouth 3 times daily.  meclizine (ANTIVERT) 12.5 MG tablet, Take 1 tablet by mouth every 8 hours as needed for Dizziness  FLUoxetine (PROZAC) 40 MG capsule, Take 1 capsule by mouth daily  polyvinyl alcohol (LIQUIFILM TEARS) 1.4 % ophthalmic solution, Place 1 drop into both eyes 3 times daily  polyvinyl alcohol (LIQUIFILM TEARS) 1.4 % ophthalmic solution, Place 1 drop into both eyes every 8 hours as needed for Dry Eyes  meclizine (ANTIVERT) 12.5 MG tablet, Take 1 tablet by mouth 3 times daily  pantoprazole (PROTONIX) 40 MG tablet, Take 1 tablet by mouth daily as needed (reflux)  fentaNYL (DURAGESIC) 12 MCG/HR, Place 1 patch onto the skin every 72 hours. Last Patch Applied: 8/18/2024 @ 1330 Next Patch Due: 8/21/2024  temazepam (RESTORIL) 15 MG capsule, Take 1 capsule by mouth nightly.  DULoxetine (CYMBALTA) 30 MG extended release capsule, Take 1 capsule by mouth Daily with lunch  acetaminophen (TYLENOL) 650 MG extended release tablet, Take 2 tablets by mouth every 8 hours as needed for Pain  traMADol (ULTRAM) 50 MG tablet, Take 1  Specimen: Blood Updated: 08/20/24 1832     Specimen Description .BLOOD     Special Requests          Culture NO GROWTH 2 DAYS    Culture, Blood 2 [[id number]]  (Abnormal) Collected: 08/18/24 1526    Order Status: Completed Specimen: Blood Updated: 08/20/24 1147     Specimen Description .BLOOD     Special Requests Site: Blood     [facility] test comment AEROBIC BLD BOTTLE     Direct Exam DIRECT GRAM STAIN FROM BOTTLE: GRAM POSITIVE COCCI IN CLUSTERS     Comment: Direct Gram Stain from bottle result called to and read back by: VICKI BRYANT RN   8/19/2024 AT 1407           Culture STAPHYLOCOCCUS SPECIES, COAGULASE NEGATIVE A single positive blood culture of coagulase negative Staphylocci, diphtheroids,micrococci, Cutibacterium, viridans Streptocci, Bacillus, or Lactobacillus species should be interpreted with caution and viewed as a likely skin contaminant.       Candida auris Not Detected     Candida albicans Not Detected     Candida glabrata Not Detected     Candida krusei Not Detected     Candida parapsilosis Not Detected     Candida tropicalis Not Detected     Cryptococcus neoformans/gattii Not Detected     Acinetobacter calcoaceticus-baumannii complex Not Detected     Escherichia Coli Not Detected     Enterobacter Cloacae Complex Not Detected     Enterobacterales Not Detected     Klebsiella oxytoca Not Detected     Klebsiella aerogenes Not Detected     Klebsiella pneumoniae group Not Detected     Pseudomonas aeruginosa Not Detected     Proteus spp Not Detected     Salmonella species Not Detected     SERRATIA MARCESCENS Not Detected     Stenotrophomonas maltophilia Not Detected     Haemophilus influenzae Not Detected     Listeria monocytogenes Not Detected     Bacteroides fragilis Not Detected     Neisseria Meningitidis, NMNI Not Detected     Staphylococcus spp DETECTED     Staphylococcus Aureus Not Detected     Staphylococcus epidermidis DETECTED     Staphylococcus lugdunensis Not Detected     Streptococcus spp  Not Detected     Enterococcus faecalis Not Detected     Enterococcus faecium Not Detected     Streptococcus agalactiae (Group B) Not Detected     Streptococcus pneumoniae Not Detected     Streptococcus pyogenes Not Detected     Methicillin Resistance mecA/C  by PCR Not Detected                  FINAL IMPRESSION    Patient is a 84 y.o. female who presented with   Chief Complaint   Patient presents with    Back Pain     Pt complaint of lower back pain.    and admitted for Altered mental status, unspecified altered mental status type [R41.82]  Pneumonia due to infectious organism, unspecified laterality, unspecified part of lung [J18.9]  AMS (altered mental status) [R41.82]  Altered mental status [R41.82]  Staph bacteremia PROB CONTAMINANT CHECK ESR/CRP/PROCAL   TO FINISH RX FOR PNEUMONIA      cefTRIAXone (ROCEPHIN) 2,000 mg in sterile water 20 mL IV syringe, Q24H  doxycycline (VIBRAMYCIN) 100 mg in sodium chloride 0.9 % 100 mL IVPB ([id number]), BID          Infection Control Recommendations   [place] Precautions  Borges Catheter +        Coordination of Outpatient Care:   Estimated Length of  antimicrobials: 5-7 DAYS  Patient will NOT need Midline Catheter Insertion or  PICC line Insertion:      Patient will NOT need outpatient wound care:     Available labs, imaging studies, microbiologic studies have been reviewed.     The patient/FAMILY  was educated about the diagnosis, prognosis, indications, risks and benefits of treatment.      An opportunity to ask questions was given to the patient/FAMILY and questions were answered.      Thank you for involving me in the care of Charity Simpson. Please do not hesitate to call [phone]  for any questions or concerns.         Electronically signed by Rosaura Covarrubias MD on 8/21/2024 at 3:37 PM

## 2024-08-21 NOTE — PLAN OF CARE
Problem: Discharge Planning  Goal: Discharge to home or other facility with appropriate resources  8/20/2024 2340 by Daphne Castro RN  Outcome: Progressing  8/20/2024 1605 by Sonia Adames RN  Outcome: Progressing  Flowsheets (Taken 8/20/2024 0830)  Discharge to home or other facility with appropriate resources: Identify barriers to discharge with patient and caregiver     Problem: Pain  Goal: Verbalizes/displays adequate comfort level or baseline comfort level  8/20/2024 2340 by Daphne Castro RN  Outcome: Progressing  Flowsheets (Taken 8/20/2024 1615 by Sonia Adames RN)  Verbalizes/displays adequate comfort level or baseline comfort level: Encourage patient to monitor pain and request assistance  8/20/2024 1605 by Sonia Adames RN  Outcome: Progressing  Flowsheets (Taken 8/20/2024 1605)  Verbalizes/displays adequate comfort level or baseline comfort level:   Encourage patient to monitor pain and request assistance   Assess pain using appropriate pain scale   Administer analgesics based on type and severity of pain and evaluate response     Problem: ABCDS Injury Assessment  Goal: Absence of physical injury  8/20/2024 2340 by Daphne Castro RN  Outcome: Progressing  8/20/2024 1605 by Sonia Adames RN  Outcome: Progressing  Flowsheets (Taken 8/19/2024 1032)  Absence of Physical Injury: Implement safety measures based on patient assessment     Problem: Safety - Adult  Goal: Free from fall injury  8/20/2024 2340 by Daphne Castro RN  Outcome: Progressing  8/20/2024 1605 by Sonia Adames RN  Outcome: Progressing  Flowsheets (Taken 8/19/2024 1032)  Free From Fall Injury: Instruct family/caregiver on patient safety     Problem: Skin/Tissue Integrity  Goal: Absence of new skin breakdown  Description: 1.  Monitor for areas of redness and/or skin breakdown  2.  Assess vascular access sites hourly  3.  Every 4-6 hours minimum:  Change oxygen saturation probe site  4.  Every 4-6  hours:  If on nasal continuous positive airway pressure, respiratory therapy assess nares and determine need for appliance change or resting period.  8/20/2024 2340 by Daphne Castro RN  Outcome: Progressing  8/20/2024 1605 by Sonia Adames RN  Outcome: Progressing     Problem: Nutrition Deficit:  Goal: Optimize nutritional status  8/20/2024 2340 by Daphne Castro, RN  Outcome: Progressing  8/20/2024 1605 by Sonia Adames RN  Outcome: Progressing  Flowsheets (Taken 8/20/2024 1605)  Nutrient intake appropriate for improving, restoring, or maintaining nutritional needs:   Assess nutritional status and recommend course of action   Monitor oral intake, labs, and treatment plans   Recommend appropriate diets, oral nutritional supplements, and vitamin/mineral supplements

## 2024-08-21 NOTE — PROGRESS NOTES
turning from your back to your side while in a flat bed without using bedrails?: A Little  How much help is needed moving from lying on your back to sitting on the side of a flat bed without using bedrails?: A Little  How much help is needed moving to and from a bed to a chair?: A Lot  How much help is needed standing up from a chair using your arms?: A Lot  How much help is needed walking in hospital room?: Total  How much help is needed climbing 3-5 steps with a railing?: Total  AM-PAC Inpatient Mobility Raw Score : 12  AM-PAC Inpatient T-Scale Score : 35.33  Mobility Inpatient CMS 0-100% Score: 68.66  Mobility Inpatient CMS G-Code Modifier : CL    Nursing cleared patient for PT treatment.      OBJECTIVE:   Initial Evaluation  Date: 8/19/2024 Treatment Date:  8/23/2024     Short Term/ Long Term   Goals   Was pt agreeable to Eval/treatment? Yes Y To be met in 4 days   Pain level   0/10   7/10 low back    Bed Mobility  Using rails and head of bed elevated:     Rolling: Maximal assist of 1    Supine to sit: Maximal assist of 1    Sit to supine: Maximal assist of 1    Scooting: Maximal assist of 1   Using rails and head of bed elevated:     Rolling: Minimal assist of 1   Supine to sit: Not assessed patient in chair   Sit to supine: Minimal assist of 1   Scooting: Minimal assist of 1    Rolling: Independent    Supine to sit: Independent    Sit to supine: Independent    Scooting: Independent     Transfers Sit to stand: Moderate assist of  2  Sit to stand: Maximal assist of 1 stand pivot   Sit to stand: Moderate assist of 1    Ambulation     2 steps using  hand held assist with Moderate assist of  2   for balance, upright, weight shift, and safety not assessed N/A     2 feet using  wheeled walker with Moderate assist of 1    ROM Within functional limits    Increase range of motion 10% of affected joints    Strength BUE:  refer to OT eval  RLE:  3/5  LLE:  3/5  Increase strength in affected mm groups by 1/3 grade    Balance Sitting EOB:  fair min A for tremors and balance   Dynamic Standing:  poor HHA, mod x2 Sitting EOB: fair   Dynamic Standing: poor +    Sitting EOB:  good -  Dynamic Standing: fair wheeled walker       Patient is Alert & Oriented x person, place, time, and situation (did not know the year but knew it was August) and follows directions    Sensation:  Patient  reports numbness bilateral lower extremities  , feet feel like they are burning   Edema:  no   Endurance: fair      Vitals: room air   Blood Pressure at rest  Blood Pressure during session    Heart Rate at rest  Heart Rate during session    SPO2 at rest %  SPO2 during session %     Patient education  Patient educated on role of Physical Therapy, risks of immobility, safety and plan of care,  importance of mobility while in hospital , ankle pumps, quad set and glut set for edema control, blood clot prevention, importance and purpose of adaptive device and adjusted to proper height for the patient., and safety      Patient response to education:   Pt verbalized understanding Pt demonstrated skill Pt requires further education in this area   Yes Partial Yes      Treatment:  Patient practiced and was instructed/facilitated in the following treatment: Patient assisted to EOB. Sat edge of bed 15 minutes with Minimal assist of 1 to increase dynamic sitting balance and activity tolerance. Seated exercises, bed mobility     Therapeutic Exercises:  ankle pumps, heel raises, hip abduction/adduction, long arc quad, and seated marching  x 10-15 reps.     At end of session, patient in bed with  call light and phone within reach,  all lines and tubes intact, nursing notified.      Patient would benefit from continued skilled Physical Therapy to improve functional independence and quality of life.         Patient's/ family goals   Return to Assisted Living Facility    Time in    1331  Time out  1358    Total Treatment Time  27 minutes      CPT codes:  Therapeutic  activities (26765)   17 minutes  1 unit(s)  Therapeutic exercises (54657)   10 minutes  1 unit(s)    Kenn Quispe, PT

## 2024-08-21 NOTE — CARE COORDINATION
SOCIAL WORK / DISCHARGE PLANNING:  Dc plan to [facility], [place] [phone], Fax [phone] skilled. NO PRECERT needed. Bed available starting today. Borges placed. IV Doxy/IV Rocephin, if will need at SNF will need IV access placed. IRIS will need signed by physician. HENS form initiated, will need completed prior to dc     Addendum: 130pm. Discussed case with CHAD Maciel+, consulted ID, await consult. Sw will follow.         Electronically signed by MAURILIO Woods on 8/21/2024 at 10:34 AM

## 2024-08-21 NOTE — PROGRESS NOTES
SPIRITUAL HEALTH SERVICES - [facility] Encounter    Name: Charity Simpson                  Referral: Routine Visit    Sacraments  Anointed (Last Rites): No  Apostolic Foss: No  Confession: No  Communion: Yes     Assessment:  Patient receptive to  visit.      Intervention:   provided spiritual support and sacramental ministry for patient.     Outcome:  Patient expressed gratitude for visit.    Plan:  Chaplains will remain available to offer spiritual and emotional support as needed.      Electronically signed by Chaplain Lizette, on 8/21/2024 at 4:57 PM.  Spiritual Care Department  [facility]  [phone]    Cardiology Follow up    SHANNON WEBSTER   54y Male  PAST MEDICAL & SURGICAL HISTORY:  No pertinent past medical history       HPI:  This patient is 55 y/o male with past medical history of HTN not on any medication. He is here after cardiac arrest. At field patient was complaining of chest pain before he collapsed. CPR was started, remained in asystole for 6 minutes before ROSC was achieved. Rhythm was noted to be ST elevation in anterolateral leads after ROSC. In ED patient was unresponsive with absent corneal reflexes but preserved pupillary reflex. Patient was intubated in ED. Repeat EKG showed no ST elevation. No shock was given. Patient seen by cardiology and plan for urgent cath was made. 2 stents were placed in LAD.   Family reports that patient has been complaining of chest pain for few weeks. (18 Feb 2020 14:08)    Allergies    No Known Allergies    Pt seen and examined, Remains intubated/on ventilator  No events on telemetry overnight    Vital Signs Last 24 Hrs  T(C): 37 (20 Feb 2020 08:01), Max: 37.1 (20 Feb 2020 06:00)  T(F): 98.6 (20 Feb 2020 08:01), Max: 98.8 (20 Feb 2020 06:00)  HR: 104 (20 Feb 2020 08:16) (82 - 120)  BP: 105/71 (20 Feb 2020 08:01) (92/68 - 114/67)  BP(mean): 79 (20 Feb 2020 08:01) (78 - 100)  RR: 20 (20 Feb 2020 08:01) (13 - 26)  SpO2: 100% (20 Feb 2020 08:16) (96% - 100%)    MEDICATIONS  (STANDING):  aspirin  chewable 81 milliGRAM(s) Oral daily  atorvastatin 80 milliGRAM(s) Oral at bedtime  chlorhexidine 0.12% Liquid 15 milliLiter(s) Oral Mucosa two times a day  chlorhexidine 4% Liquid 1 Application(s) Topical <User Schedule>  clopidogrel Tablet 75 milliGRAM(s) Oral daily  fentaNYL   Infusion. 0.5 MICROgram(s)/kG/Hr (4.95 mL/Hr) IV Continuous <Continuous>  heparin  Injectable 5000 Unit(s) SubCutaneous every 12 hours  lactated ringers Bolus 250 milliLiter(s) IV Bolus once  lactated ringers. 1000 milliLiter(s) (75 mL/Hr) IV Continuous <Continuous>  levETIRAcetam  IVPB 1000 milliGRAM(s) IV Intermittent every 12 hours  metoprolol tartrate 12.5 milliGRAM(s) Enteral Tube two times a day  midazolam Infusion 0.02 mG/kG/Hr (2 mL/Hr) IV Continuous <Continuous>  pantoprazole  Injectable 40 milliGRAM(s) IV Push daily    MEDICATIONS  (PRN):      PHYSICAL EXAM:           CONSTITUTIONAL: Intubated and on ventilator, responsive to painful stimuli  	SKIN: warm, dry  	HEAD: Normocephalic; atraumatic  	EYES:  pupils pinpoint and sluggish  	ENT: No nasal discharge, airway clear, mucous membranes moist  	NECK: Supple; non tender.  	CARD: +S1, +S2, no murmurs, gallops, or rubs. Regular rate and rhythm    	RESP: No wheezes, rales or rhonchi. CTA B/L  	ABD: soft ntnd, + BS x 4 quadrants  	EXT: moves all extremities,  no clubbing, cyanosis or edema  	NEURO: Alert and oriented x3, no focal deficits          PSYCH: Cooperative, appropriate          VASCULAR:  +2 Rad / +2 PTs / + 2 DPs          EXTREMITY:             Right Groin:  Access site soft, no hematoma, no pain, + pulses/same as baseline, no sign of infection, no numbness  	            ECG:            Sinus Tach, rate 107, T wave abnormality           QT//480                                                                                                    2D ECHO:  < from: Transthoracic Echocardiogram (02.18.20 @ 17:26) >     EXAM:  2-D ECHO (TTE) COMPLETE        PROCEDURE DATE:  02/18/2020      INTERPRETATION:  REPORT:    TRANSTHORACIC ECHOCARDIOGRAM REPORT         Patient Name:   SHANNON WEBSTER Accession #: [id number]  [facility] Rec #:  [id number]        Height:      69.0 in 175.3 cm  YOB: 1965        Weight:      220.5 lb 100.00 kg  Patient Age:    54 years         BSA:         2.15 m²  Patient Gender: M                BP:          82/63 mmHg       Date of Exam:        2/18/2020 5:26:01 PM  Referring Physician: [id number] CHRISTY SARMIENTO  Sonographer:         Radha Sutton  Fellow:              Linus Duran     Reading Physician:   Manuel Batres M.D.    Procedure:   2D Echo/Doppler/Color Doppler Complete.  Indications: R07.9 - Chest Pain, unspecified  Diagnosis:   Chest pain, unspecified - R07.9         Summary:   1. Left ventricular ejection fraction, by visual estimation, is 20 to 25%.   2. Severely decreased global left ventricular systolic function.   3. Mildly increased LV wall thickness.   4. Normal left ventricular internal cavity size.   5. Severe global hypokinesia noted.   6. No evidence of mitral valve regurgitation.   7. Structurally normal mitral valve, with normal leaflet excursion.   8. Sclerotic aortic valve with decreased opening.   9. Mildly reduced AV opening. No AS on limited AV doppler.    PHYSICIAN INTERPRETATION:  Left Ventricle: The left ventricular internal cavity size is normal. Left ventricular wall thickness is mildly increased. Global LV systolic function was severely decreased. Left ventricular ejection fraction, by visual estimation, is 20 to 25%. Severe global hypokinesia noted.  Right Ventricle: Normal right ventricular size and function.  Left Atrium: Normal left atrial size.  Right Atrium: Normal right atrial size.  Pericardium: There is no evidence of pericardial effusion.  Mitral Valve: Structurally normal mitral valve, with normal leaflet excursion. The mitral valve is normal in structure. No evidence of mitral valve regurgitation is seen.  Tricuspid Valve: Structurally normal tricuspid valve, with normal leaflet excursion. The tricuspid valve is normal in structure. Mild tricuspid regurgitation is visualized.  Aortic Valve: Sclerotic aortic valve with decreased opening. No evidence of aortic valve regurgitation is seen. Mildly reduced AV opening. No AS on limited AV doppler.  Pulmonic Valve: Structurally normal pulmonic valve, with normal leaflet excursion. The pulmonic valve is normal. Mild pulmonic valve regurgitation.  Aorta: Theaortic root and ascending aorta are structurally normal, with no evidence of dilitation.  Pulmonary Artery: The main pulmonary artery is normal in size.  Venous: The inferior vena cava was normal sized, with respiratory size variation less than 50%.       2D AND M-MODE MEASUREMENTS (normal ranges within parentheses):  Left                  Normal   Aorta/Left             Normal  Ventricle:                     Atrium:  IVSd (2D):  1.32 cm  (0.7-1.1) AoV Cusp       2.11  (1.5-2.6)  LVPWd       1.27cm  (0.7-1.1) Separation:     cm  (2D):                          Left Atrium    2.82  (1.9-4.0)  LVIDd       4.66 cm  (3.4-5.7) (Mmode):        cm  (2D):                          Right  LVIDs       3.79 cm            Ventricle:  (2D):         RVd (Mmode):   1.99 cm  LV FS       18.7 %    (>25%)   RVd (2D):      2.68 cm  (2D):  IVSd        0.81 cm  (0.7-1.1)  (Mmode):  LVPWd       0.97 cm  (0.7-1.1)  (Mmode):  LVIDd       4.74 cm  (3.4-5.7)  (Mmode):  LVIDs       3.92 cm  (Mmode):  LV FS       17.2 %    (>25%)  (Mmode):  Relative     0.55     (<0.42)  Wall  Thickness  Rel. Wall    0.41     (<0.42)  Thickness  Mm  LV Mass    66.2 g/m²  Index:  Mmode    SPECTRAL DOPPLER ANALYSIS:  LV DIASTOLIC FUNCTION:  MV Peak E: 0.31 m/s Decel Time: 90 msec  MV Peak A: 0.48 m/s  E/A Ratio: 0.65    Aortic Valve:  AoV VMax:    1.04 m/s  AoV Pk Grad: 4.4 mmHg    LVOT Diam: 2.10 cm    Mitral Valve:  MV P1/2 Time: 26.01 msec  MV Area, PHT: 8.46 cm²    Tricuspid Valve and PA/RV Systolic Pressure: TR Max Velocity: 1.77 m/s RA Pressure: 10 mmHg RVSP/PASP: 22.5 mmHg         LABS:                        14.0   13.73 )-----------( 155      ( 20 Feb 2020 04:13 )             41.5     02-20    141  |  101  |  43<H>  ----------------------------<  139<H>  4.6   |  22  |  1.4    Ca    9.2      20 Feb 2020 04:13  Phos  4.4     02-20  Mg     2.3     02-20    TPro  7.0  /  Alb  4.2  /  TBili  0.7  /  DBili  0.2  /  AST  106<H>  /  ALT  190<H>  /  AlkPhos  63  02-20    CARDIAC MARKERS ( 19 Feb 2020 05:58 )  x     / 0.42 ng/mL / x     / x     / x      CARDIAC MARKERS ( 19 Feb 2020 00:20 )  x     / 0.60 ng/mL / 1019 U/L / x     / 76.0 ng/mL  CARDIAC MARKERS ( 18 Feb 2020 22:15 )  x     / 0.75 ng/mL / x     / x     / x      CARDIAC MARKERS ( 18 Feb 2020 13:20 )  x     / 0.01 ng/mL / x     / x     / x          Magnesium, Serum: 2.3 mg/dL [1.8 - 2.4] (02-20-20 @ 04:13)  Magnesium, Serum: 2.3 mg/dL [1.8 - 2.4] (02-19-20 @ 21:05)  LIVER FUNCTIONS - ( 20 Feb 2020 04:13 )  Alb: 4.2 g/dL / Pro: 7.0 g/dL / ALK PHOS: 63 U/L / ALT: 190 U/L / AST: 106 U/L / GGT: x             A/P:  I discussed the case with Cardiologist Dr. Sandoval and recommend the following:    S/P PCI: BALJINDER x 2 pLAD, Anoxic Encephalopathy, Code ICE                     Daily ekg                   DVT / GI prophylaxis                    HOB > 45 degrees                    Monitor Cr, LFT's                   Strict I / O                   Keep K = 4, Mg = 2                   Daily sedation vacation                    Keep MAP > 65                   monitor Cardiac Cath access site                   Follow neuro recommendations                    Continue DAPT ( Aspirin 81 mg daily and Plavix 75 mg daily ), B-Blocker, Statin Therapy                   Continue CCU monitoring Cardiology Follow up    SHANNON WEBSTER   54y Male  PAST MEDICAL & SURGICAL HISTORY:  No pertinent past medical history       HPI:  This patient is 55 y/o male with past medical history of HTN not on any medication. He is here after cardiac arrest. At field patient was complaining of chest pain before he collapsed. CPR was started, remained in asystole for 6 minutes before ROSC was achieved. Rhythm was noted to be ST elevation in anterolateral leads after ROSC. In ED patient was unresponsive with absent corneal reflexes but preserved pupillary reflex. Patient was intubated in ED. Repeat EKG showed no ST elevation. No shock was given. Patient seen by cardiology and plan for urgent cath was made. 2 stents were placed in LAD.   Family reports that patient has been complaining of chest pain for few weeks. (18 Feb 2020 14:08)    Allergies    No Known Allergies    Pt seen and examined, Remains intubated/on ventilator  No events on telemetry overnight    Vital Signs Last 24 Hrs  T(C): 37 (20 Feb 2020 08:01), Max: 37.1 (20 Feb 2020 06:00)  T(F): 98.6 (20 Feb 2020 08:01), Max: 98.8 (20 Feb 2020 06:00)  HR: 104 (20 Feb 2020 08:16) (82 - 120)  BP: 105/71 (20 Feb 2020 08:01) (92/68 - 114/67)  BP(mean): 79 (20 Feb 2020 08:01) (78 - 100)  RR: 20 (20 Feb 2020 08:01) (13 - 26)  SpO2: 100% (20 Feb 2020 08:16) (96% - 100%)    MEDICATIONS  (STANDING):  aspirin  chewable 81 milliGRAM(s) Oral daily  atorvastatin 80 milliGRAM(s) Oral at bedtime  chlorhexidine 0.12% Liquid 15 milliLiter(s) Oral Mucosa two times a day  chlorhexidine 4% Liquid 1 Application(s) Topical <User Schedule>  clopidogrel Tablet 75 milliGRAM(s) Oral daily  fentaNYL   Infusion. 0.5 MICROgram(s)/kG/Hr (4.95 mL/Hr) IV Continuous <Continuous>  heparin  Injectable 5000 Unit(s) SubCutaneous every 12 hours  lactated ringers Bolus 250 milliLiter(s) IV Bolus once  lactated ringers. 1000 milliLiter(s) (75 mL/Hr) IV Continuous <Continuous>  levETIRAcetam  IVPB 1000 milliGRAM(s) IV Intermittent every 12 hours  metoprolol tartrate 12.5 milliGRAM(s) Enteral Tube two times a day  midazolam Infusion 0.02 mG/kG/Hr (2 mL/Hr) IV Continuous <Continuous>  pantoprazole  Injectable 40 milliGRAM(s) IV Push daily    MEDICATIONS  (PRN):      PHYSICAL EXAM:           CONSTITUTIONAL: Intubated and on ventilator, responsive to painful stimuli  	SKIN: warm, dry  	HEAD: Normocephalic; atraumatic  	EYES:  pupils pinpoint and sluggish  	ENT: No nasal discharge, airway clear, mucous membranes moist  	NECK: Supple; non tender.  	CARD: +S1, +S2, no murmurs, gallops, or rubs. Regular rate and rhythm    	RESP: No wheezes, rales or rhonchi. CTA B/L  	ABD: soft ntnd, + BS x 4 quadrants  	EXT: moves all extremities,  no clubbing, cyanosis or edema  	NEURO: Alert and oriented x3, no focal deficits          PSYCH: Cooperative, appropriate          VASCULAR:  +2 Rad / +2 PTs / + 2 DPs          EXTREMITY:             Right Groin:  Access site soft, no hematoma, no pain, + pulses/same as baseline, no sign of infection, no numbness  	            ECG:         < from: 12 Lead ECG (02.20.20 @ 07:21) >    Ventricular Rate 107 BPM    Atrial Rate 107 BPM    P-R Interval 130 ms    QRS Duration 90 ms    Q-T Interval 360 ms    QTC Calculation(Bezet) 480 ms    P Axis 24 degrees    R Axis 5 degrees    T Axis 163 degrees    Diagnosis Line Sinus tachycardia  Cannot rule out Inferior infarct , age undetermined  Cannot rule out Anteroseptal infarct , age undetermined  T wave abnormality, consider lateral ischemia  Abnormal ECG    Confirmed by Demarcus Montenegro ([id number]) on 2/20/2020 12:27:48 PM                                                                                                 2D ECHO:  < from: Transthoracic Echocardiogram (02.18.20 @ 17:26) >     EXAM:  2-D ECHO (TTE) COMPLETE        PROCEDURE DATE:  02/18/2020      INTERPRETATION:  REPORT:    TRANSTHORACIC ECHOCARDIOGRAM REPORT         Patient Name:   SHANNON WEBSTER Accession #: [id number]  Medical Rec #:  [id number]        Height:      69.0 in 175.3 cm  YOB: 1965        Weight:      220.5 lb 100.00 kg  Patient Age:    54 years         BSA:         2.15 m²  Patient Gender: M                BP:          82/63 mmHg       Date of Exam:        2/18/2020 5:26:01 PM  Referring Physician: [id number] CHRISTY SARMIENTO  Sonographer:         Radha Sutton  Fellow:              Linus Duran     Reading Physician:   Manuel Batres M.D.    Procedure:   2D Echo/Doppler/Color Doppler Complete.  Indications: R07.9 - Chest Pain, unspecified  Diagnosis:   Chest pain, unspecified - R07.9     Summary:   1. Left ventricular ejection fraction, by visual estimation, is 20 to 25%.   2. Severely decreased global left ventricular systolic function.   3. Mildly increased LV wall thickness.   4. Normal left ventricular internal cavity size.   5. Severe global hypokinesia noted.   6. No evidence of mitral valve regurgitation.   7. Structurally normal mitral valve, with normal leaflet excursion.   8. Sclerotic aortic valve with decreased opening.   9. Mildly reduced AV opening. No AS on limited AV doppler.    PHYSICIAN INTERPRETATION:  Left Ventricle: The left ventricular internal cavity size is normal. Left ventricular wall thickness is mildly increased. Global LV systolic function was severely decreased. Left ventricular ejection fraction, by visual estimation, is 20 to 25%. Severe global hypokinesia noted.  Right Ventricle: Normal right ventricular size and function.  Left Atrium: Normal left atrial size.  Right Atrium: Normal right atrial size.  Pericardium: There is no evidence of pericardial effusion.  Mitral Valve: Structurally normal mitral valve, with normal leaflet excursion. The mitral valve is normal in structure. No evidence of mitral valve regurgitation is seen.  Tricuspid Valve: Structurally normal tricuspid valve, with normal leaflet excursion. The tricuspid valve is normal in structure. Mild tricuspid regurgitation is visualized.  Aortic Valve: Sclerotic aortic valve with decreased opening. No evidence of aortic valve regurgitation is seen. Mildly reduced AV opening. No AS on limited AV doppler.  Pulmonic Valve: Structurally normal pulmonic valve, with normal leaflet excursion. The pulmonic valve is normal. Mild pulmonic valve regurgitation.  Aorta: Theaortic root and ascending aorta are structurally normal, with no evidence of dilitation.  Pulmonary Artery: The main pulmonary artery is normal in size.  Venous: The inferior vena cava was normal sized, with respiratory size variation less than 50%.       2D AND M-MODE MEASUREMENTS (normal ranges within parentheses):  Left                  Normal   Aorta/Left             Normal  Ventricle:                     Atrium:  IVSd (2D):  1.32 cm  (0.7-1.1) AoV Cusp       2.11  (1.5-2.6)  LVPWd       1.27cm  (0.7-1.1) Separation:     cm  (2D):                          Left Atrium    2.82  (1.9-4.0)  LVIDd       4.66 cm  (3.4-5.7) (Mmode):        cm  (2D):                          Right  LVIDs       3.79 cm            Ventricle:  (2D):         RVd (Mmode):   1.99 cm  LV FS       18.7 %    (>25%)   RVd (2D):      2.68 cm  (2D):  IVSd        0.81 cm  (0.7-1.1)  (Mmode):  LVPWd       0.97 cm  (0.7-1.1)  (Mmode):  LVIDd       4.74 cm  (3.4-5.7)  (Mmode):  LVIDs       3.92 cm  (Mmode):  LV FS       17.2 %    (>25%)  (Mmode):  Relative     0.55     (<0.42)  Wall  Thickness  Rel. Wall    0.41     (<0.42)  Thickness  Mm  LV Mass    66.2 g/m²  Index:  Mmode    SPECTRAL DOPPLER ANALYSIS:  LV DIASTOLIC FUNCTION:  MV Peak E: 0.31 m/s Decel Time: 90 msec  MV Peak A: 0.48 m/s  E/A Ratio: 0.65    Aortic Valve:  AoV VMax:    1.04 m/s  AoV Pk Grad: 4.4 mmHg    LVOT Diam: 2.10 cm    Mitral Valve:  MV P1/2 Time: 26.01 msec  MV Area, PHT: 8.46 cm²    Tricuspid Valve and PA/RV Systolic Pressure: TR Max Velocity: 1.77 m/s RA Pressure: 10 mmHg RVSP/PASP: 22.5 mmHg     LABS:                        14.0   13.73 )-----------( 155      ( 20 Feb 2020 04:13 )             41.5     02-20    141  |  101  |  43<H>  ----------------------------<  139<H>  4.6   |  22  |  1.4    Ca    9.2      20 Feb 2020 04:13  Phos  4.4     02-20  Mg     2.3     02-20    TPro  7.0  /  Alb  4.2  /  TBili  0.7  /  DBili  0.2  /  AST  106<H>  /  ALT  190<H>  /  AlkPhos  63  02-20    CARDIAC MARKERS ( 19 Feb 2020 05:58 )  x     / 0.42 ng/mL / x     / x     / x      CARDIAC MARKERS ( 19 Feb 2020 00:20 )  x     / 0.60 ng/mL / 1019 U/L / x     / 76.0 ng/mL  CARDIAC MARKERS ( 18 Feb 2020 22:15 )  x     / 0.75 ng/mL / x     / x     / x      CARDIAC MARKERS ( 18 Feb 2020 13:20 )  x     / 0.01 ng/mL / x     / x     / x          Magnesium, Serum: 2.3 mg/dL [1.8 - 2.4] (02-20-20 @ 04:13)  Magnesium, Serum: 2.3 mg/dL [1.8 - 2.4] (02-19-20 @ 21:05)  LIVER FUNCTIONS - ( 20 Feb 2020 04:13 )  Alb: 4.2 g/dL / Pro: 7.0 g/dL / ALK PHOS: 63 U/L / ALT: 190 U/L / AST: 106 U/L / GGT: x             A/P:  I discussed the case with Cardiologist Dr. Sandoval and recommend the following:    S/P PCI: BALJINDER x 2 pLAD, Anoxic Encephalopathy, Code ICE                     Daily ekg                   May increase metropolol dosing as tolerated by B/P and HR                   Continue with IVF                   DVT / GI prophylaxis                    HOB > 45 degrees                    Monitor Cr, LFT's                   Strict I / O                   Keep K = 4, Mg = 2                   Daily sedation vacation                    Keep MAP > 65                   monitor Cardiac Cath access site                   Follow neuro recommendations                    Continue DAPT ( Aspirin 81 mg daily and Plavix 75 mg daily ), B-Blocker, Statin Therapy                   Continue CCU monitoring Cardiology Follow up    SHANNON WEBSTER   54y Male  PAST MEDICAL & SURGICAL HISTORY:  No pertinent past medical history       HPI:  This patient is 53 y/o male with past medical history of HTN not on any medication. He is here after cardiac arrest. At field patient was complaining of chest pain before he collapsed. CPR was started, remained in asystole for 6 minutes before ROSC was achieved. Rhythm was noted to be ST elevation in anterolateral leads after ROSC. In ED patient was unresponsive with absent corneal reflexes but preserved pupillary reflex. Patient was intubated in ED. Repeat EKG showed no ST elevation. No shock was given. Patient seen by cardiology and plan for urgent cath was made. 2 stents were placed in LAD.   Family reports that patient has been complaining of chest pain for few weeks. (18 Feb 2020 14:08)    Allergies    No Known Allergies    Pt seen and examined, Remains intubated/on ventilator  No events on telemetry overnight    Vital Signs Last 24 Hrs  T(C): 37 (20 Feb 2020 08:01), Max: 37.1 (20 Feb 2020 06:00)  T(F): 98.6 (20 Feb 2020 08:01), Max: 98.8 (20 Feb 2020 06:00)  HR: 104 (20 Feb 2020 08:16) (82 - 120)  BP: 105/71 (20 Feb 2020 08:01) (92/68 - 114/67)  BP(mean): 79 (20 Feb 2020 08:01) (78 - 100)  RR: 20 (20 Feb 2020 08:01) (13 - 26)  SpO2: 100% (20 Feb 2020 08:16) (96% - 100%)    MEDICATIONS  (STANDING):  aspirin  chewable 81 milliGRAM(s) Oral daily  atorvastatin 80 milliGRAM(s) Oral at bedtime  chlorhexidine 0.12% Liquid 15 milliLiter(s) Oral Mucosa two times a day  chlorhexidine 4% Liquid 1 Application(s) Topical <User Schedule>  clopidogrel Tablet 75 milliGRAM(s) Oral daily  fentaNYL   Infusion. 0.5 MICROgram(s)/kG/Hr (4.95 mL/Hr) IV Continuous <Continuous>  heparin  Injectable 5000 Unit(s) SubCutaneous every 12 hours  lactated ringers Bolus 250 milliLiter(s) IV Bolus once  lactated ringers. 1000 milliLiter(s) (75 mL/Hr) IV Continuous <Continuous>  levETIRAcetam  IVPB 1000 milliGRAM(s) IV Intermittent every 12 hours  metoprolol tartrate 12.5 milliGRAM(s) Enteral Tube two times a day  midazolam Infusion 0.02 mG/kG/Hr (2 mL/Hr) IV Continuous <Continuous>  pantoprazole  Injectable 40 milliGRAM(s) IV Push daily    MEDICATIONS  (PRN):      PHYSICAL EXAM:           CONSTITUTIONAL: Intubated and on ventilator, responsive to painful stimuli  	SKIN: warm, dry  	HEAD: Normocephalic; atraumatic  	EYES:  pupils pinpoint and sluggish  	ENT: No nasal discharge, airway clear, mucous membranes moist  	NECK: Supple; non tender.  	CARD: +S1, +S2, no murmurs, gallops, or rubs. Regular rate and rhythm    	RESP: No wheezes, rales or rhonchi. CTA B/L  	ABD: soft ntnd, + BS x 4 quadrants  	EXT: moves all extremities,  no clubbing, cyanosis or edema  	NEURO: Alert and oriented x3, no focal deficits          PSYCH: Cooperative, appropriate          VASCULAR:  +2 Rad / +2 PTs / + 2 DPs          EXTREMITY:             Right Groin:  Access site soft, no hematoma, no pain, + pulses/same as baseline, no sign of infection, no numbness  	            ECG:         < from: 12 Lead ECG (02.20.20 @ 07:21) >    Ventricular Rate 107 BPM    Atrial Rate 107 BPM    P-R Interval 130 ms    QRS Duration 90 ms    Q-T Interval 360 ms    QTC Calculation(Bezet) 480 ms    P Axis 24 degrees    R Axis 5 degrees    T Axis 163 degrees    Diagnosis Line Sinus tachycardia  Cannot rule out Inferior infarct , age undetermined  Cannot rule out Anteroseptal infarct , age undetermined  T wave abnormality, consider lateral ischemia  Abnormal ECG    Confirmed by Demarcus Montenegro [id number]) on 2/20/2020 12:27:48 PM                                                                                                 2D ECHO:  < from: Transthoracic Echocardiogram (02.18.20 @ 17:26) >     EXAM:  2-D ECHO (TTE) COMPLETE        PROCEDURE DATE:  02/18/2020      INTERPRETATION:  REPORT:    TRANSTHORACIC ECHOCARDIOGRAM REPORT         Patient Name:   SHANNON WEBSTER Accession #: [id number]  Medical Rec #:  [id number]        Height:      69.0 in 175.3 cm  YOB: 1965        Weight:      220.5 lb 100.00 kg  Patient Age:    54 years         BSA:         2.15 m²  Patient Gender: M                BP:          82/63 mmHg       Date of Exam:        2/18/2020 5:26:01 PM  Referring Physician: [id number] CHRISTY SARMIENTO  Sonographer:         Radha Sutton  Fellow:              Linus Duran     Reading Physician:   Manuel Batres M.D.    Procedure:   2D Echo/Doppler/Color Doppler Complete.  Indications: R07.9 - Chest Pain, unspecified  Diagnosis:   Chest pain, unspecified - R07.9     Summary:   1. Left ventricular ejection fraction, by visual estimation, is 20 to 25%.   2. Severely decreased global left ventricular systolic function.   3. Mildly increased LV wall thickness.   4. Normal left ventricular internal cavity size.   5. Severe global hypokinesia noted.   6. No evidence of mitral valve regurgitation.   7. Structurally normal mitral valve, with normal leaflet excursion.   8. Sclerotic aortic valve with decreased opening.   9. Mildly reduced AV opening. No AS on limited AV doppler.    PHYSICIAN INTERPRETATION:  Left Ventricle: The left ventricular internal cavity size is normal. Left ventricular wall thickness is mildly increased. Global LV systolic function was severely decreased. Left ventricular ejection fraction, by visual estimation, is 20 to 25%. Severe global hypokinesia noted.  Right Ventricle: Normal right ventricular size and function.  Left Atrium: Normal left atrial size.  Right Atrium: Normal right atrial size.  Pericardium: There is no evidence of pericardial effusion.  Mitral Valve: Structurally normal mitral valve, with normal leaflet excursion. The mitral valve is normal in structure. No evidence of mitral valve regurgitation is seen.  Tricuspid Valve: Structurally normal tricuspid valve, with normal leaflet excursion. The tricuspid valve is normal in structure. Mild tricuspid regurgitation is visualized.  Aortic Valve: Sclerotic aortic valve with decreased opening. No evidence of aortic valve regurgitation is seen. Mildly reduced AV opening. No AS on limited AV doppler.  Pulmonic Valve: Structurally normal pulmonic valve, with normal leaflet excursion. The pulmonic valve is normal. Mild pulmonic valve regurgitation.  Aorta: Theaortic root and ascending aorta are structurally normal, with no evidence of dilitation.  Pulmonary Artery: The main pulmonary artery is normal in size.  Venous: The inferior vena cava was normal sized, with respiratory size variation less than 50%.       2D AND M-MODE MEASUREMENTS (normal ranges within parentheses):  Left                  Normal   Aorta/Left             Normal  Ventricle:                     Atrium:  IVSd (2D):  1.32 cm  (0.7-1.1) AoV Cusp       2.11  (1.5-2.6)  LVPWd       1.27cm  (0.7-1.1) Separation:     cm  (2D):                          Left Atrium    2.82  (1.9-4.0)  LVIDd       4.66 cm  (3.4-5.7) (Mmode):        cm  (2D):                          Right  LVIDs       3.79 cm            Ventricle:  (2D):         RVd (Mmode):   1.99 cm  LV FS       18.7 %    (>25%)   RVd (2D):      2.68 cm  (2D):  IVSd        0.81 cm  (0.7-1.1)  (Mmode):  LVPWd       0.97 cm  (0.7-1.1)  (Mmode):  LVIDd       4.74 cm  (3.4-5.7)  (Mmode):  LVIDs       3.92 cm  (Mmode):  LV FS       17.2 %    (>25%)  (Mmode):  Relative     0.55     (<0.42)  Wall  Thickness  Rel. Wall    0.41     (<0.42)  Thickness  Mm  LV Mass    66.2 g/m²  Index:  Mmode    SPECTRAL DOPPLER ANALYSIS:  LV DIASTOLIC FUNCTION:  MV Peak E: 0.31 m/s Decel Time: 90 msec  MV Peak A: 0.48 m/s  E/A Ratio: 0.65    Aortic Valve:  AoV VMax:    1.04 m/s  AoV Pk Grad: 4.4 mmHg    LVOT Diam: 2.10 cm    Mitral Valve:  MV P1/2 Time: 26.01 msec  MV Area, PHT: 8.46 cm²    Tricuspid Valve and PA/RV Systolic Pressure: TR Max Velocity: 1.77 m/s RA Pressure: 10 mmHg RVSP/PASP: 22.5 mmHg     LABS:                        14.0   13.73 )-----------( 155      ( 20 Feb 2020 04:13 )             41.5     02-20    141  |  101  |  43<H>  ----------------------------<  139<H>  4.6   |  22  |  1.4    Ca    9.2      20 Feb 2020 04:13  Phos  4.4     02-20  Mg     2.3     02-20    TPro  7.0  /  Alb  4.2  /  TBili  0.7  /  DBili  0.2  /  AST  106<H>  /  ALT  190<H>  /  AlkPhos  63  02-20    CARDIAC MARKERS ( 19 Feb 2020 05:58 )  x     / 0.42 ng/mL / x     / x     / x      CARDIAC MARKERS ( 19 Feb 2020 00:20 )  x     / 0.60 ng/mL / 1019 U/L / x     / 76.0 ng/mL  CARDIAC MARKERS ( 18 Feb 2020 22:15 )  x     / 0.75 ng/mL / x     / x     / x      CARDIAC MARKERS ( 18 Feb 2020 13:20 )  x     / 0.01 ng/mL / x     / x     / x          Magnesium, Serum: 2.3 mg/dL [1.8 - 2.4] (02-20-20 @ 04:13)  Magnesium, Serum: 2.3 mg/dL [1.8 - 2.4] (02-19-20 @ 21:05)  LIVER FUNCTIONS - ( 20 Feb 2020 04:13 )  Alb: 4.2 g/dL / Pro: 7.0 g/dL / ALK PHOS: 63 U/L / ALT: 190 U/L / AST: 106 U/L / GGT: x             A/P:  I discussed the case with Cardiologist Dr. Sandoval and recommend the following:    S/P PCI: BALJINDER x 2 pLAD, Anoxic Encephalopathy, Code ICE                     Daily ekg                   May increase metropolol dosing as tolerated by B/P and HR                   Continue with IVF                   DVT / GI prophylaxis                    HOB > 45 degrees                    Monitor Cr, LFT's                   Strict I / O                   Keep K = 4, Mg = 2                   Daily sedation vacation                    Keep MAP > 65                   monitor Cardiac Cath access site                   Follow neuro recommendations                    Continue DAPT ( Aspirin 81 mg daily and Plavix 75 mg daily ), B-Blocker, Statin Therapy                   Continue CCU monitoring          agree with above

## 2024-08-21 NOTE — PROGRESS NOTES
OCCUPATIONAL THERAPY TREATMENT NOTE  [facility]  [address] [place]        Date:2024                                                  Patient Name: Charity Simpson    MRN: [id number]    : 1940    Room: [address] [place] [address]      Evaluating OT: Henrique Villatoro OTR/L; #[id number]     Referring Provider and Specific Provider Orders/Date:      24  OT eval and treat  Start:  24,   End:  24,   ONE TIME,   Standing Count:  1 Occurrences,   R         George Escamilla MD      Placement Recommendation: Subacute Rehab       Diagnosis:   1. Altered mental status, unspecified altered mental status type    2. Pneumonia due to infectious organism, unspecified laterality, unspecified part of lung         Surgery: None      Pertinent Medical History:       Past Medical History:   Diagnosis Date    Cardiac arrhythmia     Closed C4 fracture (HCC) 2021    Dementia (HCC)     Depression     Esophageal dysphagia     Giant cell arteritis (HCC)     Hyperlipidemia     Hypertension     Idiopathic progressive polyneuropathy     Migraine     Migraine without aura and without status migrainosus, not intractable     Nausea     Rheumatoid arthritis (HCC)     Supraspinatus tendinitis     Urinary incontinence          Past Surgical History:   Procedure Laterality Date    ABDOMEN SURGERY          Precautions:  Fall Risk, Whole body tremors (on evaluation), Up as Tolerated,      Assessment of current deficits:     [x] Functional mobility  [x]ADLs  [x] Strength               []Cognition    [x] Functional transfers   [x] IADLs         [] Safety Awareness   [x]Endurance    [] Fine Coordination              [x] Balance      [] Vision/perception   []Sensation     []Gross Motor Coordination  [] ROM  [] Delirium                   [] Motor Control     OT PLAN OF CARE   OT POC based on physician orders, patient diagnosis and results of clinical  assessment    Frequency/Duration 1-3 days/wk for 2 weeks PRN     Specific OT Treatment Interventions to include:   * Instruction/training on adapted ADL techniques and AE recommendations to increase functional independence within precautions       * Training on energy conservation strategies, correct breathing pattern and techniques to improve independence/tolerance for self-care routine  * Functional transfer/mobility training/DME recommendations for increased independence, safety, and fall prevention  * Therapeutic exercise to improve motor endurance, ROM, and functional strength for ADLs/functional transfers  * Therapeutic activities to facilitate/challenge dynamic balance, stand tolerance for increased safety and independence with ADLs  * Positioning to improve skin integrity, interaction with environment and functional independence    Recommended Adaptive Equipment: TBD at Rehab    Home Living: Patient from [facility]. Walk in Shower      Equipment owned: Wheelchair, Wheeled Walker, and Shower chair,      Prior Level of Function: Assistance from staff with ADLs , Dependent with IADLs; w/c mobility      Pain Level: 8/10 pain in back; Nursing notified.      Cognition: A&O: 1/4; Follows 1 step directions   Memory: fair -   Sequencing: fair -   Problem solving: fair -   Judgement/safety: fair -    [facility] Daily Activity - Inpatient   How much help is needed for putting on and taking off regular lower body clothing?: A Lot  How much help is needed for bathing (which includes washing, rinsing, drying)?: A Lot  How much help is needed for toileting (which includes using toilet, bedpan, or urinal)?: A Lot  How much help is needed for putting on and taking off regular upper body clothing?: A Little  How much help is needed for taking care of personal grooming?: A Little  How much help for eating meals?: A Little  [facility] Inpatient Daily Activity Raw Score: 15  AM-PAC Inpatient ADL T-Scale  Score : 34.69  ADL Inpatient CMS 0-100% Score: 56.46  ADL Inpatient CMS G-Code Modifier : CK         Functional Assessment:    Initial Eval Status  Date: 8/19/24 Treatment Status  Date:8/21/24 STGs = LTGs  Time frame: 10-14 days   Feeding Moderate Assist     Increase assistance at times due to increase tremors. Supervision    No observed tremors this date.  Supervision    Grooming Moderate Assist  Minimal Assistance    Seated in bedside chair; Setup assistance with oral care.  Supervision    UB Dressing Moderate Assist  Minimal Assistance    Gown management.  Supervision    LB Dressing Dependent  Maximal Assistance    Donning socks.  Maximal Assist    Bathing Maximal Assist N/T Minimal Assist    Toileting Dependent  N/T Maximal Assist    Bed Mobility  Supine to sit: Maximal Assist   Sit to supine: Maximal Assist   Rolling:Maximal Assist   Supine to sit: Minimal Assistance  Sit to supine: N/T  Rolling:Maximal N/T Supine to sit: Minimal Assist   Sit to supine: Minimal Assist   Rolling:Minimal Assist     Functional Transfers Not assessed  d/t pt overall debility, decreased activity tolerance, balance deficits, safety and fall risk.   Moderate Assistance from EOB<Bedside chair; Balance with boost and control descent.  Moderate Assist    Functional Mobility Not assessed  d/t pt overall debility, decreased activity tolerance, balance deficits, safety and fall risk.   N/T W/c mobility    Balance Sitting:     Static: fair -    Dynamic: poor+  Standing: Not assessed  d/t pt overall debility, decreased activity tolerance, balance deficits, safety and fall risk.   Sitting:     Static: fair     Dynamic: Fair-  Standing:Fair- Sitting:     Static: good     Dynamic: fair   Standing: fair  with least restrictive device.    Activity Tolerance Poor+  fair    Visual/  Perceptual Glasses: Yes                Hand Dominance: Right     AROM (PROM) Strength Additional Info:  Goal:   RUE  WFL 2-/5  Proximal   3+/5   Distal Fair-  and  Fair- FMC/dexterity noted during ADL tasks   Improve overall RUE strength  for participation in functional tasks   LUE WFL 2-/5  Proximal  3+/5  Distal Fair-  and Fair- FMC/dexterity noted during ADL tasks   Improve overall LUE strength  for participation in functional tasks         Hearing: WFL   Sensation:  No c/o numbness or tingling  Tone: WFL   Edema: None    Comments: Patient experience no whole body tremors this date with therapy. Nursing approved therapy session. Upon arrival the patient was supine with HOB elevated.  At end of session, patient was seated in bedside chair with call light and phone within reach, all lines and tubes intact.  Overall patient demonstrated decreased independence and safety during completion of ADL/functional transfer/mobility tasks.  Pt would benefit from continued skilled OT to increase safety and independence with completion of ADL/IADL tasks for functional independence and quality of life.    Treatment: OT treatment provided this date includes:   Instruction/training on safety and adapted techniques for completion of ADLs   Instruction/training on safe functional mobility/transfer techniques   Instruction/training on energy conservation/work simplification for completion of ADLs   Instruction/training on proper positioning/alignment to prevent contractures    Neuromuscular Reeducation to facilitate balance/righting reactions for increased function with ADLs tasks    Treatment:      Functional transfers: Facilitated transfers to/from EOB and chair with cues for body alignment, safety and hand placement.  ADL completion: Self-care retraining for the above-mentioned ADLs; training on proper hand placement, safety technique, sequencing, and energy conservation techniques.  Postural Balance: Sitting/standing balance retraining to improve righting reactions with postural changes during ADLs.        Rehab Potential: Good for established goals.      Patient / Family Goal: Return

## 2024-08-21 NOTE — PROGRESS NOTES
Primary Care Physician: George Escamilla MD   Admitting Physician:  George Escamilla MD  Admission date and time: 8/18/2024  2:05 PM    Room:  [address] [place] [address]  Admitting diagnosis: Altered mental status, unspecified altered mental status type [R41.82]  Pneumonia due to infectious organism, unspecified laterality, unspecified part of lung [J18.9]  AMS (altered mental status) [R41.82]  Altered mental status [R41.82]    Patient Name: Charity Simpson  MRN: [id number]    Date of Service: 8/21/2024     Subjective:  Charity is a 84 y.o. female who was seen and examined today,8/21/2024, at the bedside.  Patient continues to complain of severe low back pain radiating to both lower extremity right more than left    NO family present during my examination.      ROS:  General:   Denies chills, fatigue, fever, malaise, night sweats or weight loss     Psychological:   Denies anxiety, disorientation or hallucinations     ENT:    Denies epistaxis, headaches, vertigo or visual changes     Cardiovascular:   Denies any chest pain, irregular heartbeats, or palpitations. No paroxysmal nocturnal dyspnea.     Respiratory:   Denies shortness of breath, coughing, sputum production, hemoptysis, or wheezing.  No orthopnea.     Gastrointestinal:   Denies nausea, vomiting, diarrhea, or constipation.  Denies any abdominal pain.  Denies change in bowel habits or stools.       Genito-Urinary:    Denies any urgency, frequency, hematuria.  Voiding without difficulty.     Musculoskeletal:   Denies joint pain, joint stiffness, joint swelling or muscle pain; ++ low back pain     Neurology:    Denies any headache. No weakness or paresthesia; ++ confusion     Derm:    Denies any rashes, ulcers, or excoriations.  Denies bruising.      Extremities:   Denies any lower extremity swelling or edema.    Physical Exam:  I/O this shift:  In: 160 [P.O.:160]  Out: 325 [Urine:325]    Intake/Output Summary (Last 24 hours) at 8/21/2024 1426  Last data filed at 8/21/2024  1239  Gross per 24 hour   Intake 160 ml   Output 1275 ml   Net -1115 ml   I/O last 3 completed shifts:  In: 180 [P.O.:180]  Out: 2150 [Urine:2150]  Patient Vitals for the past 96 hrs (Last 3 readings):   Weight   08/19/24 0600 61.7 kg (136 lb)   08/18/24 2045 61.7 kg (136 lb)     Vital Signs:   Blood pressure 136/71, pulse 88, temperature 97.8 °F (36.6 °C), temperature source Oral, resp. rate 16, height 1.702 m (5' 7.01\"), weight 61.7 kg (136 lb), SpO2 94%.      CONSTITUTIONAL:    Awake, alert, cooperative, no apparent distress, and appears stated age     EYES:    PERRL, EOMI, sclera clear, conjunctiva normal     ENT:    Normocephalic, atraumatic, sinuses nontender on palpation. External ears without lesions. Oral pharynx with moist mucus membranes.  Tonsils without erythema or exudates.     NECK:    Supple, symmetrical, trachea midline, no adenopathy, thyroid symmetric, not enlarged and no tenderness, skin normal, no bruits, no JVD     HEMATOLOGIC/LYMPHATICS:    No cervical lymphadenopathy and no supraclavicular lymphadenopathy     LUNGS:    Symmetric. No increased work of breathing, good air exchange, clear to auscultation bilaterally, no wheezes, rhonchi, or rales,      CARDIOVASCULAR:    Normal apical impulse, regular rate and rhythm, normal S1 and S2, no S3 or S4, and no murmur noted     ABDOMEN:    No scars, normal bowel sounds, soft, non-distended, non-tender, no masses palpated, no hepatosplenomegaly, no rebound or guarding elicited on palpation      MUSCULOSKELETAL:    There is no redness, warmth, or swelling of the joints.  Full range of motion noted.  Motor strength is 5 out of 5 all extremities bilaterally.  Tone is normal.     NEUROLOGIC:    Awake, alert, oriented to name, place and time.  Cranial nerves II-XII are grossly intact.  Motor is 5 out of 5 bilaterally.       SKIN:    No bruising or bleeding.  No redness, warmth, or swelling     EXTREMITIES:    Peripheral pulses present.  No edema, cyanosis,  08/21/2024 07:18 AM    ALT 6 08/21/2024 07:18 AM     Hepatic Function Panel:    Lab Results   Component Value Date/Time    ALKPHOS 101 08/21/2024 07:18 AM    ALT 6 08/21/2024 07:18 AM    AST 20 08/21/2024 07:18 AM    BILITOT 0.5 08/21/2024 07:18 AM     Calcium:    Lab Results   Component Value Date/Time    CALCIUM 9.3 08/21/2024 07:18 AM       MRI BRAIN WO CONTRAST   Final Result   1. No acute intracranial abnormality.  No acute infarct.   2. Mild global parenchymal volume loss with chronic microvascular ischemic   changes.         CT CHEST WO CONTRAST   Final Result   1. No acute intrathoracic abnormality.  No acute airspace disease with   chronic changes as discussed above   2. Cardiomegaly.   3. Large hiatal hernia and patulous esophagus..   4. Biapical pleuroparenchymal scarring with components of calcified nodular   scarring at the apices.         CT HEAD WO CONTRAST   Final Result      1.  No evidence of acute intracranial process.      2.  Findings of presumed mild small vessel ischemic deep white matter disease.         CT LUMBAR SPINE WO CONTRAST   Final Result   1. No acute fracture or malalignment of the lumbar spine.   2. Moderate left convexity upper lumbar scoliosis measuring at least 41   degrees.   3. Moderate degenerative disc disease at all lumbar levels. Mild-to-moderate   facet arthropathy throughout the lower lumbar spine.   4. Mild chronic circumferential canal stenosis at L4-5 with the thecal sac   measuring 7 mm in AP diameter.         CT ABDOMEN PELVIS WO CONTRAST Additional Contrast? None   Final Result      1.  Large hiatal hernia.      2.  Distended but otherwise unremarkable appearing urinary bladder.      3.  Otherwise no acute pathology noted.         XR CHEST PORTABLE   Final Result   Increased density in the left lower lung base relate with at least in mild   degree of left-sided pleural effusion and areas of subsegmental atelectasis   in the left lower lobe.  Cannot exclude

## 2024-08-21 NOTE — PROGRESS NOTES
Primary Care Physician: George Escamilla MD   Admitting Physician:  George Escamilla MD  Admission date and time: 8/18/2024  2:05 PM    Room:  [address] [place] [address]  Admitting diagnosis: Altered mental status, unspecified altered mental status type [R41.82]  Pneumonia due to infectious organism, unspecified laterality, unspecified part of lung [J18.9]  AMS (altered mental status) [R41.82]  Altered mental status [R41.82]    Patient Name: Charity Simpson  MRN: [id number]    Date of Service: 8/20/2024     Subjective:  Charity is a 84 y.o. female who was seen and examined today,8/20/2024, at the bedside.  Patient continues to complain of severe low back pain radiating to both lower extremity right more than left    NO family present during my examination.      ROS:  General:   Denies chills, fatigue, fever, malaise, night sweats or weight loss     Psychological:   Denies anxiety, disorientation or hallucinations     ENT:    Denies epistaxis, headaches, vertigo or visual changes     Cardiovascular:   Denies any chest pain, irregular heartbeats, or palpitations. No paroxysmal nocturnal dyspnea.     Respiratory:   Denies shortness of breath, coughing, sputum production, hemoptysis, or wheezing.  No orthopnea.     Gastrointestinal:   Denies nausea, vomiting, diarrhea, or constipation.  Denies any abdominal pain.  Denies change in bowel habits or stools.       Genito-Urinary:    Denies any urgency, frequency, hematuria.  Voiding without difficulty.     Musculoskeletal:   Denies joint pain, joint stiffness, joint swelling or muscle pain; ++ low back pain     Neurology:    Denies any headache. No weakness or paresthesia; ++ confusion     Derm:    Denies any rashes, ulcers, or excoriations.  Denies bruising.      Extremities:   Denies any lower extremity swelling or edema.    Physical Exam:  No intake/output data recorded.    Intake/Output Summary (Last 24 hours) at 8/20/2024 2113  Last data filed at 8/20/2024 1306  Gross per 24 hour  associated superimposed   pneumonic process in the left base.                  Assessment:    Pneumonia  Altered mental statis  Lactic acidosis  Hypercalcemia  Elevated troponin  Anemia  Chronic low back pain  Rheumatoid arthritis       Plan:     Admit to IMCU  Continue home medications  Start IV Rocephin  Start IV Vibramycin  Start IV fluids  DVT prophylaxis  Regular diet  Full code            More than 50% of my time was spent at the bedside counseling/coordinating care with the patient and/or family with face to face contact.  This time was spent reviewing notes and laboratory data as well as instructing and counseling the patient. Time I spent with the family or surrogate(s) is included only if the patient was incapable of providing the necessary information or participating in medical decisions. I also discussed the differential diagnosis and all of the proposed management plans with the patient and individuals accompanying the patient. I am readily available for any further decision-making and intervention.       Electronically signed by George Escamilla MD on 8/20/2024 at 9:13 PM

## 2024-08-22 LAB
ALBUMIN SERPL-MCNC: 3.2 G/DL (ref 3.5–5.2)
ALP SERPL-CCNC: 98 U/L (ref 35–104)
ALT SERPL-CCNC: 7 U/L (ref 0–32)
ANION GAP SERPL CALCULATED.3IONS-SCNC: 9 MMOL/L (ref 7–16)
AST SERPL-CCNC: 17 U/L (ref 0–31)
BILIRUB SERPL-MCNC: 0.3 MG/DL (ref 0–1.2)
BUN SERPL-MCNC: 13 MG/DL (ref 6–23)
CALCIUM SERPL-MCNC: 9 MG/DL (ref 8.6–10.2)
CHLORIDE SERPL-SCNC: 102 MMOL/L (ref 98–107)
CO2 SERPL-SCNC: 28 MMOL/L (ref 22–29)
CREAT SERPL-MCNC: 0.9 MG/DL (ref 0.5–1)
CRP SERPL HS-MCNC: 12 MG/L (ref 0–5)
ERYTHROCYTE [DISTWIDTH] IN BLOOD BY AUTOMATED COUNT: 17.1 % (ref 11.5–15)
ERYTHROCYTE [SEDIMENTATION RATE] IN BLOOD BY WESTERGREN METHOD: 51 MM/HR (ref 0–20)
GFR, ESTIMATED: 66 ML/MIN/1.73M2
GLUCOSE SERPL-MCNC: 105 MG/DL (ref 74–99)
HCT VFR BLD AUTO: 37.2 % (ref 34–48)
HGB BLD-MCNC: 11 G/DL (ref 11.5–15.5)
MCH RBC QN AUTO: 27.2 PG (ref 26–35)
MCHC RBC AUTO-ENTMCNC: 29.6 G/DL (ref 32–34.5)
MCV RBC AUTO: 92.1 FL (ref 80–99.9)
PLATELET # BLD AUTO: 294 K/UL (ref 130–450)
PMV BLD AUTO: 9.5 FL (ref 7–12)
POTASSIUM SERPL-SCNC: 4.1 MMOL/L (ref 3.5–5)
PROT SERPL-MCNC: 6.5 G/DL (ref 6.4–8.3)
RBC # BLD AUTO: 4.04 M/UL (ref 3.5–5.5)
SODIUM SERPL-SCNC: 139 MMOL/L (ref 132–146)
WBC OTHER # BLD: 7 K/UL (ref 4.5–11.5)

## 2024-08-22 PROCEDURE — 80053 COMPREHEN METABOLIC PANEL: CPT

## 2024-08-22 PROCEDURE — 86140 C-REACTIVE PROTEIN: CPT

## 2024-08-22 PROCEDURE — 2580000003 HC RX 258: Performed by: INTERNAL MEDICINE

## 2024-08-22 PROCEDURE — 6370000000 HC RX 637 (ALT 250 FOR IP): Performed by: INTERNAL MEDICINE

## 2024-08-22 PROCEDURE — 85027 COMPLETE CBC AUTOMATED: CPT

## 2024-08-22 PROCEDURE — 2500000003 HC RX 250 WO HCPCS: Performed by: INTERNAL MEDICINE

## 2024-08-22 PROCEDURE — 6360000002 HC RX W HCPCS: Performed by: INTERNAL MEDICINE

## 2024-08-22 PROCEDURE — 2060000000 HC ICU INTERMEDIATE R&B

## 2024-08-22 PROCEDURE — 85652 RBC SED RATE AUTOMATED: CPT

## 2024-08-22 PROCEDURE — 36415 COLL VENOUS BLD VENIPUNCTURE: CPT

## 2024-08-22 RX ADMIN — POLYVINYL ALCOHOL, POVIDONE 1 DROP: 14; 6 SOLUTION/ DROPS OPHTHALMIC at 10:45

## 2024-08-22 RX ADMIN — TRAMADOL HYDROCHLORIDE 50 MG: 50 TABLET ORAL at 08:16

## 2024-08-22 RX ADMIN — MEMANTINE 10 MG: 10 TABLET ORAL at 21:31

## 2024-08-22 RX ADMIN — MEMANTINE 10 MG: 10 TABLET ORAL at 10:46

## 2024-08-22 RX ADMIN — DOCUSATE SODIUM 100 MG: 100 CAPSULE, LIQUID FILLED ORAL at 10:45

## 2024-08-22 RX ADMIN — POLYETHYLENE GLYCOL 3350 17 G: 17 POWDER, FOR SOLUTION ORAL at 10:46

## 2024-08-22 RX ADMIN — DOXYCYCLINE 100 MG: 100 INJECTION, POWDER, LYOPHILIZED, FOR SOLUTION INTRAVENOUS at 21:41

## 2024-08-22 RX ADMIN — POLYVINYL ALCOHOL, POVIDONE 1 DROP: 14; 6 SOLUTION/ DROPS OPHTHALMIC at 14:03

## 2024-08-22 RX ADMIN — DOXYCYCLINE 100 MG: 100 INJECTION, POWDER, LYOPHILIZED, FOR SOLUTION INTRAVENOUS at 11:05

## 2024-08-22 RX ADMIN — OXYBUTYNIN CHLORIDE 5 MG: 5 TABLET ORAL at 10:45

## 2024-08-22 RX ADMIN — MECLIZINE HYDROCHLORIDE 12.5 MG: 12.5 TABLET ORAL at 21:31

## 2024-08-22 RX ADMIN — DULOXETINE 30 MG: 30 CAPSULE, DELAYED RELEASE ORAL at 12:36

## 2024-08-22 RX ADMIN — GABAPENTIN 300 MG: 300 CAPSULE ORAL at 09:04

## 2024-08-22 RX ADMIN — MECLIZINE HYDROCHLORIDE 12.5 MG: 12.5 TABLET ORAL at 10:45

## 2024-08-22 RX ADMIN — Medication 1000 UNITS: at 21:31

## 2024-08-22 RX ADMIN — WATER 2000 MG: 1 INJECTION INTRAMUSCULAR; INTRAVENOUS; SUBCUTANEOUS at 21:35

## 2024-08-22 RX ADMIN — MECLIZINE HYDROCHLORIDE 12.5 MG: 12.5 TABLET ORAL at 14:04

## 2024-08-22 RX ADMIN — Medication 1300 MG: at 21:31

## 2024-08-22 RX ADMIN — SODIUM CHLORIDE, PRESERVATIVE FREE 10 ML: 5 INJECTION INTRAVENOUS at 21:43

## 2024-08-22 RX ADMIN — OXYBUTYNIN CHLORIDE 5 MG: 5 TABLET ORAL at 14:03

## 2024-08-22 RX ADMIN — DOCUSATE SODIUM 100 MG: 100 CAPSULE, LIQUID FILLED ORAL at 21:31

## 2024-08-22 RX ADMIN — FOLIC ACID 1000 MCG: 1 TABLET ORAL at 10:45

## 2024-08-22 RX ADMIN — GABAPENTIN 300 MG: 300 CAPSULE ORAL at 14:04

## 2024-08-22 RX ADMIN — TEMAZEPAM 15 MG: 15 CAPSULE ORAL at 21:31

## 2024-08-22 RX ADMIN — SODIUM CHLORIDE, PRESERVATIVE FREE 10 ML: 5 INJECTION INTRAVENOUS at 10:43

## 2024-08-22 RX ADMIN — ENOXAPARIN SODIUM 40 MG: 100 INJECTION SUBCUTANEOUS at 09:00

## 2024-08-22 RX ADMIN — GABAPENTIN 300 MG: 300 CAPSULE ORAL at 21:31

## 2024-08-22 RX ADMIN — Medication 1000 UNITS: at 10:45

## 2024-08-22 RX ADMIN — Medication 1300 MG: at 12:36

## 2024-08-22 RX ADMIN — OXYBUTYNIN CHLORIDE 5 MG: 5 TABLET ORAL at 21:31

## 2024-08-22 RX ADMIN — POLYVINYL ALCOHOL, POVIDONE 1 DROP: 14; 6 SOLUTION/ DROPS OPHTHALMIC at 21:31

## 2024-08-22 RX ADMIN — TRAMADOL HYDROCHLORIDE 50 MG: 50 TABLET ORAL at 14:03

## 2024-08-22 RX ADMIN — FLUOXETINE HYDROCHLORIDE 40 MG: 20 CAPSULE ORAL at 10:46

## 2024-08-22 RX ADMIN — TRAMADOL HYDROCHLORIDE 50 MG: 50 TABLET ORAL at 18:11

## 2024-08-22 ASSESSMENT — PAIN SCALES - GENERAL
PAINLEVEL_OUTOF10: 0
PAINLEVEL_OUTOF10: 3
PAINLEVEL_OUTOF10: 1
PAINLEVEL_OUTOF10: 10
PAINLEVEL_OUTOF10: 0
PAINLEVEL_OUTOF10: 8
PAINLEVEL_OUTOF10: 0

## 2024-08-22 ASSESSMENT — PAIN DESCRIPTION - DESCRIPTORS
DESCRIPTORS: ACHING
DESCRIPTORS: ACHING
DESCRIPTORS: ACHING;PRESSURE;SORE

## 2024-08-22 ASSESSMENT — PAIN DESCRIPTION - LOCATION
LOCATION: BACK
LOCATION: BACK
LOCATION: GENERALIZED
LOCATION: BACK

## 2024-08-22 ASSESSMENT — PAIN - FUNCTIONAL ASSESSMENT
PAIN_FUNCTIONAL_ASSESSMENT: PREVENTS OR INTERFERES SOME ACTIVE ACTIVITIES AND ADLS
PAIN_FUNCTIONAL_ASSESSMENT: PREVENTS OR INTERFERES SOME ACTIVE ACTIVITIES AND ADLS

## 2024-08-22 ASSESSMENT — PAIN SCALES - WONG BAKER: WONGBAKER_NUMERICALRESPONSE: HURTS LITTLE MORE

## 2024-08-22 ASSESSMENT — PAIN DESCRIPTION - ORIENTATION: ORIENTATION: MID;ANTERIOR

## 2024-08-22 NOTE — PROGRESS NOTES
08/22/24 1259   Encounter Summary   Encounter Overview/Reason Spiritual/Emotional Needs   Service Provided For Patient   Referral/Consult From [facility]   Support System Children   Last Encounter  08/22/24  (L-CW)   Complexity of Encounter Moderate   Spiritual/Emotional needs   Type Spiritual Support   Assessment/Intervention/Outcome   Assessment Calm   Intervention Active listening;Discussed belief system/Religion practices/harriet;Discussed illness injury and it’s impact;Discussed relationship with God;Explored/Affirmed feelings, thoughts, concerns;Read/Provided Scripture;Prayer (assurance of)/[place]   Outcome Comfort;Engaged in conversation;Expressed Gratitude;Peace;Receptive      visited patient. Patient was in bed. Patient appeared to be calm.  provided a listening presence and offered prayer. Patient was grateful for visit. Spiritual care is ongoing as needed.      Ovidio Siddiqui [phone]

## 2024-08-22 NOTE — DISCHARGE INSTR - COC
Continuity of Care Form    Patient Name: Charity Simpson   :  1940  MRN:  [id number]    Admit date:  2024  Discharge date:      Code Status Order: Full Code   Advance Directives:   Advance Care Flowsheet Documentation             Admitting Physician:  George Escamilla MD  PCP: George Escamilla MD    Discharging Nurse: nereyda  Discharging Hospital Unit/Room#: 0614/0614-02  Discharging Unit Phone Number: [phone]    Emergency Contact:   Extended Emergency Contact Information  Primary Emergency Contact: Michelle Ramirez  Home Phone: [phone]  Work Phone: [phone]  Mobile Phone: [phone]  Relation: Child  Preferred language: English   needed? No    Past Surgical History:  Past Surgical History:   Procedure Laterality Date    ABDOMEN SURGERY         Immunization History:   Immunization History   Administered Date(s) Administered    COVID-19, PFIZER Bivalent, DO NOT Dilute, (age 12y+), IM, 30 mcg/0.3 mL 10/05/2022    COVID-19, PFIZER PURPLE top, DILUTE for use, (age 12 y+), 30mcg/0.3mL 2020, 2021, 10/20/2021    Influenza, FLUAD, (age 65 y+), IM, Quadv, 0.5mL 2023    Influenza, FLUZONE High Dose, (age 65 y+), IM, Trivalent PF, 0.5mL 10/16/2019    Pneumococcal, PPSV23, PNEUMOVAX 23, (age 2y+), SC/IM, 0.5mL 09/15/2011    Zoster Live (Zostavax) 2014       Active Problems:  Patient Active Problem List   Diagnosis Code    Trauma T14.90XA    AMS (altered mental status) R41.82    Altered mental status R41.82    Pneumonia J18.9    Lactic acidosis E87.20    Hypercalcemia E83.52    Dementia (HCC) F03.90       Isolation/Infection:   Isolation            No Isolation          Patient Infection Status       None to display                     Nurse Assessment:  Last Vital Signs: BP (!) 104/46   Pulse 87   Temp 97.8 °F (36.6 °C) (Infrared)   Resp 18   Ht 1.702 m (5' 7.01\")   Wt 61.7 kg (136 lb)   SpO2 94%   BMI 21.45 kg/m²     Last documented pain score (0-10 scale):

## 2024-08-22 NOTE — PROGRESS NOTES
Patient - Charity Simpson,  Age - 84 y.o.    - 1940      Room Number - 0614/0614-02   MRN -  [id number]   [facility] # - [id number]  Date of Admission -  2024  2:05 PM    Problem list of patient:     Hospital Problems             Last Modified POA    * (Principal) AMS (altered mental status) 2024 Yes    Altered mental status 2024 Yes    Pneumonia 2024 Yes    Lactic acidosis 2024 Yes    Hypercalcemia 2024 Yes    Dementia (HCC) 2024 Yes     ASSESSMENT/PLAN     Cons bacteremia prob contaminant  Pneumonia left lung    Infection Control Recommendations   [place] Precautions  Borges Catheter           Coordination of Outpatient Care:   Estimated Length of  antimicrobials: TBD  Patient will need Midline Catheter Insertion or  PICC line Insertion:TBD  Patient will need: Home IV , Infusion Center,  SNF,  LTAC: TBD       SUBJECTIVE:   DOS:  24 afebrile in chair eating has no c/o no f/c/n/v/d/ rash/itch     OBJECTIVE   VITALS    height is 1.702 m (5' 7.01\") and weight is 61.7 kg (136 lb). Her infrared temperature is 97.8 °F (36.6 °C). Her blood pressure is 104/46 (abnormal) and her pulse is 87. Her respiration is 18 and oxygen saturation is 94%.       General Appearance:   awake and alert   HEENT:    at/nc  Neck:   supple, no mass  Lungs:    Dec bs poat b kyphotic  Cardiovascular:  s1/s2  Abdomen:   soft nt /nd  :  Neurologic:   nad no focal deficits   Skin :    no rash   Extremities:   has rom         Peripheral Intravenous Line:  Peripheral IV 24 Right Antecubital (Active)   Site Assessment Clean, dry & intact 24   Line Status Flushed;Blood return noted 24   Line Care Connections checked and tightened 24   Phlebitis Assessment No symptoms 24   Infiltration Assessment 0 24   Dressing Status Clean;Dry;Intact;Clean, dry & intact 24  Detected     Klebsiella pneumoniae group Not Detected     Pseudomonas aeruginosa Not Detected     Proteus spp Not Detected     Salmonella species Not Detected     SERRATIA MARCESCENS Not Detected     Stenotrophomonas maltophilia Not Detected     Haemophilus influenzae Not Detected     Listeria monocytogenes Not Detected     Bacteroides fragilis Not Detected     Neisseria Meningitidis, NMNI Not Detected     Staphylococcus spp DETECTED     Staphylococcus Aureus Not Detected     Staphylococcus epidermidis DETECTED     Staphylococcus lugdunensis Not Detected     Streptococcus spp Not Detected     Enterococcus faecalis Not Detected     Enterococcus faecium Not Detected     Streptococcus agalactiae (Group B) Not Detected     Streptococcus pneumoniae Not Detected     Streptococcus pyogenes Not Detected     Methicillin Resistance mecA/C  by PCR Not Detected            IMAGING:     MRI BRAIN WO CONTRAST    Result Date: 8/19/2024  1. No acute intracranial abnormality.  No acute infarct. 2. Mild global parenchymal volume loss with chronic microvascular ischemic changes.     CT CHEST WO CONTRAST    Result Date: 8/19/2024  1. No acute intrathoracic abnormality.  No acute airspace disease with chronic changes as discussed above 2. Cardiomegaly. 3. Large hiatal hernia and patulous esophagus.. 4. Biapical pleuroparenchymal scarring with components of calcified nodular scarring at the apices.     CT LUMBAR SPINE WO CONTRAST    Result Date: 8/18/2024  1. No acute fracture or malalignment of the lumbar spine. 2. Moderate left convexity upper lumbar scoliosis measuring at least 41 degrees. 3. Moderate degenerative disc disease at all lumbar levels. Mild-to-moderate facet arthropathy throughout the lower lumbar spine. 4. Mild chronic circumferential canal stenosis at L4-5 with the thecal sac measuring 7 mm in AP diameter.     CT ABDOMEN PELVIS WO CONTRAST Additional Contrast? None    Result Date: 8/18/2024  1.  Large hiatal hernia. 2.   CHEST PORTABLE    Result Date: 8/18/2024  Increased density in the left lower lung base relate with at least in mild degree of left-sided pleural effusion and areas of subsegmental atelectasis in the left lower lobe.  Cannot exclude underline associated superimposed pneumonic process in the left base.         Please call [phone]  for any questions or concerns.    Electronically signed by Rosaura Covarrubias MD on 8/22/2024 at 11:39 AM

## 2024-08-22 NOTE — CARE COORDINATION
SOCIAL WORK / DISCHARGE PLANNING:  Dc plan to [facility], [facility] [phone], Fax [phone], skilled. NO PRECERT needed. Dtr agreeable with not returning  ID consulted, await plan, currently on IV Doxy/ IV Rocephin. If to need IV at SNF, will need IV access placed prior to dc. IRIS will need signed by physician. HENS form initiated, will need completed prior to dc.     Addendum: 1026am - Updated dtr Judy who will be traveling back from [place] tomorrow.       Electronically signed by MAURILIO Woods on 8/22/2024 at 10:21 AM

## 2024-08-22 NOTE — PLAN OF CARE
Problem: Discharge Planning  Goal: Discharge to home or other facility with appropriate resources  Outcome: Progressing  Flowsheets (Taken 8/22/2024 1741)  Discharge to home or other facility with appropriate resources:   Identify barriers to discharge with patient and caregiver   Arrange for needed discharge resources and transportation as appropriate   Identify discharge learning needs (meds, wound care, etc)     Problem: Pain  Goal: Verbalizes/displays adequate comfort level or baseline comfort level  Outcome: Progressing  Flowsheets  Taken 8/22/2024 1545  Verbalizes/displays adequate comfort level or baseline comfort level: Encourage patient to monitor pain and request assistance  Taken 8/22/2024 1230  Verbalizes/displays adequate comfort level or baseline comfort level:   Encourage patient to monitor pain and request assistance   Assess pain using appropriate pain scale   Administer analgesics based on type and severity of pain and evaluate response     Problem: ABCDS Injury Assessment  Goal: Absence of physical injury  Outcome: Progressing  Flowsheets (Taken 8/19/2024 1032)  Absence of Physical Injury: Implement safety measures based on patient assessment     Problem: Safety - Adult  Goal: Free from fall injury  Outcome: Progressing  Flowsheets (Taken 8/19/2024 1032)  Free From Fall Injury: Instruct family/caregiver on patient safety     Problem: Skin/Tissue Integrity  Goal: Absence of new skin breakdown  Description: 1.  Monitor for areas of redness and/or skin breakdown  2.  Assess vascular access sites hourly  3.  Every 4-6 hours minimum:  Change oxygen saturation probe site  4.  Every 4-6 hours:  If on nasal continuous positive airway pressure, respiratory therapy assess nares and determine need for appliance change or resting period.  Outcome: Progressing     Problem: Nutrition Deficit:  Goal: Optimize nutritional status  Outcome: Progressing  Flowsheets (Taken 8/22/2024 1741)  Nutrient intake

## 2024-08-23 VITALS
BODY MASS INDEX: 21.35 KG/M2 | DIASTOLIC BLOOD PRESSURE: 67 MMHG | OXYGEN SATURATION: 93 % | HEIGHT: 67 IN | RESPIRATION RATE: 15 BRPM | HEART RATE: 88 BPM | WEIGHT: 136 LBS | TEMPERATURE: 98 F | SYSTOLIC BLOOD PRESSURE: 122 MMHG

## 2024-08-23 LAB
ALBUMIN SERPL-MCNC: 3.2 G/DL (ref 3.5–5.2)
ALP SERPL-CCNC: 92 U/L (ref 35–104)
ALT SERPL-CCNC: <5 U/L (ref 0–32)
ANION GAP SERPL CALCULATED.3IONS-SCNC: 9 MMOL/L (ref 7–16)
AST SERPL-CCNC: 18 U/L (ref 0–31)
BILIRUB SERPL-MCNC: 0.2 MG/DL (ref 0–1.2)
BUN SERPL-MCNC: 18 MG/DL (ref 6–23)
CALCIUM SERPL-MCNC: 9.1 MG/DL (ref 8.6–10.2)
CHLORIDE SERPL-SCNC: 102 MMOL/L (ref 98–107)
CO2 SERPL-SCNC: 29 MMOL/L (ref 22–29)
CREAT SERPL-MCNC: 0.7 MG/DL (ref 0.5–1)
ERYTHROCYTE [DISTWIDTH] IN BLOOD BY AUTOMATED COUNT: 17.2 % (ref 11.5–15)
GFR, ESTIMATED: 85 ML/MIN/1.73M2
GLUCOSE SERPL-MCNC: 106 MG/DL (ref 74–99)
HCT VFR BLD AUTO: 35.1 % (ref 34–48)
HGB BLD-MCNC: 10.4 G/DL (ref 11.5–15.5)
MCH RBC QN AUTO: 27.2 PG (ref 26–35)
MCHC RBC AUTO-ENTMCNC: 29.6 G/DL (ref 32–34.5)
MCV RBC AUTO: 91.6 FL (ref 80–99.9)
MICROORGANISM SPEC CULT: NORMAL
PLATELET # BLD AUTO: 267 K/UL (ref 130–450)
PMV BLD AUTO: 9.3 FL (ref 7–12)
POTASSIUM SERPL-SCNC: 4.1 MMOL/L (ref 3.5–5)
PROT SERPL-MCNC: 6.4 G/DL (ref 6.4–8.3)
RBC # BLD AUTO: 3.83 M/UL (ref 3.5–5.5)
SERVICE CMNT-IMP: NORMAL
SODIUM SERPL-SCNC: 140 MMOL/L (ref 132–146)
SPECIMEN DESCRIPTION: NORMAL
WBC OTHER # BLD: 6.4 K/UL (ref 4.5–11.5)

## 2024-08-23 PROCEDURE — 80053 COMPREHEN METABOLIC PANEL: CPT

## 2024-08-23 PROCEDURE — 97110 THERAPEUTIC EXERCISES: CPT | Performed by: PHYSICAL THERAPIST

## 2024-08-23 PROCEDURE — 85027 COMPLETE CBC AUTOMATED: CPT

## 2024-08-23 PROCEDURE — 2500000003 HC RX 250 WO HCPCS: Performed by: INTERNAL MEDICINE

## 2024-08-23 PROCEDURE — 2580000003 HC RX 258: Performed by: INTERNAL MEDICINE

## 2024-08-23 PROCEDURE — 6370000000 HC RX 637 (ALT 250 FOR IP): Performed by: INTERNAL MEDICINE

## 2024-08-23 PROCEDURE — 97530 THERAPEUTIC ACTIVITIES: CPT | Performed by: PHYSICAL THERAPIST

## 2024-08-23 PROCEDURE — 36415 COLL VENOUS BLD VENIPUNCTURE: CPT

## 2024-08-23 PROCEDURE — 6360000002 HC RX W HCPCS: Performed by: INTERNAL MEDICINE

## 2024-08-23 RX ADMIN — FLUOXETINE HYDROCHLORIDE 40 MG: 20 CAPSULE ORAL at 08:48

## 2024-08-23 RX ADMIN — POLYVINYL ALCOHOL, POVIDONE 1 DROP: 14; 6 SOLUTION/ DROPS OPHTHALMIC at 13:59

## 2024-08-23 RX ADMIN — GABAPENTIN 300 MG: 300 CAPSULE ORAL at 13:59

## 2024-08-23 RX ADMIN — Medication 1300 MG: at 13:59

## 2024-08-23 RX ADMIN — DOXYCYCLINE 100 MG: 100 INJECTION, POWDER, LYOPHILIZED, FOR SOLUTION INTRAVENOUS at 09:01

## 2024-08-23 RX ADMIN — DULOXETINE 30 MG: 30 CAPSULE, DELAYED RELEASE ORAL at 11:44

## 2024-08-23 RX ADMIN — SODIUM CHLORIDE, PRESERVATIVE FREE 10 ML: 5 INJECTION INTRAVENOUS at 08:58

## 2024-08-23 RX ADMIN — POLYVINYL ALCOHOL, POVIDONE 1 DROP: 14; 6 SOLUTION/ DROPS OPHTHALMIC at 08:57

## 2024-08-23 RX ADMIN — MECLIZINE HYDROCHLORIDE 12.5 MG: 12.5 TABLET ORAL at 13:59

## 2024-08-23 RX ADMIN — OXYBUTYNIN CHLORIDE 5 MG: 5 TABLET ORAL at 08:48

## 2024-08-23 RX ADMIN — POLYETHYLENE GLYCOL 3350 17 G: 17 POWDER, FOR SOLUTION ORAL at 08:56

## 2024-08-23 RX ADMIN — OXYBUTYNIN CHLORIDE 5 MG: 5 TABLET ORAL at 13:59

## 2024-08-23 RX ADMIN — Medication 1300 MG: at 08:46

## 2024-08-23 RX ADMIN — Medication 1000 UNITS: at 08:48

## 2024-08-23 RX ADMIN — TRAMADOL HYDROCHLORIDE 50 MG: 50 TABLET ORAL at 05:00

## 2024-08-23 RX ADMIN — FOLIC ACID 1000 MCG: 1 TABLET ORAL at 08:48

## 2024-08-23 RX ADMIN — GABAPENTIN 300 MG: 300 CAPSULE ORAL at 08:47

## 2024-08-23 RX ADMIN — MEMANTINE 10 MG: 10 TABLET ORAL at 08:47

## 2024-08-23 RX ADMIN — DOCUSATE SODIUM 100 MG: 100 CAPSULE, LIQUID FILLED ORAL at 08:48

## 2024-08-23 RX ADMIN — ENOXAPARIN SODIUM 40 MG: 100 INJECTION SUBCUTANEOUS at 08:48

## 2024-08-23 ASSESSMENT — PAIN SCALES - GENERAL
PAINLEVEL_OUTOF10: 10
PAINLEVEL_OUTOF10: 10
PAINLEVEL_OUTOF10: 6
PAINLEVEL_OUTOF10: 10

## 2024-08-23 ASSESSMENT — PAIN DESCRIPTION - LOCATION: LOCATION: BACK;LEG

## 2024-08-23 ASSESSMENT — PAIN DESCRIPTION - DESCRIPTORS: DESCRIPTORS: ACHING;DISCOMFORT;THROBBING

## 2024-08-23 ASSESSMENT — PAIN SCALES - WONG BAKER: WONGBAKER_NUMERICALRESPONSE: HURTS LITTLE MORE

## 2024-08-23 ASSESSMENT — PAIN DESCRIPTION - ORIENTATION: ORIENTATION: RIGHT;LEFT

## 2024-08-23 NOTE — PROGRESS NOTES
Patient - Charity Simpson,  Age - 84 y.o.    - 1940      Room Number - 0614/0614-02   MRN -  [id number]   [facility] # - [id number]  Date of Admission -  2024  2:05 PM    Problem list of patient:     Hospital Problems             Last Modified POA    * (Principal) AMS (altered mental status) 2024 Yes    Altered mental status 2024 Yes    Pneumonia 2024 Yes    Lactic acidosis 2024 Yes    Hypercalcemia 2024 Yes    Dementia (HCC) 2024 Yes     ASSESSMENT/PLAN     Cons bacteremia prob contaminant  Pneumonia left lung pt will complete 5 days today will suggest d/c with no atbx     Infection Control Recommendations   [place] Precautions  Luther Catheter           Coordination of Outpatient Care:   Estimated Length of  antimicrobials:  compleetd   Patient will not  need Midline Catheter Insertion or  PICC line Insertion:   Patient will not need: Home IV         SUBJECTIVE:   DOS:  24 in bed has luther yellow clear no c/o telesitter present    afebrile in chair eating has no c/o no f/c/n/v/d/ rash/itch     OBJECTIVE   VITALS    height is 1.702 m (5' 7.01\") and weight is 61.7 kg (136 lb). Her temperature is 98 °F (36.7 °C). Her blood pressure is 122/67 and her pulse is 88. Her respiration is 15 and oxygen saturation is 93%.       General Appearance:   awake and alert   HEENT:   Anicteric at/nc     Lungs:    Dec bs post b   Cardiovascular:  s1/s2  Abdomen:   soft nt /nd  :   Luther   Neurologic:     no focal deficits   Skin :    no rash   Extremities:   has rom   Psych pleasant         Peripheral Intravenous Line:  Peripheral IV 24 Right Antecubital (Active)   Site Assessment Clean, dry & intact 24   Line Status Flushed;Blood return noted 24   Line Care Connections checked and tightened 24   Phlebitis Assessment No symptoms 24   Infiltration Assessment 0  **OR** ondansetron, polyethylene glycol    LABS:     Recent Labs     08/21/24  0718 08/22/24  0451 08/23/24  0609   WBC 5.7 7.0 6.4   HGB 10.6* 11.0* 10.4*    294 267     Recent Labs     08/21/24  0718 08/22/24  0451 08/23/24  0609    139 140   K 3.7 4.1 4.1   CL 99 102 102   CO2 28 28 29   BUN 9 13 18   CREATININE 0.7 0.9 0.7   GLUCOSE 102* 105* 106*     Recent Labs     08/21/24  0718 08/22/24  0451 08/23/24  0609   ALKPHOS 101 98 92   ALT 6 7 <5   AST 20 17 18   BILITOT 0.5 0.3 0.2       Recent Labs     08/22/24  0451   CRP 12.0*     Recent Labs     08/21/24  0718   PROCAL 0.08          CULTURES:     Results       Procedure Component Value Units Date/Time    Culture, Blood 1 [[id number]] Collected: 08/18/24 1526    Order Status: Completed Specimen: Blood Updated: 08/22/24 1832     Specimen Description .BLOOD     Special Requests          Culture NO GROWTH 4 DAYS    Culture, Blood 2 [[id number]]  (Abnormal) Collected: 08/18/24 1526    Order Status: Completed Specimen: Blood Updated: 08/20/24 1147     Specimen Description .BLOOD     Special Requests Site: Blood     [employer] test comment AEROBIC BLD BOTTLE     Direct Exam DIRECT GRAM STAIN FROM BOTTLE: GRAM POSITIVE COCCI IN CLUSTERS     Comment: Direct Gram Stain from bottle result called to and read back by: VICKI BRYANT RN   8/19/2024 AT 1407           Culture STAPHYLOCOCCUS SPECIES, COAGULASE NEGATIVE A single positive blood culture of coagulase negative Staphylocci, diphtheroids,micrococci, Cutibacterium, viridans Streptocci, Bacillus, or Lactobacillus species should be interpreted with caution and viewed as a likely skin contaminant.       Candida auris Not Detected     Candida albicans Not Detected     Candida glabrata Not Detected     Candida krusei Not Detected     Candida parapsilosis Not Detected     Candida tropicalis Not Detected     Cryptococcus neoformans/gattii Not Detected     Acinetobacter calcoaceticus-baumannii complex Not Detected

## 2024-08-23 NOTE — CARE COORDINATION
8/23/2024 1141a ()  NOTE: Pt currently on IV Doxy & IV Rocephin, however per nursing, will be switched to PO on dc. DC plan is to [facility]. NO PRECERT NEEDED. HENS completed. Pt is able to transfer via w/c. SW spoke with pt regarding transport to SNF on dc. Pt reports she does not have anyone that can transport her on dc. Pt is aware she may be charged for w/c transport and is ok to proceed. Ambulette form completed. SW notified SNF liaison Shannon that pt is for a potential DC on this date.     **Addend 320p** Pt to dc on this date. [facility] liaison notified. PAS ambulette arranged for 4pm p/up. Nursing, SNF liaison notified. CHRISTIAN attempted to reach dtr Judy by phone but was unable to reach.     Electronically signed by MAURILIO Boucher on 8/23/2024 at 11:45 AM

## 2024-08-23 NOTE — PROGRESS NOTES
Primary Care Physician: George Escamilla MD   Admitting Physician:  George Escamilla MD  Admission date and time: 8/18/2024  2:05 PM    Room:  [address] [place] [address]  Admitting diagnosis: Altered mental status, unspecified altered mental status type [R41.82]  Pneumonia due to infectious organism, unspecified laterality, unspecified part of lung [J18.9]  AMS (altered mental status) [R41.82]  Altered mental status [R41.82]    Patient Name: Charity Simpson  MRN: [id number]    Date of Service: 8/22/2024     Subjective:  Charity is a 84 y.o. female who was seen and examined today,8/22/2024, at the bedside.  Patient Had a good night sleep last night    NO family present during my examination.      ROS:  General:   Denies chills, fatigue, fever, malaise, night sweats or weight loss     Psychological:   Denies anxiety, disorientation or hallucinations     ENT:    Denies epistaxis, headaches, vertigo or visual changes     Cardiovascular:   Denies any chest pain, irregular heartbeats, or palpitations. No paroxysmal nocturnal dyspnea.     Respiratory:   Denies shortness of breath, coughing, sputum production, hemoptysis, or wheezing.  No orthopnea.     Gastrointestinal:   Denies nausea, vomiting, diarrhea, or constipation.  Denies any abdominal pain.  Denies change in bowel habits or stools.       Genito-Urinary:    Denies any urgency, frequency, hematuria.  Voiding without difficulty.     Musculoskeletal:   Denies joint pain, joint stiffness, joint swelling or muscle pain; ++ low back pain     Neurology:    Denies any headache. No weakness or paresthesia; ++ confusion     Derm:    Denies any rashes, ulcers, or excoriations.  Denies bruising.      Extremities:   Denies any lower extremity swelling or edema.    Physical Exam:  No intake/output data recorded.    Intake/Output Summary (Last 24 hours) at 8/22/2024 2004  Last data filed at 8/22/2024 0656  Gross per 24 hour   Intake --   Output 500 ml   Net -500 ml   I/O last 3 completed  shifts:  In: 160 [P.O.:160]  Out: 825 [Urine:825]  Patient Vitals for the past 96 hrs (Last 3 readings):   Weight   08/19/24 0600 61.7 kg (136 lb)   08/18/24 2045 61.7 kg (136 lb)     Vital Signs:   Blood pressure 122/60, pulse 89, temperature 97.4 °F (36.3 °C), temperature source Oral, resp. rate 18, height 1.702 m (5' 7.01\"), weight 61.7 kg (136 lb), SpO2 94%.      CONSTITUTIONAL:    Awake, alert, cooperative, no apparent distress, and appears stated age     EYES:    PERRL, EOMI, sclera clear, conjunctiva normal     ENT:    Normocephalic, atraumatic, sinuses nontender on palpation. External ears without lesions. Oral pharynx with moist mucus membranes.  Tonsils without erythema or exudates.     NECK:    Supple, symmetrical, trachea midline, no adenopathy, thyroid symmetric, not enlarged and no tenderness, skin normal, no bruits, no JVD     HEMATOLOGIC/LYMPHATICS:    No cervical lymphadenopathy and no supraclavicular lymphadenopathy     LUNGS:    Symmetric. No increased work of breathing, good air exchange, clear to auscultation bilaterally, no wheezes, rhonchi, or rales,      CARDIOVASCULAR:    Normal apical impulse, regular rate and rhythm, normal S1 and S2, no S3 or S4, and no murmur noted     ABDOMEN:    No scars, normal bowel sounds, soft, non-distended, non-tender, no masses palpated, no hepatosplenomegaly, no rebound or guarding elicited on palpation      MUSCULOSKELETAL:    There is no redness, warmth, or swelling of the joints.  Full range of motion noted.  Motor strength is 5 out of 5 all extremities bilaterally.  Tone is normal.     NEUROLOGIC:    Awake, alert, oriented to name, place and time.  Cranial nerves II-XII are grossly intact.  Motor is 5 out of 5 bilaterally.       SKIN:    No bruising or bleeding.  No redness, warmth, or swelling     EXTREMITIES:    Peripheral pulses present.  No edema, cyanosis, or swelling.       Medication:  Scheduled Meds:   acetaminophen  1,300 mg Oral 3 times per day       Component Value Date/Time    ALKPHOS 98 08/22/2024 04:51 AM    ALT 7 08/22/2024 04:51 AM    AST 17 08/22/2024 04:51 AM    BILITOT 0.3 08/22/2024 04:51 AM     Calcium:    Lab Results   Component Value Date/Time    CALCIUM 9.0 08/22/2024 04:51 AM       MRI BRAIN WO CONTRAST   Final Result   1. No acute intracranial abnormality.  No acute infarct.   2. Mild global parenchymal volume loss with chronic microvascular ischemic   changes.         CT CHEST WO CONTRAST   Final Result   1. No acute intrathoracic abnormality.  No acute airspace disease with   chronic changes as discussed above   2. Cardiomegaly.   3. Large hiatal hernia and patulous esophagus..   4. Biapical pleuroparenchymal scarring with components of calcified nodular   scarring at the apices.         CT HEAD WO CONTRAST   Final Result      1.  No evidence of acute intracranial process.      2.  Findings of presumed mild small vessel ischemic deep white matter disease.         CT LUMBAR SPINE WO CONTRAST   Final Result   1. No acute fracture or malalignment of the lumbar spine.   2. Moderate left convexity upper lumbar scoliosis measuring at least 41   degrees.   3. Moderate degenerative disc disease at all lumbar levels. Mild-to-moderate   facet arthropathy throughout the lower lumbar spine.   4. Mild chronic circumferential canal stenosis at L4-5 with the thecal sac   measuring 7 mm in AP diameter.         CT ABDOMEN PELVIS WO CONTRAST Additional Contrast? None   Final Result      1.  Large hiatal hernia.      2.  Distended but otherwise unremarkable appearing urinary bladder.      3.  Otherwise no acute pathology noted.         XR CHEST PORTABLE   Final Result   Increased density in the left lower lung base relate with at least in mild   degree of left-sided pleural effusion and areas of subsegmental atelectasis   in the left lower lobe.  Cannot exclude underline associated superimposed   pneumonic process in the left base.

## 2024-08-23 NOTE — PROGRESS NOTES
Physician Progress Note      PATIENT:               PRINCE BENNETT  CSN #:                  [id number]  :                       1940  ADMIT DATE:       2024 2:05 PM  DISCH DATE:  RESPONDING  PROVIDER #:        George Escmailla MD          QUERY TEXT:    Pt admitted with Pneumonia.  Pt noted to have some confusion and bacteremia.   If possible, please document in the progress notes and discharge summary if   you are evaluating and/or treating any of the following:    Note: CAP and HCAP indicate where the pneumonia was acquired, not a specific   type.    The medical record reflects the following:  Risk Factors: 84y.o. female; PMH of Esophageal dysphagia; Dementia  Clinical Indicators:  per H&P: Pneumonia  24: Per infectious Disease: Pneumonia due to infectious Organism.  staph   bacteremia prob contaminate.  Check ESR.CRP/Procal  24: WBC: 6.2;  Methicillin Resistance mecA/C buy PCR--not detected; Blood   culture: Staphylococcus species  24: Procalcitonin 0.08  24: CRP 12.0  Treatment: Infectious Disease consult; laboratory studies; Imaging; Rocephin   IV; Vibramycin IV; oxygen therapy protocol;    Thank You,  Lindsey Lackey BSN, R.N.  Clinical Documentation Integrity  [phone]  Options provided:  -- Gram negative pneumonia  -- MSSA pneumonia  -- Pneumonia due to unspecified bacteria  -- Viral pneumonia  -- Aspiration pneumonia  -- Other - I will add my own diagnosis  -- Disagree - Not applicable / Not valid  -- Disagree - Clinically unable to determine / Unknown  -- Refer to Clinical Documentation Reviewer    PROVIDER RESPONSE TEXT:    This patient has pneumonia due to unspecified bacteria    Query created by: Lindsey Lackey on 2024 12:05 PM      Electronically signed by:  George Escamilla MD 2024 11:41 AM

## 2024-08-23 NOTE — PROGRESS NOTES
Physical Therapy Treatment Note/Plan of Care    Room #:  0614/0614-02  Patient Name: Charity Simpson  YOB: 1940  MRN: [id number]    Date of Service: 8/23/2024     Tentative placement recommendation: Subacute Rehab  Equipment recommendation: To be determined      Evaluating Physical Therapist: Pete Tavarez, PT #[id number]      Specific Provider Orders/Date/Referring Provider :   08/18/24 2245    PT evaluation and treat  Start:  08/18/24 2245,   End:  08/18/24 2245,   ONE TIME,   Standing Count:  1 Occurrences,   R       George Escamilla MD    Admitting Diagnosis:   Altered mental status, unspecified altered mental status type [R41.82]  Pneumonia due to infectious organism, unspecified laterality, unspecified part of lung [J18.9]  AMS (altered mental status) [R41.82]  Altered mental status [R41.82]      Surgery: none        Patient Active Problem List   Diagnosis    Trauma    AMS (altered mental status)    Altered mental status    Pneumonia    Lactic acidosis    Hypercalcemia    Dementia (HCC)        ASSESSMENT of Current Deficits Patient exhibits decreased strength, balance, and endurance impairing functional mobility, transfers, gait distance, and tolerance to activity. Pt required MaxA for transfer with HHA with no AD, and pt was agreeable to seated exercises sitting up in chair. The patient will benefit from continued skilled therapy to increase strength and improve balance for safe functional mobility, to decrease risk of falls, and to meet goals at discharge.          PHYSICAL THERAPY  PLAN OF CARE       Physical therapy plan of care is established based on physician order,  patient diagnosis and clinical assessment    Current Treatment Recommendations:    -Bed Mobility: Lower and upper extremity exercises, and trunk control activities  -Sitting Balance: Incorporate reaching activities to activate trunk muscles , Hands on support to maintain midline , and Facilitate active trunk muscle engagement  Sitting EOB:  fair min A for tremors and balance   Dynamic Standing:  poor HHA, mod x2 Sitting EOB: fair   Dynamic Standing: poor +    Sitting EOB:  good -  Dynamic Standing: fair wheeled walker       Patient is Alert & Oriented x person, place, time, and situation (did not know the year but knew it was August) and follows directions    Sensation:  Patient  reports numbness bilateral lower extremities  , feet feel like they are burning   Edema:  no   Endurance: fair      Vitals: room air   Blood Pressure at rest  Blood Pressure during session    Heart Rate at rest  Heart Rate during session    SPO2 at rest %  SPO2 during session %     Patient education  Patient educated on role of Physical Therapy, risks of immobility, safety and plan of care,  importance of mobility while in hospital , ankle pumps, quad set and glut set for edema control, blood clot prevention, importance and purpose of adaptive device and adjusted to proper height for the patient., and safety      Patient response to education:   Pt verbalized understanding Pt demonstrated skill Pt requires further education in this area   Yes Partial Yes      Treatment:  Patient practiced and was instructed/facilitated in the following treatment: Patient assisted to EOB. Sat edge of bed 15 minutes with Minimal assist of 1 to increase dynamic sitting balance and activity tolerance. Seated exercises, bed mobility., seated exercises.      Therapeutic Exercises:  ankle pumps, heel raises, hip abduction/adduction, long arc quad, and seated marching  x 15 reps.     At end of session, patient in chair with alarm with call light and phone within reach,  all lines and tubes intact, nursing notified.      Patient would benefit from continued skilled Physical Therapy to improve functional independence and quality of life.         Patient's/ family goals   Return to Assisted Living Facility    Time in    1329  Time out  1352    Total Treatment Time  23 minutes      CPT  codes:  Therapeutic activities (62406)   15 minutes  1 unit(s)  Therapeutic exercises (67512)   8 minutes  1 unit(s)    Kenn Quispe PT

## 2024-08-27 NOTE — DISCHARGE SUMMARY
Internal Medicine  Discharge Summary    NAME: Charity Simpson  :  1940  MRN:  [id number]  PCP:George Escamilla MD    ADMITTED: 2024      DISCHARGED: 2024    ADMITTING PHYSICIAN:  George Escamilla MD    CONSULTANT(S):   IP CONSULT TO INTERNAL MEDICINE  IP CONSULT TO INFECTIOUS DISEASES     ADMITTING DIAGNOSIS:    Pneumonia  Altered mental statis  Lactic acidosis  Hypercalcemia  Elevated troponin  Anemia  Chronic low back pain  Rheumatoid arthritis  Bacteremia    DISCHARGE DIAGNOSES:   Same    HOSPITAL COURSE:   This is an 84 year old female patient that was recently admitted with reports of altered mental status.  She was found to have pneumonia left lung, lactic acidosis, and elevated troponin levels.  She was treated with IV Rocephin, IV Vibramycin, and IV fluids.  Over the course of the stay she has continued to show improvement.  She was awake and alert and back to her baseline.  She completed course of antibiotic therapy.  Infectious disease signed off and she was cleared for discharge.  Patient was seen and examined and offered no new complaints.  She was discharged to the nursing home in stable condition.    LABS::  Lab Results   Component Value Date    WBC 6.4 2024    HGB 10.4 (L) 2024    HCT 35.1 2024     2024     2024    K 4.1 2024     2024    CREATININE 0.7 2024    BUN 18 2024    CO2 29 2024    GLUCOSE 106 (H) 2024    ALT <5 2024    AST 18 2024       IMAGING:  MRI BRAIN WO CONTRAST   Final Result   1. No acute intracranial abnormality.  No acute infarct.   2. Mild global parenchymal volume loss with chronic microvascular ischemic   changes.         CT CHEST WO CONTRAST   Final Result   1. No acute intrathoracic abnormality.  No acute airspace disease with   chronic changes as discussed above   2. Cardiomegaly.   3. Large hiatal hernia and patulous esophagus..   4. Biapical  recurrence of symptoms, she is instructed to go to the ED.    Preparing for this patient's discharge, including paperwork, orders, instructions, and meeting with patient did require > 40 minutes.      Electronically signed by George Escamilla MD on 8/27/2024 at 10:41 AM

## 2024-08-30 NOTE — PROGRESS NOTES
Physician Progress Note      PATIENT:               PRINCE BENNETT  [facility] #:                  [id number]  :                       1940  ADMIT DATE:       2024 2:05 PM  DISCH DATE:        2024 4:08 PM  RESPONDING  PROVIDER #:        George Escamilla MD          QUERY TEXT:    Patient admitted with Unspecified bacterial Pneumonia. Noted to have Elevated   troponin levels.  If possible, please document in the progress notes and   discharge summary if you are evaluating and/or treating any of the following:    The medical record reflects the following:  Risk Factors: Elevated troponin; Unspecified bacterial pneumonia  Clinical Indicators:  Per ED Provider: Review of systems: Negative for chest pain, palpitations  Per H&P: Elevated Troponin.  Review of systems: Denies chest pain or SOB  24: Troponin, high sensitivity 21 ng/dL and a repeat of 21ng/DL  24: EKG:  Normal sinus rhythm with sinus arrhythmia.  Inferior infarct,   age undetermined.  Anteroseptal infarct, age undetermined.  Abnormal ECG  Treatment:  EKG, troponin levels X 2; IV fluids; Rocephin IV; Vibramycin IV    Thank You,  Lindsey Lackey BSN, R.N.  Clinical Documentation Integrity  [phone]  Options provided:  -- Elevated troponin without myocardial injury  -- Non-ischemic myocardial injury due Pneumonia  -- Demand Ischemia only, no MI  -- Other - I will add my own diagnosis  -- Disagree - Not applicable / Not valid  -- Disagree - Clinically unable to determine / Unknown  -- Refer to Clinical Documentation Reviewer    PROVIDER RESPONSE TEXT:    This patient has Elevated troponin without myocardial injury    Query created by: Lindsey Lackey on 2024 8:27 AM      QUERY TEXT:    Patient admitted with Pneumonia. Noted documentation of Staph bacteremia prob   contaminant per infectious Disease consult. In order to support the diagnosis   of Bacteremia, please include additional clinical indicators in your   documentation.  Or  as evidenced by, Please document evidence.  -- Lactic Acidosis was ruled out  -- Other - I will add my own diagnosis  -- Disagree - Not applicable / Not valid  -- Disagree - Clinically unable to determine / Unknown  -- Refer to Clinical Documentation Reviewer    PROVIDER RESPONSE TEXT:    Lactic Acidosis is present as evidenced by from laboratory results    Query created by: Lindsey Lackey on 8/30/2024 8:31 AM      Electronically signed by:  George Escamilla MD 8/30/2024 10:00 AM

## 2024-09-04 NOTE — PROGRESS NOTES
Physician Progress Note      PATIENT:               PRINCE BENNETT  CSN #:                  [id number]  :                       1940  ADMIT DATE:       2024 2:05 PM  DISCH DATE:        2024 4:08 PM  RESPONDING  PROVIDER #:        George Escamilla MD          QUERY TEXT:    Patient was noted to have altered mental status.  Diagnosed with unspecified   bacterial Pneumonia.  After study, can the altered mental status be further specified as:    The medical record reflects the following:  Risk Factors: Unspecified bacterial pneumonia,  Clinical Indicators:  Per ED Physical exam: oriented X 2  24: H&P: Altered Mental Status: Physical Exam: Awake, alert, cooperative  24: Per infectious Disease consult: physical exam: alert and oriented X 3  24: Per nursing note: Oriented X 4  24  Per nursing note @ 19:30: Disoriented to time  24: Per nursing note: Disoriented to time  24: Per nursing note: disoriented to time  24: Per nursing note: Disoriented to time and situation  24: Chest X-ray: Increased density in the left lower lung base, mild   degree of left-sided pleural effusion  24: CT Head: findings presumed mild small vessel ischemic deep white   matter disease  24: Procalcitonin 0.08;  24: CRP 12.0  Treatment: Infectious Disease consult; Imaging; laboratory studies; IV fluid   bolus; Vibramycin IV; Rocephin IV    Thank you,  Lindsey VASQUESN, R.N.  Clinical Documentation Integrity  [phone]  Options provided:  -- Altered mental status due to acute confusional state related to pneumonia  -- Altered Mental Status due to progression of dementia  -- Metabolic Encephalopathy due to Pneumonia  -- Other - I will add my own diagnosis  -- Disagree - Not applicable / Not valid  -- Disagree - Clinically unable to determine / Unknown  -- Refer to Clinical Documentation Reviewer    PROVIDER RESPONSE TEXT:    This patient has Altered mental status due to

## 2024-10-08 ENCOUNTER — HOSPITAL ENCOUNTER (EMERGENCY)
Age: 84
Discharge: HOME | End: 2024-10-09
Attending: EMERGENCY MEDICINE
Payer: MEDICARE

## 2024-10-08 ENCOUNTER — APPOINTMENT (OUTPATIENT)
Dept: CT IMAGING | Age: 84
End: 2024-10-08
Payer: MEDICARE

## 2024-10-08 VITALS
SYSTOLIC BLOOD PRESSURE: 144 MMHG | WEIGHT: 137 LBS | TEMPERATURE: 97.8 F | DIASTOLIC BLOOD PRESSURE: 69 MMHG | BODY MASS INDEX: 21.5 KG/M2 | HEART RATE: 83 BPM | RESPIRATION RATE: 21 BRPM | HEIGHT: 67 IN | OXYGEN SATURATION: 98 %
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
## 2024-10-08 LAB
ALBUMIN SERPL-MCNC: 3.4 G/DL (ref 3.5–5.2)
ALP SERPL-CCNC: 146 U/L (ref 35–104)
ALT SERPL-CCNC: 8 U/L (ref 0–32)
ANION GAP SERPL CALCULATED.3IONS-SCNC: 12 MMOL/L (ref 7–16)
AST SERPL-CCNC: 19 U/L (ref 0–31)
BASOPHILS # BLD: 0.04 K/UL (ref 0–0.2)
BASOPHILS NFR BLD: 1 % (ref 0–2)
BILIRUB SERPL-MCNC: 0.4 MG/DL (ref 0–1.2)
BILIRUB UR QL STRIP: NEGATIVE
BUN SERPL-MCNC: 14 MG/DL (ref 6–23)
CALCIUM SERPL-MCNC: 9.1 MG/DL (ref 8.6–10.2)
CHLORIDE SERPL-SCNC: 99 MMOL/L (ref 98–107)
CLARITY UR: CLEAR
CO2 SERPL-SCNC: 27 MMOL/L (ref 22–29)
COLOR UR: YELLOW
CREAT SERPL-MCNC: 0.9 MG/DL (ref 0.5–1)
EOSINOPHIL # BLD: 0.11 K/UL (ref 0.05–0.5)
EOSINOPHILS RELATIVE PERCENT: 1 % (ref 0–6)
ERYTHROCYTE [DISTWIDTH] IN BLOOD BY AUTOMATED COUNT: 17.7 % (ref 11.5–15)
GFR, ESTIMATED: 65 ML/MIN/1.73M2
GLUCOSE SERPL-MCNC: 102 MG/DL (ref 74–99)
GLUCOSE UR STRIP-MCNC: NEGATIVE MG/DL
HCT VFR BLD AUTO: 31.4 % (ref 34–48)
HGB BLD-MCNC: 9.6 G/DL (ref 11.5–15.5)
HGB UR QL STRIP.AUTO: NEGATIVE
IMM GRANULOCYTES # BLD AUTO: <0.03 K/UL (ref 0–0.58)
IMM GRANULOCYTES NFR BLD: 0 % (ref 0–5)
KETONES UR STRIP-MCNC: NEGATIVE MG/DL
LEUKOCYTE ESTERASE UR QL STRIP: NEGATIVE
LYMPHOCYTES NFR BLD: 1.52 K/UL (ref 1.5–4)
LYMPHOCYTES RELATIVE PERCENT: 18 % (ref 20–42)
MCH RBC QN AUTO: 27.7 PG (ref 26–35)
MCHC RBC AUTO-ENTMCNC: 30.6 G/DL (ref 32–34.5)
MCV RBC AUTO: 90.8 FL (ref 80–99.9)
MONOCYTES NFR BLD: 0.63 K/UL (ref 0.1–0.95)
MONOCYTES NFR BLD: 7 % (ref 2–12)
NEUTROPHILS NFR BLD: 73 % (ref 43–80)
NEUTS SEG NFR BLD: 6.17 K/UL (ref 1.8–7.3)
NITRITE UR QL STRIP: NEGATIVE
PH UR STRIP: 7 [PH] (ref 5–9)
PLATELET # BLD AUTO: 342 K/UL (ref 130–450)
PMV BLD AUTO: 9.4 FL (ref 7–12)
POTASSIUM SERPL-SCNC: 4.3 MMOL/L (ref 3.5–5)
PROT SERPL-MCNC: 7 G/DL (ref 6.4–8.3)
PROT UR STRIP-MCNC: NEGATIVE MG/DL
RBC # BLD AUTO: 3.46 M/UL (ref 3.5–5.5)
RBC #/AREA URNS HPF: NORMAL /HPF
SODIUM SERPL-SCNC: 138 MMOL/L (ref 132–146)
SP GR UR STRIP: 1.01 (ref 1–1.03)
UROBILINOGEN UR STRIP-ACNC: 0.2 EU/DL (ref 0–1)
WBC #/AREA URNS HPF: NORMAL /HPF
WBC OTHER # BLD: 8.5 K/UL (ref 4.5–11.5)

## 2024-10-08 PROCEDURE — 6370000000 HC RX 637 (ALT 250 FOR IP): Performed by: STUDENT IN AN ORGANIZED HEALTH CARE EDUCATION/TRAINING PROGRAM

## 2024-10-08 PROCEDURE — 85025 COMPLETE CBC W/AUTO DIFF WBC: CPT

## 2024-10-08 PROCEDURE — 87086 URINE CULTURE/COLONY COUNT: CPT

## 2024-10-08 PROCEDURE — 99284 EMERGENCY DEPT VISIT MOD MDM: CPT

## 2024-10-08 PROCEDURE — 80053 COMPREHEN METABOLIC PANEL: CPT

## 2024-10-08 PROCEDURE — 72125 CT NECK SPINE W/O DYE: CPT

## 2024-10-08 PROCEDURE — 70450 CT HEAD/BRAIN W/O DYE: CPT

## 2024-10-08 PROCEDURE — 81001 URINALYSIS AUTO W/SCOPE: CPT

## 2024-10-08 RX ORDER — ACETAMINOPHEN 500 MG
1000 TABLET ORAL ONCE
Status: COMPLETED | OUTPATIENT
Start: 2024-10-08 | End: 2024-10-08

## 2024-10-08 RX ORDER — TRAMADOL HYDROCHLORIDE 50 MG/1
50 TABLET ORAL ONCE
Status: COMPLETED | OUTPATIENT
Start: 2024-10-08 | End: 2024-10-08

## 2024-10-08 RX ADMIN — TRAMADOL HYDROCHLORIDE 50 MG: 50 TABLET ORAL at 21:52

## 2024-10-08 RX ADMIN — ACETAMINOPHEN 1000 MG: 500 TABLET ORAL at 22:40

## 2024-10-08 ASSESSMENT — PAIN DESCRIPTION - ORIENTATION
ORIENTATION: RIGHT;LEFT
ORIENTATION: RIGHT;LEFT;LOWER

## 2024-10-08 ASSESSMENT — PAIN DESCRIPTION - DESCRIPTORS
DESCRIPTORS: DULL;ACHING;SHARP
DESCRIPTORS: ACHING;DULL;SHARP

## 2024-10-08 ASSESSMENT — PAIN DESCRIPTION - LOCATION
LOCATION: BACK;LEG
LOCATION: BACK
LOCATION: BACK;LEG

## 2024-10-08 ASSESSMENT — PAIN SCALES - GENERAL
PAINLEVEL_OUTOF10: 10
PAINLEVEL_OUTOF10: 10
PAINLEVEL_OUTOF10: 8

## 2024-10-08 ASSESSMENT — PAIN DESCRIPTION - PAIN TYPE: TYPE: ACUTE PAIN

## 2024-10-08 ASSESSMENT — PAIN - FUNCTIONAL ASSESSMENT: PAIN_FUNCTIONAL_ASSESSMENT: 0-10

## 2024-10-08 NOTE — ED NOTES
Daughter Michelle called and updated per pt request. Daughter states she is aware of pt eval at ED and that she will not come to  pt prior to discharge. Daughter also states pt is wheelchair bound and has to go back to [facility] via ambulance.

## 2024-10-08 NOTE — ED PROVIDER NOTES
84-year-old female presenting from nursing home.  She arrives via EMS.  Reportedly has had multiple falls at the facility.  Fell previously and has a left ankle fracture and wears a boot on that ankle.  She reportedly walks with the boot.  She has history dementia and is alert and oriented x 2.  She is not sure why she is in the emergency room now.  What she remembers is that she had back pain and asked for Tylenol which she got.  She asked for another Tylenol but said it was too soon for them to give her another 1.  Nursing home reports that she is on chronic fentanyl patches for this.         Family History   Problem Relation Age of Onset    Heart Disease Mother     Cancer Father         melanoma     Past Surgical History:   Procedure Laterality Date    ABDOMEN SURGERY         Review of Systems   Unable to perform ROS: Dementia        Physical Exam  Constitutional:       General: She is not in acute distress.     Appearance: She is well-developed.      Comments: Thin, frail   HENT:      Head: Normocephalic and atraumatic.   Eyes:      Conjunctiva/sclera: Conjunctivae normal.      Pupils: Pupils are equal, round, and reactive to light.   Neck:      Thyroid: No thyromegaly.   Cardiovascular:      Rate and Rhythm: Normal rate and regular rhythm.   Pulmonary:      Effort: Pulmonary effort is normal. No respiratory distress.      Breath sounds: Normal breath sounds.   Abdominal:      General: There is no distension.      Palpations: Abdomen is soft.      Tenderness: There is no abdominal tenderness. There is no guarding or rebound.   Musculoskeletal:         General: Tenderness present. Normal range of motion.      Cervical back: Normal range of motion.      Comments: Tenderness to the left lower back and upper buttock area    Boot on the left lower leg    Skin:     General: Skin is warm and dry.      Findings: No erythema.   Neurological:      Mental Status: She is alert. Mental status is at baseline. She is

## 2024-10-09 LAB
MICROORGANISM SPEC CULT: NO GROWTH
SERVICE CMNT-IMP: NORMAL
SPECIMEN DESCRIPTION: NORMAL

## 2024-10-09 NOTE — DISCHARGE INSTR - COC
Continuity of Care Form    Patient Name: Charity Simpson   :  1940  MRN:  [id number]    Admit date:  10/8/2024  Discharge date:  ***    Code Status Order: Prior   Advance Directives:   Advance Care Flowsheet Documentation             Admitting Physician:  No admitting provider for patient encounter.  PCP: Geroge Escamilla MD    Discharging Nurse: ***  Discharging Hospital Unit/Room#: [facility]INA  Discharging Unit Phone Number: ***    Emergency Contact:   Extended Emergency Contact Information  Primary Emergency Contact: Michelle Ramirez  Home Phone: [phone]  Work Phone: [phone]  Mobile Phone: [phone]  Relation: Child  Preferred language: English   needed? No    Past Surgical History:  Past Surgical History:   Procedure Laterality Date    ABDOMEN SURGERY         Immunization History:   Immunization History   Administered Date(s) Administered    COVID-19, PFIZER Bivalent, DO NOT Dilute, (age 12y+), IM, 30 mcg/0.3 mL 10/05/2022    COVID-19, PFIZER PURPLE top, DILUTE for use, (age 12 y+), 30mcg/0.3mL 2020, 2021, 10/20/2021    Influenza, FLUAD, (age 65 y+), IM, Quadv, 0.5mL 2023    Influenza, FLUZONE High Dose, (age 65 y+), IM, Trivalent PF, 0.5mL 10/16/2019    Pneumococcal, PPSV23, PNEUMOVAX 23, (age 2y+), SC/IM, 0.5mL 09/15/2011    Zoster Live (Zostavax) 2014       Active Problems:  Patient Active Problem List   Diagnosis Code    Trauma T14.90XA    AMS (altered mental status) R41.82    Altered mental status R41.82    Pneumonia J18.9    Lactic acidosis E87.20    Hypercalcemia E83.52    Dementia (HCC) F03.90       Isolation/Infection:   Isolation            No Isolation          Patient Infection Status       None to display                     Nurse Assessment:  Last Vital Signs: BP (!) 144/69   Pulse 83   Temp 97.8 °F (36.6 °C) (Oral)   Resp 21   Ht 1.702 m (5' 7\")   Wt 62.1 kg (137 lb)   SpO2 98%   BMI 21.46 kg/m²     Last documented pain score (0-10 scale):

## 2025-08-25 ENCOUNTER — HOSPITAL ENCOUNTER (INPATIENT)
Age: 85
LOS: 2 days | Discharge: NURSING, REHAB OR LONG-TERM CARE | DRG: 092 | End: 2025-08-27
Attending: EMERGENCY MEDICINE | Admitting: INTERNAL MEDICINE
Payer: MEDICARE

## 2025-08-25 ENCOUNTER — APPOINTMENT (OUTPATIENT)
Dept: CT IMAGING | Age: 85
DRG: 092 | End: 2025-08-25
Payer: MEDICARE

## 2025-08-25 DIAGNOSIS — M54.50 ACUTE BILATERAL LOW BACK PAIN WITHOUT SCIATICA: Primary | ICD-10-CM

## 2025-08-25 DIAGNOSIS — M48.061 SPINAL STENOSIS AT L4-L5 LEVEL: ICD-10-CM

## 2025-08-25 DIAGNOSIS — R25.1 TREMOR: ICD-10-CM

## 2025-08-25 LAB
ANION GAP SERPL CALCULATED.3IONS-SCNC: 12 MMOL/L (ref 7–16)
BASOPHILS # BLD: 0.06 K/UL (ref 0–0.2)
BASOPHILS NFR BLD: 1 % (ref 0–2)
BILIRUB UR QL STRIP: NEGATIVE
BUN SERPL-MCNC: 15 MG/DL (ref 8–23)
CALCIUM SERPL-MCNC: 9.8 MG/DL (ref 8.8–10.2)
CHLORIDE SERPL-SCNC: 102 MMOL/L (ref 98–107)
CLARITY UR: CLEAR
CO2 SERPL-SCNC: 25 MMOL/L (ref 22–29)
COLOR UR: YELLOW
COMMENT: NORMAL
CREAT SERPL-MCNC: 1.1 MG/DL (ref 0.5–1)
EOSINOPHIL # BLD: 0.24 K/UL (ref 0.05–0.5)
EOSINOPHILS RELATIVE PERCENT: 3 % (ref 0–6)
ERYTHROCYTE [DISTWIDTH] IN BLOOD BY AUTOMATED COUNT: 17.2 % (ref 11.5–15)
GFR, ESTIMATED: 51 ML/MIN/1.73M2
GLUCOSE SERPL-MCNC: 96 MG/DL (ref 74–99)
GLUCOSE UR STRIP-MCNC: NEGATIVE MG/DL
HCT VFR BLD AUTO: 33.7 % (ref 34–48)
HGB BLD-MCNC: 10.2 G/DL (ref 11.5–15.5)
HGB UR QL STRIP.AUTO: NEGATIVE
IMM GRANULOCYTES # BLD AUTO: <0.03 K/UL (ref 0–0.58)
IMM GRANULOCYTES NFR BLD: 0 % (ref 0–5)
KETONES UR STRIP-MCNC: NEGATIVE MG/DL
LEUKOCYTE ESTERASE UR QL STRIP: NEGATIVE
LYMPHOCYTES NFR BLD: 1.74 K/UL (ref 1.5–4)
LYMPHOCYTES RELATIVE PERCENT: 25 % (ref 20–42)
MAGNESIUM SERPL-MCNC: 2.1 MG/DL (ref 1.6–2.4)
MCH RBC QN AUTO: 26 PG (ref 26–35)
MCHC RBC AUTO-ENTMCNC: 30.3 G/DL (ref 32–34.5)
MCV RBC AUTO: 85.8 FL (ref 80–99.9)
MONOCYTES NFR BLD: 0.73 K/UL (ref 0.1–0.95)
MONOCYTES NFR BLD: 10 % (ref 2–12)
NEUTROPHILS NFR BLD: 60 % (ref 43–80)
NEUTS SEG NFR BLD: 4.25 K/UL (ref 1.8–7.3)
NITRITE UR QL STRIP: NEGATIVE
PH UR STRIP: 6 [PH] (ref 5–8)
PLATELET # BLD AUTO: 284 K/UL (ref 130–450)
PMV BLD AUTO: 9 FL (ref 7–12)
POTASSIUM SERPL-SCNC: 4.5 MMOL/L (ref 3.5–5.1)
PROLACTIN SERPL-MCNC: 14.4 NG/ML
PROT UR STRIP-MCNC: NEGATIVE MG/DL
RBC # BLD AUTO: 3.93 M/UL (ref 3.5–5.5)
SODIUM SERPL-SCNC: 139 MMOL/L (ref 136–145)
SP GR UR STRIP: 1.01 (ref 1–1.03)
UROBILINOGEN UR STRIP-ACNC: 0.2 EU/DL (ref 0–1)
WBC OTHER # BLD: 7 K/UL (ref 4.5–11.5)

## 2025-08-25 PROCEDURE — 6370000000 HC RX 637 (ALT 250 FOR IP): Performed by: EMERGENCY MEDICINE

## 2025-08-25 PROCEDURE — 83735 ASSAY OF MAGNESIUM: CPT

## 2025-08-25 PROCEDURE — 72128 CT CHEST SPINE W/O DYE: CPT

## 2025-08-25 PROCEDURE — 2060000000 HC ICU INTERMEDIATE R&B

## 2025-08-25 PROCEDURE — 6360000002 HC RX W HCPCS: Performed by: EMERGENCY MEDICINE

## 2025-08-25 PROCEDURE — 84146 ASSAY OF PROLACTIN: CPT

## 2025-08-25 PROCEDURE — 99285 EMERGENCY DEPT VISIT HI MDM: CPT

## 2025-08-25 PROCEDURE — 80048 BASIC METABOLIC PNL TOTAL CA: CPT

## 2025-08-25 PROCEDURE — 96374 THER/PROPH/DIAG INJ IV PUSH: CPT

## 2025-08-25 PROCEDURE — 81003 URINALYSIS AUTO W/O SCOPE: CPT

## 2025-08-25 PROCEDURE — 87086 URINE CULTURE/COLONY COUNT: CPT

## 2025-08-25 PROCEDURE — 72131 CT LUMBAR SPINE W/O DYE: CPT

## 2025-08-25 PROCEDURE — 70450 CT HEAD/BRAIN W/O DYE: CPT

## 2025-08-25 PROCEDURE — 85025 COMPLETE CBC W/AUTO DIFF WBC: CPT

## 2025-08-25 RX ORDER — FENTANYL CITRATE 0.05 MG/ML
25 INJECTION, SOLUTION INTRAMUSCULAR; INTRAVENOUS ONCE
Refills: 0 | Status: COMPLETED | OUTPATIENT
Start: 2025-08-25 | End: 2025-08-25

## 2025-08-25 RX ORDER — ACETAMINOPHEN 500 MG
1000 TABLET ORAL ONCE
Status: COMPLETED | OUTPATIENT
Start: 2025-08-25 | End: 2025-08-25

## 2025-08-25 RX ORDER — LORAZEPAM 2 MG/ML
0.5 INJECTION INTRAMUSCULAR ONCE
Status: DISCONTINUED | OUTPATIENT
Start: 2025-08-25 | End: 2025-08-25

## 2025-08-25 RX ADMIN — FENTANYL CITRATE 25 MCG: 0.05 INJECTION, SOLUTION INTRAMUSCULAR; INTRAVENOUS at 17:30

## 2025-08-25 RX ADMIN — ACETAMINOPHEN 1000 MG: 500 TABLET ORAL at 22:20

## 2025-08-25 ASSESSMENT — PAIN - FUNCTIONAL ASSESSMENT: PAIN_FUNCTIONAL_ASSESSMENT: 0-10

## 2025-08-25 ASSESSMENT — PAIN SCALES - GENERAL: PAINLEVEL_OUTOF10: 10

## 2025-08-25 ASSESSMENT — ENCOUNTER SYMPTOMS
VOMITING: 0
NAUSEA: 0
EYE REDNESS: 0
ABDOMINAL PAIN: 0
SHORTNESS OF BREATH: 0

## 2025-08-25 ASSESSMENT — PAIN DESCRIPTION - LOCATION: LOCATION: BACK

## 2025-08-26 PROBLEM — R25.9 ABNORMAL INVOLUNTARY MOVEMENT: Status: ACTIVE | Noted: 2025-08-26

## 2025-08-26 PROBLEM — M47.816 DEGENERATIVE JOINT DISEASE (DJD) OF LUMBAR SPINE: Chronic | Status: ACTIVE | Noted: 2025-08-26

## 2025-08-26 PROBLEM — M47.816 DEGENERATIVE JOINT DISEASE (DJD) OF LUMBAR SPINE: Status: ACTIVE | Noted: 2025-08-26

## 2025-08-26 PROBLEM — M48.061 SPINAL STENOSIS AT L4-L5 LEVEL: Status: ACTIVE | Noted: 2025-08-26

## 2025-08-26 LAB
BILIRUB UR QL STRIP: NEGATIVE
CLARITY UR: CLEAR
COLOR UR: YELLOW
GLUCOSE UR STRIP-MCNC: NEGATIVE MG/DL
HGB UR QL STRIP.AUTO: NEGATIVE
KETONES UR STRIP-MCNC: NEGATIVE MG/DL
LEUKOCYTE ESTERASE UR QL STRIP: NEGATIVE
MICROORGANISM SPEC CULT: ABNORMAL
MICROORGANISM SPEC CULT: ABNORMAL
NITRITE UR QL STRIP: NEGATIVE
PH UR STRIP: 6.5 [PH] (ref 5–8)
PROT UR STRIP-MCNC: NEGATIVE MG/DL
RBC #/AREA URNS HPF: ABNORMAL /HPF
SERVICE CMNT-IMP: ABNORMAL
SP GR UR STRIP: <1.005 (ref 1–1.03)
SPECIMEN DESCRIPTION: ABNORMAL
UROBILINOGEN UR STRIP-ACNC: 0.2 EU/DL (ref 0–1)
WBC #/AREA URNS HPF: ABNORMAL /HPF

## 2025-08-26 PROCEDURE — 81001 URINALYSIS AUTO W/SCOPE: CPT

## 2025-08-26 PROCEDURE — 6370000000 HC RX 637 (ALT 250 FOR IP): Performed by: INTERNAL MEDICINE

## 2025-08-26 PROCEDURE — 97165 OT EVAL LOW COMPLEX 30 MIN: CPT

## 2025-08-26 PROCEDURE — 6360000002 HC RX W HCPCS: Performed by: INTERNAL MEDICINE

## 2025-08-26 PROCEDURE — 2060000000 HC ICU INTERMEDIATE R&B

## 2025-08-26 PROCEDURE — 87086 URINE CULTURE/COLONY COUNT: CPT

## 2025-08-26 PROCEDURE — 51702 INSERT TEMP BLADDER CATH: CPT

## 2025-08-26 PROCEDURE — 2500000003 HC RX 250 WO HCPCS: Performed by: INTERNAL MEDICINE

## 2025-08-26 PROCEDURE — 97535 SELF CARE MNGMENT TRAINING: CPT

## 2025-08-26 PROCEDURE — 84481 FREE ASSAY (FT-3): CPT

## 2025-08-26 PROCEDURE — G0425 INPT/ED TELECONSULT30: HCPCS | Performed by: PSYCHIATRY & NEUROLOGY

## 2025-08-26 RX ORDER — DONEPEZIL HYDROCHLORIDE 5 MG/1
5 TABLET, FILM COATED ORAL NIGHTLY
Status: DISCONTINUED | OUTPATIENT
Start: 2025-08-26 | End: 2025-08-27 | Stop reason: HOSPADM

## 2025-08-26 RX ORDER — MECLIZINE HCL 12.5 MG 12.5 MG/1
12.5 TABLET ORAL 3 TIMES DAILY
Status: DISCONTINUED | OUTPATIENT
Start: 2025-08-26 | End: 2025-08-27 | Stop reason: HOSPADM

## 2025-08-26 RX ORDER — VITAMIN B COMPLEX
1000 TABLET ORAL 2 TIMES DAILY
Status: DISCONTINUED | OUTPATIENT
Start: 2025-08-26 | End: 2025-08-27 | Stop reason: HOSPADM

## 2025-08-26 RX ORDER — DONEPEZIL HYDROCHLORIDE 5 MG/1
10 TABLET, FILM COATED ORAL NIGHTLY
Status: DISCONTINUED | OUTPATIENT
Start: 2025-08-26 | End: 2025-08-26

## 2025-08-26 RX ORDER — DOCUSATE SODIUM 100 MG/1
100 CAPSULE, LIQUID FILLED ORAL 2 TIMES DAILY
Status: DISCONTINUED | OUTPATIENT
Start: 2025-08-26 | End: 2025-08-27 | Stop reason: HOSPADM

## 2025-08-26 RX ORDER — MEMANTINE HYDROCHLORIDE 10 MG/1
10 TABLET ORAL 2 TIMES DAILY
Status: DISCONTINUED | OUTPATIENT
Start: 2025-08-26 | End: 2025-08-27 | Stop reason: HOSPADM

## 2025-08-26 RX ORDER — DULOXETIN HYDROCHLORIDE 30 MG/1
30 CAPSULE, DELAYED RELEASE ORAL
Status: DISCONTINUED | OUTPATIENT
Start: 2025-08-26 | End: 2025-08-26

## 2025-08-26 RX ORDER — POTASSIUM CHLORIDE 1500 MG/1
40 TABLET, EXTENDED RELEASE ORAL PRN
Status: DISCONTINUED | OUTPATIENT
Start: 2025-08-26 | End: 2025-08-27 | Stop reason: HOSPADM

## 2025-08-26 RX ORDER — MECOBALAMIN 5000 MCG
5 TABLET,DISINTEGRATING ORAL NIGHTLY
Status: DISCONTINUED | OUTPATIENT
Start: 2025-08-26 | End: 2025-08-27 | Stop reason: HOSPADM

## 2025-08-26 RX ORDER — METHOCARBAMOL 500 MG/1
500 TABLET, FILM COATED ORAL 3 TIMES DAILY
COMMUNITY

## 2025-08-26 RX ORDER — GABAPENTIN 400 MG/1
400 CAPSULE ORAL 3 TIMES DAILY
Status: DISCONTINUED | OUTPATIENT
Start: 2025-08-26 | End: 2025-08-26

## 2025-08-26 RX ORDER — POLYETHYLENE GLYCOL 3350 17 G/17G
17 POWDER, FOR SOLUTION ORAL DAILY PRN
Status: DISCONTINUED | OUTPATIENT
Start: 2025-08-26 | End: 2025-08-27 | Stop reason: HOSPADM

## 2025-08-26 RX ORDER — DULOXETIN HYDROCHLORIDE 20 MG/1
20 CAPSULE, DELAYED RELEASE ORAL
Status: DISCONTINUED | OUTPATIENT
Start: 2025-08-26 | End: 2025-08-27 | Stop reason: HOSPADM

## 2025-08-26 RX ORDER — FOLIC ACID 1 MG/1
1000 TABLET ORAL DAILY
Status: DISCONTINUED | OUTPATIENT
Start: 2025-08-26 | End: 2025-08-27 | Stop reason: HOSPADM

## 2025-08-26 RX ORDER — METHOCARBAMOL 500 MG/1
500 TABLET, FILM COATED ORAL 3 TIMES DAILY
Status: DISCONTINUED | OUTPATIENT
Start: 2025-08-26 | End: 2025-08-27 | Stop reason: HOSPADM

## 2025-08-26 RX ORDER — TEMAZEPAM 15 MG/1
15 CAPSULE ORAL NIGHTLY
Status: DISCONTINUED | OUTPATIENT
Start: 2025-08-26 | End: 2025-08-27 | Stop reason: HOSPADM

## 2025-08-26 RX ORDER — SODIUM CHLORIDE 0.9 % (FLUSH) 0.9 %
5-40 SYRINGE (ML) INJECTION EVERY 12 HOURS SCHEDULED
Status: DISCONTINUED | OUTPATIENT
Start: 2025-08-26 | End: 2025-08-27 | Stop reason: HOSPADM

## 2025-08-26 RX ORDER — POTASSIUM CHLORIDE 7.45 MG/ML
10 INJECTION INTRAVENOUS PRN
Status: DISCONTINUED | OUTPATIENT
Start: 2025-08-26 | End: 2025-08-27 | Stop reason: HOSPADM

## 2025-08-26 RX ORDER — OXYBUTYNIN CHLORIDE 5 MG/1
5 TABLET ORAL 3 TIMES DAILY
Status: DISCONTINUED | OUTPATIENT
Start: 2025-08-26 | End: 2025-08-27 | Stop reason: HOSPADM

## 2025-08-26 RX ORDER — POLYETHYLENE GLYCOL 3350 17 G/17G
17 POWDER, FOR SOLUTION ORAL DAILY
Status: DISCONTINUED | OUTPATIENT
Start: 2025-08-26 | End: 2025-08-27 | Stop reason: HOSPADM

## 2025-08-26 RX ORDER — ONDANSETRON 4 MG/1
4 TABLET, FILM COATED ORAL EVERY 8 HOURS PRN
COMMUNITY

## 2025-08-26 RX ORDER — FENTANYL CITRATE 0.05 MG/ML
25 INJECTION, SOLUTION INTRAMUSCULAR; INTRAVENOUS ONCE
Status: COMPLETED | OUTPATIENT
Start: 2025-08-26 | End: 2025-08-26

## 2025-08-26 RX ORDER — SODIUM CHLORIDE 9 MG/ML
INJECTION, SOLUTION INTRAVENOUS PRN
Status: DISCONTINUED | OUTPATIENT
Start: 2025-08-26 | End: 2025-08-27 | Stop reason: HOSPADM

## 2025-08-26 RX ORDER — ACETAMINOPHEN 325 MG/1
650 TABLET ORAL EVERY 6 HOURS PRN
Status: DISCONTINUED | OUTPATIENT
Start: 2025-08-26 | End: 2025-08-27 | Stop reason: SDUPTHER

## 2025-08-26 RX ORDER — ONDANSETRON 4 MG/1
4 TABLET, ORALLY DISINTEGRATING ORAL EVERY 8 HOURS PRN
Status: DISCONTINUED | OUTPATIENT
Start: 2025-08-26 | End: 2025-08-27 | Stop reason: HOSPADM

## 2025-08-26 RX ORDER — ACETAMINOPHEN 500 MG
500 TABLET ORAL EVERY 6 HOURS PRN
COMMUNITY

## 2025-08-26 RX ORDER — ACETAMINOPHEN 650 MG/1
650 SUPPOSITORY RECTAL EVERY 6 HOURS PRN
Status: DISCONTINUED | OUTPATIENT
Start: 2025-08-26 | End: 2025-08-27 | Stop reason: HOSPADM

## 2025-08-26 RX ORDER — DONEPEZIL HYDROCHLORIDE 10 MG/1
10 TABLET, FILM COATED ORAL NIGHTLY
Status: ON HOLD | COMMUNITY
End: 2025-08-27 | Stop reason: HOSPADM

## 2025-08-26 RX ORDER — ACETAMINOPHEN 500 MG
500 TABLET ORAL EVERY 6 HOURS PRN
Status: DISCONTINUED | OUTPATIENT
Start: 2025-08-26 | End: 2025-08-27 | Stop reason: HOSPADM

## 2025-08-26 RX ORDER — SODIUM CHLORIDE 0.9 % (FLUSH) 0.9 %
5-40 SYRINGE (ML) INJECTION PRN
Status: DISCONTINUED | OUTPATIENT
Start: 2025-08-26 | End: 2025-08-27 | Stop reason: HOSPADM

## 2025-08-26 RX ORDER — GABAPENTIN 100 MG/1
200 CAPSULE ORAL 3 TIMES DAILY
Status: DISCONTINUED | OUTPATIENT
Start: 2025-08-26 | End: 2025-08-27 | Stop reason: HOSPADM

## 2025-08-26 RX ORDER — ONDANSETRON 4 MG/1
4 TABLET, FILM COATED ORAL EVERY 8 HOURS PRN
Status: DISCONTINUED | OUTPATIENT
Start: 2025-08-26 | End: 2025-08-26 | Stop reason: SDUPTHER

## 2025-08-26 RX ORDER — ENOXAPARIN SODIUM 100 MG/ML
40 INJECTION SUBCUTANEOUS DAILY
Status: DISCONTINUED | OUTPATIENT
Start: 2025-08-26 | End: 2025-08-27 | Stop reason: HOSPADM

## 2025-08-26 RX ORDER — ONDANSETRON 2 MG/ML
4 INJECTION INTRAMUSCULAR; INTRAVENOUS EVERY 6 HOURS PRN
Status: DISCONTINUED | OUTPATIENT
Start: 2025-08-26 | End: 2025-08-27 | Stop reason: HOSPADM

## 2025-08-26 RX ORDER — MAGNESIUM SULFATE IN WATER 40 MG/ML
2000 INJECTION, SOLUTION INTRAVENOUS PRN
Status: DISCONTINUED | OUTPATIENT
Start: 2025-08-26 | End: 2025-08-27 | Stop reason: HOSPADM

## 2025-08-26 RX ADMIN — Medication 10 ML: at 21:56

## 2025-08-26 RX ADMIN — DULOXETINE 20 MG: 20 CAPSULE, DELAYED RELEASE ORAL at 13:14

## 2025-08-26 RX ADMIN — GABAPENTIN 200 MG: 100 CAPSULE ORAL at 08:36

## 2025-08-26 RX ADMIN — Medication 1000 UNITS: at 08:36

## 2025-08-26 RX ADMIN — METHOCARBAMOL 500 MG: 500 TABLET ORAL at 21:27

## 2025-08-26 RX ADMIN — METHOCARBAMOL 500 MG: 500 TABLET ORAL at 13:14

## 2025-08-26 RX ADMIN — DOCUSATE SODIUM 100 MG: 100 CAPSULE, LIQUID FILLED ORAL at 08:36

## 2025-08-26 RX ADMIN — METHOCARBAMOL 500 MG: 500 TABLET ORAL at 08:36

## 2025-08-26 RX ADMIN — POLYETHYLENE GLYCOL 3350 17 G: 17 POWDER, FOR SOLUTION ORAL at 08:38

## 2025-08-26 RX ADMIN — GABAPENTIN 200 MG: 100 CAPSULE ORAL at 21:27

## 2025-08-26 RX ADMIN — DOCUSATE SODIUM 100 MG: 100 CAPSULE, LIQUID FILLED ORAL at 21:27

## 2025-08-26 RX ADMIN — FLUOXETINE HYDROCHLORIDE 20 MG: 20 CAPSULE ORAL at 08:36

## 2025-08-26 RX ADMIN — OXYBUTYNIN CHLORIDE 5 MG: 5 TABLET ORAL at 13:15

## 2025-08-26 RX ADMIN — Medication 5 MG: at 21:27

## 2025-08-26 RX ADMIN — Medication 10 ML: at 08:38

## 2025-08-26 RX ADMIN — MEMANTINE 10 MG: 10 TABLET ORAL at 08:36

## 2025-08-26 RX ADMIN — MECLIZINE HYDROCHLORIDE 12.5 MG: 12.5 TABLET ORAL at 21:27

## 2025-08-26 RX ADMIN — OXYBUTYNIN CHLORIDE 5 MG: 5 TABLET ORAL at 08:36

## 2025-08-26 RX ADMIN — ENOXAPARIN SODIUM 40 MG: 100 INJECTION SUBCUTANEOUS at 08:38

## 2025-08-26 RX ADMIN — FOLIC ACID 1000 MCG: 1 TABLET ORAL at 08:36

## 2025-08-26 RX ADMIN — GABAPENTIN 200 MG: 100 CAPSULE ORAL at 13:14

## 2025-08-26 RX ADMIN — DONEPEZIL HYDROCHLORIDE 5 MG: 5 TABLET, FILM COATED ORAL at 21:26

## 2025-08-26 RX ADMIN — OXYBUTYNIN CHLORIDE 5 MG: 5 TABLET ORAL at 21:26

## 2025-08-26 RX ADMIN — POLYVINYL ALCOHOL, POVIDONE 1 DROP: 14; 6 SOLUTION/ DROPS OPHTHALMIC at 08:39

## 2025-08-26 RX ADMIN — ACETAMINOPHEN 500 MG: 500 TABLET ORAL at 08:39

## 2025-08-26 RX ADMIN — Medication 1000 UNITS: at 21:27

## 2025-08-26 RX ADMIN — POLYVINYL ALCOHOL, POVIDONE 1 DROP: 14; 6 SOLUTION/ DROPS OPHTHALMIC at 21:31

## 2025-08-26 RX ADMIN — MECLIZINE HYDROCHLORIDE 12.5 MG: 12.5 TABLET ORAL at 08:39

## 2025-08-26 RX ADMIN — FENTANYL CITRATE 25 MCG: 50 INJECTION INTRAMUSCULAR; INTRAVENOUS at 02:40

## 2025-08-26 RX ADMIN — TEMAZEPAM 15 MG: 15 CAPSULE ORAL at 21:28

## 2025-08-26 RX ADMIN — MECLIZINE HYDROCHLORIDE 12.5 MG: 12.5 TABLET ORAL at 13:14

## 2025-08-26 RX ADMIN — MEMANTINE 10 MG: 10 TABLET ORAL at 21:26

## 2025-08-26 ASSESSMENT — PAIN SCALES - GENERAL
PAINLEVEL_OUTOF10: 10
PAINLEVEL_OUTOF10: 10
PAINLEVEL_OUTOF10: 5
PAINLEVEL_OUTOF10: 10
PAINLEVEL_OUTOF10: 8
PAINLEVEL_OUTOF10: 9
PAINLEVEL_OUTOF10: 9

## 2025-08-26 ASSESSMENT — PAIN - FUNCTIONAL ASSESSMENT
PAIN_FUNCTIONAL_ASSESSMENT: 0-10
PAIN_FUNCTIONAL_ASSESSMENT: 0-10
PAIN_FUNCTIONAL_ASSESSMENT: PREVENTS OR INTERFERES WITH MANY ACTIVE NOT PASSIVE ACTIVITIES
PAIN_FUNCTIONAL_ASSESSMENT: 0-10
PAIN_FUNCTIONAL_ASSESSMENT: WONG-BAKER FACES
PAIN_FUNCTIONAL_ASSESSMENT: PREVENTS OR INTERFERES SOME ACTIVE ACTIVITIES AND ADLS
PAIN_FUNCTIONAL_ASSESSMENT: 0-10
PAIN_FUNCTIONAL_ASSESSMENT: PREVENTS OR INTERFERES WITH MANY ACTIVE NOT PASSIVE ACTIVITIES

## 2025-08-26 ASSESSMENT — PAIN DESCRIPTION - ORIENTATION
ORIENTATION: RIGHT;LEFT

## 2025-08-26 ASSESSMENT — PAIN DESCRIPTION - LOCATION
LOCATION: BACK;LEG

## 2025-08-26 ASSESSMENT — PAIN DESCRIPTION - ONSET: ONSET: PROGRESSIVE

## 2025-08-26 ASSESSMENT — PAIN DESCRIPTION - PAIN TYPE: TYPE: ACUTE PAIN;CHRONIC PAIN

## 2025-08-26 ASSESSMENT — PAIN DESCRIPTION - DESCRIPTORS
DESCRIPTORS: BURNING
DESCRIPTORS: ACHING
DESCRIPTORS: BURNING
DESCRIPTORS: ACHING;BURNING
DESCRIPTORS: BURNING;DISCOMFORT

## 2025-08-26 ASSESSMENT — PAIN SCALES - WONG BAKER: WONGBAKER_NUMERICALRESPONSE: NO HURT

## 2025-08-26 ASSESSMENT — PAIN DESCRIPTION - FREQUENCY: FREQUENCY: CONTINUOUS

## 2025-08-27 VITALS
TEMPERATURE: 97.5 F | WEIGHT: 137 LBS | SYSTOLIC BLOOD PRESSURE: 122 MMHG | RESPIRATION RATE: 16 BRPM | HEART RATE: 82 BPM | HEIGHT: 67 IN | BODY MASS INDEX: 21.5 KG/M2 | OXYGEN SATURATION: 92 % | DIASTOLIC BLOOD PRESSURE: 59 MMHG

## 2025-08-27 LAB
BASOPHILS # BLD: 0.05 K/UL (ref 0–0.2)
BASOPHILS NFR BLD: 1 % (ref 0–2)
EOSINOPHIL # BLD: 0.12 K/UL (ref 0.05–0.5)
EOSINOPHILS RELATIVE PERCENT: 2 % (ref 0–6)
ERYTHROCYTE [DISTWIDTH] IN BLOOD BY AUTOMATED COUNT: 16.9 % (ref 11.5–15)
HCT VFR BLD AUTO: 34.1 % (ref 34–48)
HGB BLD-MCNC: 10.2 G/DL (ref 11.5–15.5)
IMM GRANULOCYTES # BLD AUTO: <0.03 K/UL (ref 0–0.58)
IMM GRANULOCYTES NFR BLD: 0 % (ref 0–5)
LYMPHOCYTES NFR BLD: 1.59 K/UL (ref 1.5–4)
LYMPHOCYTES RELATIVE PERCENT: 23 % (ref 20–42)
MCH RBC QN AUTO: 25.4 PG (ref 26–35)
MCHC RBC AUTO-ENTMCNC: 29.9 G/DL (ref 32–34.5)
MCV RBC AUTO: 84.8 FL (ref 80–99.9)
MICROORGANISM SPEC CULT: NO GROWTH
MONOCYTES NFR BLD: 0.84 K/UL (ref 0.1–0.95)
MONOCYTES NFR BLD: 12 % (ref 2–12)
NEUTROPHILS NFR BLD: 63 % (ref 43–80)
NEUTS SEG NFR BLD: 4.44 K/UL (ref 1.8–7.3)
PLATELET # BLD AUTO: 274 K/UL (ref 130–450)
PMV BLD AUTO: 9.2 FL (ref 7–12)
RBC # BLD AUTO: 4.02 M/UL (ref 3.5–5.5)
SERVICE CMNT-IMP: NORMAL
SPECIMEN DESCRIPTION: NORMAL
T3FREE SERPL-MCNC: 3.71 PG/ML (ref 2–4.4)
T4 FREE SERPL-MCNC: 1.2 NG/DL (ref 0.9–1.7)
TSH SERPL DL<=0.05 MIU/L-ACNC: 0.5 UIU/ML (ref 0.27–4.2)
WBC OTHER # BLD: 7.1 K/UL (ref 4.5–11.5)

## 2025-08-27 PROCEDURE — 84439 ASSAY OF FREE THYROXINE: CPT

## 2025-08-27 PROCEDURE — 97161 PT EVAL LOW COMPLEX 20 MIN: CPT | Performed by: PHYSICAL THERAPIST

## 2025-08-27 PROCEDURE — 84443 ASSAY THYROID STIM HORMONE: CPT

## 2025-08-27 PROCEDURE — 2500000003 HC RX 250 WO HCPCS: Performed by: INTERNAL MEDICINE

## 2025-08-27 PROCEDURE — 36415 COLL VENOUS BLD VENIPUNCTURE: CPT

## 2025-08-27 PROCEDURE — 6360000002 HC RX W HCPCS: Performed by: INTERNAL MEDICINE

## 2025-08-27 PROCEDURE — 97110 THERAPEUTIC EXERCISES: CPT | Performed by: PHYSICAL THERAPIST

## 2025-08-27 PROCEDURE — 85025 COMPLETE CBC W/AUTO DIFF WBC: CPT

## 2025-08-27 PROCEDURE — 6370000000 HC RX 637 (ALT 250 FOR IP): Performed by: INTERNAL MEDICINE

## 2025-08-27 RX ORDER — HYDROCODONE BITARTRATE AND ACETAMINOPHEN 5; 325 MG/1; MG/1
1 TABLET ORAL EVERY 4 HOURS PRN
Qty: 42 TABLET | Refills: 0
Start: 2025-08-27 | End: 2025-08-28

## 2025-08-27 RX ORDER — DULOXETIN HYDROCHLORIDE 20 MG/1
20 CAPSULE, DELAYED RELEASE ORAL
Qty: 30 CAPSULE | Refills: 3 | Status: SHIPPED | OUTPATIENT
Start: 2025-08-27

## 2025-08-27 RX ORDER — DONEPEZIL HYDROCHLORIDE 5 MG/1
5 TABLET, FILM COATED ORAL NIGHTLY
Qty: 30 TABLET | Refills: 3 | Status: SHIPPED | OUTPATIENT
Start: 2025-08-27

## 2025-08-27 RX ORDER — GABAPENTIN 100 MG/1
200 CAPSULE ORAL 3 TIMES DAILY
Qty: 180 CAPSULE | Refills: 0
Start: 2025-08-27 | End: 2025-08-28

## 2025-08-27 RX ADMIN — METHOCARBAMOL 500 MG: 500 TABLET ORAL at 07:34

## 2025-08-27 RX ADMIN — MEMANTINE 10 MG: 10 TABLET ORAL at 07:34

## 2025-08-27 RX ADMIN — DOCUSATE SODIUM 100 MG: 100 CAPSULE, LIQUID FILLED ORAL at 07:33

## 2025-08-27 RX ADMIN — FLUOXETINE HYDROCHLORIDE 20 MG: 20 CAPSULE ORAL at 07:34

## 2025-08-27 RX ADMIN — FOLIC ACID 1000 MCG: 1 TABLET ORAL at 07:33

## 2025-08-27 RX ADMIN — DULOXETINE 20 MG: 20 CAPSULE, DELAYED RELEASE ORAL at 12:02

## 2025-08-27 RX ADMIN — Medication 10 ML: at 07:35

## 2025-08-27 RX ADMIN — OXYBUTYNIN CHLORIDE 5 MG: 5 TABLET ORAL at 07:34

## 2025-08-27 RX ADMIN — MECLIZINE HYDROCHLORIDE 12.5 MG: 12.5 TABLET ORAL at 07:34

## 2025-08-27 RX ADMIN — ACETAMINOPHEN 500 MG: 500 TABLET ORAL at 07:34

## 2025-08-27 RX ADMIN — Medication 1000 UNITS: at 07:33

## 2025-08-27 RX ADMIN — GABAPENTIN 200 MG: 100 CAPSULE ORAL at 07:33

## 2025-08-27 RX ADMIN — ENOXAPARIN SODIUM 40 MG: 100 INJECTION SUBCUTANEOUS at 07:33

## 2025-08-27 RX ADMIN — POLYVINYL ALCOHOL, POVIDONE 1 DROP: 14; 6 SOLUTION/ DROPS OPHTHALMIC at 07:33

## 2025-08-27 ASSESSMENT — PAIN DESCRIPTION - ORIENTATION: ORIENTATION: RIGHT;LEFT

## 2025-08-27 ASSESSMENT — PAIN DESCRIPTION - LOCATION: LOCATION: BACK;HEAD

## 2025-08-27 ASSESSMENT — PAIN SCALES - GENERAL
PAINLEVEL_OUTOF10: 6
PAINLEVEL_OUTOF10: 0
PAINLEVEL_OUTOF10: 0
PAINLEVEL_OUTOF10: 9

## 2025-08-27 ASSESSMENT — PAIN - FUNCTIONAL ASSESSMENT: PAIN_FUNCTIONAL_ASSESSMENT: 0-10

## 2025-08-27 ASSESSMENT — PAIN DESCRIPTION - DESCRIPTORS: DESCRIPTORS: BURNING;DISCOMFORT

## 2025-08-28 RX ORDER — HYDROCODONE BITARTRATE AND ACETAMINOPHEN 5; 325 MG/1; MG/1
1 TABLET ORAL EVERY 4 HOURS PRN
Qty: 42 TABLET | Refills: 0 | Status: SHIPPED | OUTPATIENT
Start: 2025-08-28 | End: 2025-08-28

## 2025-08-28 RX ORDER — HYDROCODONE BITARTRATE AND ACETAMINOPHEN 5; 325 MG/1; MG/1
1 TABLET ORAL EVERY 4 HOURS PRN
Qty: 42 TABLET | Refills: 0 | Status: SHIPPED | OUTPATIENT
Start: 2025-08-28 | End: 2025-09-04

## 2025-08-28 RX ORDER — GABAPENTIN 100 MG/1
200 CAPSULE ORAL 3 TIMES DAILY
Qty: 180 CAPSULE | Refills: 0 | Status: SHIPPED | OUTPATIENT
Start: 2025-08-28 | End: 2025-09-27